# Patient Record
Sex: FEMALE | Race: WHITE | NOT HISPANIC OR LATINO | Employment: OTHER | URBAN - METROPOLITAN AREA
[De-identification: names, ages, dates, MRNs, and addresses within clinical notes are randomized per-mention and may not be internally consistent; named-entity substitution may affect disease eponyms.]

---

## 2017-07-11 ENCOUNTER — OFFICE VISIT (OUTPATIENT)
Dept: DERMATOLOGY | Facility: CLINIC | Age: 67
End: 2017-07-11
Payer: COMMERCIAL

## 2017-07-11 VITALS — HEART RATE: 73 BPM | SYSTOLIC BLOOD PRESSURE: 147 MMHG | DIASTOLIC BLOOD PRESSURE: 79 MMHG | OXYGEN SATURATION: 94 %

## 2017-07-11 DIAGNOSIS — L81.4 LENTIGO: ICD-10-CM

## 2017-07-11 DIAGNOSIS — L82.0 INFLAMED SEBORRHEIC KERATOSIS: ICD-10-CM

## 2017-07-11 DIAGNOSIS — L82.1 SK (SEBORRHEIC KERATOSIS): Primary | ICD-10-CM

## 2017-07-11 PROCEDURE — 17110 DESTRUCTION B9 LES UP TO 14: CPT | Performed by: DERMATOLOGY

## 2017-07-11 PROCEDURE — 99203 OFFICE O/P NEW LOW 30 MIN: CPT | Mod: 25 | Performed by: DERMATOLOGY

## 2017-07-11 NOTE — MR AVS SNAPSHOT
After Visit Summary   7/11/2017    Shanelle Sepulveda    MRN: [id number]           Patient Information     Date Of Birth          1950        Visit Information        Provider Department      7/11/2017 11:00 AM Clyde Gill MD [facility]        Care Instructions    WOUND CARE INSTRUCTIONS   FOR CRYOSURGERY   This area treated with liquid nitrogen will form a blister. You do not need to bandage the area until after the blister forms and breaks (which may be a few days). When the blister breaks, begin daily dressing changes as follows:   1) Clean and dry the area with tap water using clean Q-tip or sterile gauze pad.   2) Apply Polysporin ointment or Bacitracin ointment over entire wound. Do NOT use Neosporin ointment.   3) Cover the wound with a band-aid or sterile non-stick gauze pad and micropore paper tape.   REPEAT THESE INSTRUCTIONS AT LEAST ONCE A DAY UNTIL THE WOUND HAS COMPLETELY HEALED.   It is an old wives tale that a wound heals better when it is exposed to air and allowed to dry out. The wound will heal faster with a better cosmetic result if it is kept moist with ointment and covered with a bandage.   Do not let the wound dry out.   IMPORTANT INFORMATION ON REVERSE SIDE   Supplies Needed:   *Cotton tipped applicators (Q-tips)   *Polysporin ointment or Bacitracin ointment (NOT NEOSPORIN)   *Band-aids, or non stick gauze pads and micropore paper tape   PATIENT INFORMATION   During the healing process you will notice a number of changes. All wounds develop a small halo of redness surrounding the wound. This means healing is occurring. Severe itching with extensive redness usually indicates sensitivity to the ointment or bandage tape used to dress the wound. You should call our office if this develops.   Swelling and/or discoloration around your surgical site is common, particularly when performed around the eye.   All wounds normally drain. The larger the wound  the more drainage there will be. After 7-10 days, you will notice the wound beginning to shrink and new skin will begin to grow. The wound is healed when you can see skin has formed over the entire area. A healed wound has a healthy, shiny look to the surface and is red to dark pink in color to normalize. Wounds may take approximately 4-6 weeks to heal. Larger wounds may take 6-8 weeks. After the wound is healed you may discontinue dressing changes.   You may experience a sensation of tightness as your wound heals. This is normal and will gradually subside.   Your healed wound may be sensitive to temperature changes. This sensitivity improves with time, but if you re having a lot of discomfort, try to avoid temperature extremes.   Patients frequently experience itching after their wound appears to have healed because of the continue healing under the skin. Plain Vaseline will help relieve the itching.                 Follow-ups after your visit        Your next 10 appointments already scheduled     Dec 28, 2017  9:00 AM CST   Nurse Only with [place] [facility] CMA/LPN   [facility]    [address] [address] [phone]              Who to contact     If you have questions or need follow up information about today's clinic visit or your schedule please contact [facility] directly at [phone].  Normal or non-critical lab and imaging results will be communicated to you by MyChart, letter or phone within 4 business days after the clinic has received the results. If you do not hear from us within 7 days, please contact the clinic through MyChart or phone. If you have a critical or abnormal lab result, we will notify you by phone as soon as possible.  Submit refill requests through [employer] or call your pharmacy and they will forward the refill request to us. Please allow 3 business days for your refill to be completed.          Additional  Information About Your Visit        MyChart Information     Mobile [employer] gives you secure access to your electronic health record. If you see a primary care provider, you can also send messages to your care team and make appointments. If you have questions, please call your primary care clinic.  If you do not have a primary care provider, please call [phone] and they will assist you.        Care EveryWhere ID     This is your Care EveryWhere ID. This could be used by other organizations to access your [place] medical records  [id number]        Your Vitals Were     Pulse Last Period Pulse Oximetry             73 10/24/2005 94%          Blood Pressure from Last 3 Encounters:   07/11/17 147/79   12/20/16 122/69   06/16/16 142/90    Weight from Last 3 Encounters:   12/20/16 83.9 kg (185 lb)   06/16/16 81.6 kg (180 lb)   01/08/16 84.4 kg (186 lb)              Today, you had the following     No orders found for display       Primary Care Provider Office Phone # Fax #    Ruth Jessica Angelica, APRN [facility] [phone]       [facility] [address] [address]        Equal Access to Services     NOHEMI CRAWFORD : Hadii aad ku hadasho Soomaali, waaxda luqadaha, qaybta kaalmada adeegyada, waxay mkin haycherellen agustin heath lalucila ah. So [facility] [phone].    ATENCIÓN: Si habla español, tiene a cosme disposición servicios gratuitos de asistencia lingüística. Corrine al [phone].    We comply with applicable federal civil rights laws and [place] laws. We do not discriminate on the basis of race, color, national origin, age, disability sex, sexual orientation or gender identity.            Thank you!     Thank you for choosing [facility]  for your care. Our goal is always to provide you with excellent care. Hearing back from our patients is one way we can continue to improve our services. Please take a few minutes to complete the written survey that you may receive in the mail after your  visit with us. Thank you!             Your Updated Medication List - Protect others around you: Learn how to safely use, store and throw away your medicines at www.disposemymeds.org.          This list is accurate as of: 7/11/17 11:55 AM.  Always use your most recent med list.                   Brand Name Dispense Instructions for use Diagnosis    fish oil-omega-3 fatty acids 1000 MG capsule     120 capsule    Take 1 capsule (1 g) by mouth 3 times daily        metoprolol 100 MG 24 hr tablet    TOPROL-XL    90 tablet    Take 1 tablet (100 mg) by mouth daily    Essential hypertension, benign       MULTIVITAL PO      Take  by mouth.        pravastatin 20 MG tablet    PRAVACHOL    90 tablet    Take 1 tablet (20 mg) by mouth daily    Hyperlipidemia LDL goal <130       venlafaxine 150 MG 24 hr capsule    EFFEXOR-XR    90 capsule    Take 1 capsule (150 mg) by mouth daily    Major depression in complete remission (H)

## 2017-07-11 NOTE — NURSING NOTE
"Chief Complaint   Patient presents with     Derm Problem     check face       Initial /79  Pulse 73  LMP 10/24/2005  SpO2 94% Estimated body mass index is 31.26 kg/(m^2) as calculated from the following:    Height as of 12/20/16: 1.638 m (5' 4.5\").    Weight as of 12/20/16: 83.9 kg (185 lb).  BP completed using cuff size: talia Ac LPN    "

## 2017-07-11 NOTE — PROGRESS NOTES
Shanelle Sepulveda is a 66 year old year old female patient here today for spot on right brow.   .  Patient states this has been present for years.  Patient reports the following symptoms:  Crusting .  Patient reports the following previous treatments none.  Patient reports the following modifying factors none.  Associated symptoms: none.  Patient has no other skin complaints today.  Remainder of the HPI, Meds, PMH, Allergies, FH, and SH was reviewed in chart.      Past Medical History:   Diagnosis Date     Intramural leiomyoma of uterus      MENOPAUSAL DISORDER NOS 8/10/2005    Menses getting much heavier, pelvic sono 02/06--2 uterine fibroids seen, 1.5 and 2.7cm each; simple left ovarian cyst 4.2 cm, 2.6 cm complex right ovarian cyst--endometrial biopsy benign     Symptomatic menopausal or female climacteric states        Past Surgical History:   Procedure Laterality Date     COLONOSCOPY  02/17/2004    wnl     COLONOSCOPY N/A 6/16/2016    Procedure: COLONOSCOPY;  Surgeon: Lloyd Gutierrez MD;  Location: [facility]     HYSTERECTOMY, VAGINAL  4/18/06    Laparoscopic supracerv hyst-BSO        Family History   Problem Relation Age of Onset     Hypertension Mother      CANCER Mother      pancreatic CA     Lipids Mother      CANCER Father      liver CA     Depression Father      Depression Sister      Arthritis Sister      left knee replacement     Breast Cancer Sister      Breast Cancer Sister        Social History     Social History     Marital status:      Spouse name: N/A     Number of children: N/A     Years of education: N/A     Occupational History     Not on file.     Social History Main Topics     Smoking status: Never Smoker     Smokeless tobacco: Never Used     Alcohol use Yes      Comment: rare     Drug use: No     Sexual activity: Yes     Partners: Male     Birth control/ protection: Surgical      Comment: hyster.     Other Topics Concern     Parent/Sibling W/ Cabg, Mi Or Angioplasty Before 65f 55m? No      Social History Narrative       Outpatient Encounter Prescriptions as of 7/11/2017   Medication Sig Dispense Refill     metoprolol (TOPROL-XL) 100 MG 24 hr tablet Take 1 tablet (100 mg) by mouth daily 90 tablet 3     venlafaxine (EFFEXOR-XR) 150 MG 24 hr capsule Take 1 capsule (150 mg) by mouth daily 90 capsule 3     pravastatin (PRAVACHOL) 20 MG tablet Take 1 tablet (20 mg) by mouth daily 90 tablet 2     fish oil-omega-3 fatty acids (FISH OIL) 1000 MG capsule Take 1 capsule (1 g) by mouth 3 times daily 120 capsule 11     Multiple Vitamins-Minerals (MULTIVITAL PO) Take  by mouth.       No facility-administered encounter medications on file as of 7/11/2017.              Review Of Systems  Skin: As above  Eyes: negative  Ears/Nose/Throat: negative  Respiratory: No shortness of breath, dyspnea on exertion, cough, or hemoptysis  Cardiovascular: negative  Gastrointestinal: negative  Genitourinary: negative  Musculoskeletal: negative  Neurologic: negative  Psychiatric: negative  Hematologic/Lymphatic/Immunologic: negative  Endocrine: negative      O:   NAD, WDWN, Alert & Oriented, Mood & Affect wnl, Vitals stable   Here today alone   /79  Pulse 73  LMP 10/24/2005  SpO2 94%   General appearance normal   Vitals stable   Alert, oriented and in no acute distress      Following lymph nodes palpated: Occipital, Cervical, Supraclavicular no lad   Stuck on papules and brown macules on trunk and ext    r brow inflamed seborrheic keratosis         The remainder of expanded problem focused exam was unremarkable; the following areas were examined:  scalp/hair, conjunctiva/lids, face, neck, lips, chest, digits/nails, RUE, LUE.      Eyes: Conjunctivae/lids:Normal     ENT: Lips, buccal mucosa, tongue: normal    MSK:Normal    Cardiovascular: peripheral edema none    Pulm: Breathing Normal    Lymph Nodes: No Head and Neck Lymphadenopathy     Neuro/Psych: Orientation:Normal; Mood/Affect:Normal      A/P:  1. Seborrheic  keratosis, lentigo  2. R brow inflamed seborrheic keratosis   LN2:  Treated with LN2 for 5s for 1-2 cycles. Warned risks of blistering, pain, pigment change, scarring, and incomplete resolution.  Advised patient to return if lesions do not completely resolve.  Wound care sheet given.  BENIGN LESIONS DISCUSSED WITH PATIENT:  I discussed the specifics of tumor, prognosis, and genetics of benign lesions.  I explained that treatment of these lesions would be purely cosmetic and not medically neccessary.  I discussed with patient different removal options including excision, cautery and /or laser.      Nature and genetics of benign skin lesions dicussed with patient.  Signs and Symptoms of skin cancer discussed with patient.  ABCDEs of melanoma reviewed with patient.  Patient encouraged to perform monthly skin exams.  UV precautions reviewed with patient.  Skin care regimen reviewed with patient: Eliminate harsh soaps, i.e. Dial, zest, irsih spring; Mild soaps such as Cetaphil or Dove sensitive skin, avoid hot or cold showers, aggressive use of emollients including vanicream, cetaphil or cerave discussed with patient.    Risks of non-melanoma skin cancer discussed with patient   Return to clinic 12 months

## 2017-07-18 ENCOUNTER — MYC MEDICAL ADVICE (OUTPATIENT)
Dept: FAMILY MEDICINE | Facility: CLINIC | Age: 67
End: 2017-07-18

## 2017-07-18 DIAGNOSIS — E78.5 HYPERLIPIDEMIA LDL GOAL <130: ICD-10-CM

## 2017-07-18 RX ORDER — PRAVASTATIN SODIUM 20 MG
20 TABLET ORAL DAILY
Qty: 90 TABLET | Status: CANCELLED | OUTPATIENT
Start: 2017-07-18

## 2017-07-18 NOTE — TELEPHONE ENCOUNTER
Pravastatin     Last Written Prescription Date: 9/8/16  Last Fill Quantity: 90, # refills: 2  Last Office Visit with [facility], [facility] or [facility] prescribing provider: 12/20/16       Lab Results   Component Value Date    CHOL 205 06/08/2016     Lab Results   Component Value Date    HDL 54 06/08/2016     Lab Results   Component Value Date     06/08/2016     Lab Results   Component Value Date    TRIG 164 06/08/2016     Lab Results   Component Value Date    CHOLHDLRATIO 2.2 04/06/2015     Advised she is due for labs, or we can do 30 at a local pharm only.   Will postpone to be sure she comes in for labs   Nette Davis RNC

## 2017-07-25 NOTE — TELEPHONE ENCOUNTER
Patient has scheduled 8/1/17 lab only visit.   Sent her another message to see if she will need pills prior to this appt.   Nette LEON

## 2017-07-27 RX ORDER — PRAVASTATIN SODIUM 20 MG
20 TABLET ORAL DAILY
Qty: 90 TABLET | Refills: 3 | Status: SHIPPED | OUTPATIENT
Start: 2017-07-27 | End: 2018-01-02

## 2017-08-01 DIAGNOSIS — I10 ESSENTIAL HYPERTENSION, BENIGN: ICD-10-CM

## 2017-08-01 DIAGNOSIS — E78.5 HYPERLIPIDEMIA LDL GOAL <130: ICD-10-CM

## 2017-08-01 LAB
ANION GAP SERPL CALCULATED.3IONS-SCNC: 4 MMOL/L (ref 3–14)
BUN SERPL-MCNC: 26 MG/DL (ref 7–30)
CALCIUM SERPL-MCNC: 9.5 MG/DL (ref 8.5–10.1)
CHLORIDE SERPL-SCNC: 106 MMOL/L (ref 94–109)
CHOLEST SERPL-MCNC: 208 MG/DL
CO2 SERPL-SCNC: 32 MMOL/L (ref 20–32)
CREAT SERPL-MCNC: 0.85 MG/DL (ref 0.52–1.04)
GFR SERPL CREATININE-BSD FRML MDRD: 67 ML/MIN/1.7M2
GLUCOSE SERPL-MCNC: 103 MG/DL (ref 70–99)
HDLC SERPL-MCNC: 65 MG/DL
LDLC SERPL CALC-MCNC: 115 MG/DL
NONHDLC SERPL-MCNC: 143 MG/DL
POTASSIUM SERPL-SCNC: 4.4 MMOL/L (ref 3.4–5.3)
SODIUM SERPL-SCNC: 142 MMOL/L (ref 133–144)
TRIGL SERPL-MCNC: 139 MG/DL

## 2017-08-01 PROCEDURE — 80048 BASIC METABOLIC PNL TOTAL CA: CPT | Performed by: NURSE PRACTITIONER

## 2017-08-01 PROCEDURE — 80061 LIPID PANEL: CPT | Performed by: NURSE PRACTITIONER

## 2017-08-01 PROCEDURE — 36415 COLL VENOUS BLD VENIPUNCTURE: CPT | Performed by: NURSE PRACTITIONER

## 2017-09-11 ENCOUNTER — ALLIED HEALTH/NURSE VISIT (OUTPATIENT)
Dept: FAMILY MEDICINE | Facility: CLINIC | Age: 67
End: 2017-09-11
Payer: COMMERCIAL

## 2017-09-11 DIAGNOSIS — Z23 NEED FOR PROPHYLACTIC VACCINATION AND INOCULATION AGAINST INFLUENZA: Primary | ICD-10-CM

## 2017-09-11 PROCEDURE — 90662 IIV NO PRSV INCREASED AG IM: CPT

## 2017-09-11 PROCEDURE — 90471 IMMUNIZATION ADMIN: CPT

## 2017-09-11 PROCEDURE — 99207 ZZC NO CHARGE NURSE ONLY: CPT

## 2017-09-11 NOTE — PROGRESS NOTES
Injectable Influenza Immunization Documentation    1.  Are you sick today? (Fever of 100.5 or higher on the day of the clinic)   No    2.  Have you ever had Guillain-Concepcion Syndrome within 6 weeks of an influenza vaccionation?  No    3. Do you have a life-threatening allergy to eggs?  No    4. Do you have a life-threatening allergy to a component of the vaccine? May include antibiotics, gelatin or latex.  No     5. Have you ever had a reaction to a dose of flu vaccine that needed immediate medical attention?  No   Answered by patient   Form completed by Mckenzie NI CMA ([facility])

## 2017-09-11 NOTE — MR AVS SNAPSHOT
After Visit Summary   9/11/2017    Shanelle Sepulveda    MRN: [id number]           Patient Information     Date Of Birth          1950        Visit Information        Provider Department      9/11/2017 9:35 AM [facility]        Today's Diagnoses     Need for prophylactic vaccination and inoculation against influenza    -  1       Follow-ups after your visit        Your next 10 appointments already scheduled     Dec 28, 2017  9:00 AM CST   Nurse Only with [facility]IM CMA/LPN   [facility]    [address] [address] [phone]              Who to contact     If you have questions or need follow up information about today's clinic visit or your schedule please contact [facility] directly at [phone].  Normal or non-critical lab and imaging results will be communicated to you by Packbackhart, letter or phone within 4 business days after the clinic has received the results. If you do not hear from us within 7 days, please contact the clinic through Packbackhart or phone. If you have a critical or abnormal lab result, we will notify you by phone as soon as possible.  Submit refill requests through [employer] or call your pharmacy and they will forward the refill request to us. Please allow 3 business days for your refill to be completed.          Additional Information About Your Visit        MyChart Information     [employer] gives you secure access to your electronic health record. If you see a primary care provider, you can also send messages to your care team and make appointments. If you have questions, please call your primary care clinic.  If you do not have a primary care provider, please call [phone] and they will assist you.        Care EveryWhere ID     This is your Care EveryWhere ID. This could be used by other organizations to access your [place] medical records  [id number]         Your Vitals Were     Last Period                   10/24/2005            Blood Pressure from Last 3 Encounters:   07/11/17 147/79   12/20/16 122/69   06/16/16 142/90    Weight from Last 3 Encounters:   12/20/16 185 lb (83.9 kg)   06/16/16 180 lb (81.6 kg)   01/08/16 186 lb (84.4 kg)              We Performed the Following     FLU VACCINE, INCREASED ANTIGEN, PRESV FREE, AGE 65+ [83282]     Vaccine Administration, Initial [53890]        Primary Care Provider Office Phone # Fax #    RICK Swartz [phone] [address] [address]        Equal Access to Services     NOHEMI CRAWFORD : Hadii fahad pazo Soleanna, waaxda luqadaha, qaybta kaalmada adeegyada, jaime alfonso . So [facility] [phone].    ATENCIÓN: Si habla español, tiene a cosme disposición servicios gratuitos de asistencia lingüística. Llame al [phone].    We comply with applicable federal civil rights laws and [place] laws. We do not discriminate on the basis of race, color, national origin, age, disability sex, sexual orientation or gender identity.            Thank you!     Thank you for choosing [facility]  for your care. Our goal is always to provide you with excellent care. Hearing back from our patients is one way we can continue to improve our services. Please take a few minutes to complete the written survey that you may receive in the mail after your visit with us. Thank you!             Your Updated Medication List - Protect others around you: Learn how to safely use, store and throw away your medicines at www.disposemymeds.org.          This list is accurate as of: 9/11/17  9:40 AM.  Always use your most recent med list.                   Brand Name Dispense Instructions for use Diagnosis    fish oil-omega-3 fatty acids 1000 MG capsule     120 capsule    Take 1 capsule (1 g) by mouth 3 times daily        metoprolol 100 MG 24 hr tablet    TOPROL-XL    90  tablet    Take 1 tablet (100 mg) by mouth daily    Essential hypertension, benign       MULTIVITAL PO      Take  by mouth.        pravastatin 20 MG tablet    PRAVACHOL    90 tablet    Take 1 tablet (20 mg) by mouth daily    Hyperlipidemia LDL goal <130       venlafaxine 150 MG 24 hr capsule    EFFEXOR-XR    90 capsule    Take 1 capsule (150 mg) by mouth daily    Major depression in complete remission (H)

## 2017-11-13 ENCOUNTER — MYC MEDICAL ADVICE (OUTPATIENT)
Dept: FAMILY MEDICINE | Facility: CLINIC | Age: 67
End: 2017-11-13

## 2017-11-13 DIAGNOSIS — F32.5 MAJOR DEPRESSION IN COMPLETE REMISSION (H): ICD-10-CM

## 2017-11-14 RX ORDER — VENLAFAXINE HYDROCHLORIDE 150 MG/1
CAPSULE, EXTENDED RELEASE ORAL
Qty: 90 CAPSULE | Refills: 0 | Status: SHIPPED | OUTPATIENT
Start: 2017-11-14 | End: 2018-01-02

## 2017-11-14 NOTE — TELEPHONE ENCOUNTER
venlafaxine (EFFEXOR-XR) 150 MG 24 hr capsule [Pharmacy Med Name: VENLAFAXINE HCL  MG Capsule Extended Release 24 Hour]   11/13/2017  8:53 PM        Blood pressure under 140/90        PHQ-9 score of less than 5 in past 6 months        Recent (6 mo) or future visit with authorizing provider's specialty        Patient is age 18 or older        No active pregnancy on record        Normal serum creatinine on file in past 12 months        No positive pregnancy test in past 12 months   Venlafaxine/effexor    Last Written Prescription Date: 12/20/16  Last Fill Quantity: 90, # refills: 3  Last Office Visit with [facility] or [facility] prescribing provider: 12/20/16   Next 5 appointments (look out 90 days)     Dec 20, 2017 10:30 AM CST   Screening Mammogram with [address] [facility] ([facility])    [address] [address]   [phone]            Jan 02, 2018  9:00 AM CST   PHYSICAL with Cristopher Valladares MD   [facility]    [address] [address]   [phone]                   BP Readings from Last 3 Encounters:   07/11/17 147/79   12/20/16 122/69   06/16/16 142/90     Pulse: (for Fetzima)  Creatinine   Date Value Ref Range Status   08/01/2017 0.85 0.52 - 1.04 mg/dL Final   ]    Last PHQ-9 score on record=   PHQ-9 SCORE 12/20/2016   Total Score -   Total Score 2     Routing refill request to provider for review/approval because:  BP above goal, and PHQ9 not current. (I will send her one to complete on VoÃ¶lks now)   She has an appt in January.   RN protocol would allow for one month supply, and in [place] note,  Dr Valladares -- I will be running out of my Venlafaxine 150 before our January 2 appointment.  Please OK a 90-day refill through [facility] mail order.  Sincerely,  Mckenzie Sepulveda   patient is requesting 90 days as it is Mail order, [facility].   Nette Davis RNC

## 2017-12-20 ENCOUNTER — HOSPITAL ENCOUNTER (OUTPATIENT)
Dept: MAMMOGRAPHY | Facility: CLINIC | Age: 67
Discharge: HOME | End: 2017-12-20
Attending: NURSE PRACTITIONER | Admitting: NURSE PRACTITIONER
Payer: MEDICARE

## 2017-12-20 DIAGNOSIS — Z12.31 VISIT FOR SCREENING MAMMOGRAM: ICD-10-CM

## 2017-12-20 PROCEDURE — G0202 SCR MAMMO BI INCL CAD: HCPCS

## 2017-12-20 PROCEDURE — 77063 BREAST TOMOSYNTHESIS BI: CPT

## 2017-12-30 ASSESSMENT — ACTIVITIES OF DAILY LIVING (ADL)
CURRENT_FUNCTION: NO ASSISTANCE NEEDED
I_NEED_ASSISTANCE_FOR_THE_FOLLOWING_DAILY_ACTIVITIES:: NO ASSISTANCE IS NEEDED

## 2018-01-02 ENCOUNTER — OFFICE VISIT (OUTPATIENT)
Dept: FAMILY MEDICINE | Facility: CLINIC | Age: 68
End: 2018-01-02
Payer: COMMERCIAL

## 2018-01-02 VITALS
BODY MASS INDEX: 31.39 KG/M2 | DIASTOLIC BLOOD PRESSURE: 103 MMHG | TEMPERATURE: 98.7 F | SYSTOLIC BLOOD PRESSURE: 171 MMHG | HEIGHT: 65 IN | WEIGHT: 188.4 LBS | HEART RATE: 73 BPM

## 2018-01-02 DIAGNOSIS — R21 GROIN RASH: ICD-10-CM

## 2018-01-02 DIAGNOSIS — Z12.4 SCREENING FOR MALIGNANT NEOPLASM OF CERVIX: ICD-10-CM

## 2018-01-02 DIAGNOSIS — I10 ESSENTIAL HYPERTENSION, BENIGN: ICD-10-CM

## 2018-01-02 DIAGNOSIS — Z11.59 NEED FOR HEPATITIS C SCREENING TEST: ICD-10-CM

## 2018-01-02 DIAGNOSIS — Z23 NEED FOR VACCINATION: ICD-10-CM

## 2018-01-02 DIAGNOSIS — Z78.0 POST-MENOPAUSE: ICD-10-CM

## 2018-01-02 DIAGNOSIS — F32.5 MAJOR DEPRESSION IN COMPLETE REMISSION (H): ICD-10-CM

## 2018-01-02 DIAGNOSIS — E78.5 HYPERLIPIDEMIA LDL GOAL <130: ICD-10-CM

## 2018-01-02 DIAGNOSIS — R73.01 ELEVATED FASTING GLUCOSE: ICD-10-CM

## 2018-01-02 DIAGNOSIS — Z00.00 ROUTINE HEALTH MAINTENANCE: Primary | ICD-10-CM

## 2018-01-02 LAB
HBA1C MFR BLD: 5.8 % (ref 4.3–6)
HCV AB SERPL QL IA: NONREACTIVE

## 2018-01-02 PROCEDURE — 99397 PER PM REEVAL EST PAT 65+ YR: CPT | Mod: 25 | Performed by: INTERNAL MEDICINE

## 2018-01-02 PROCEDURE — G0008 ADMIN INFLUENZA VIRUS VAC: HCPCS | Performed by: INTERNAL MEDICINE

## 2018-01-02 PROCEDURE — 83036 HEMOGLOBIN GLYCOSYLATED A1C: CPT | Performed by: INTERNAL MEDICINE

## 2018-01-02 PROCEDURE — G0476 HPV COMBO ASSAY CA SCREEN: HCPCS | Performed by: INTERNAL MEDICINE

## 2018-01-02 PROCEDURE — G0009 ADMIN PNEUMOCOCCAL VACCINE: HCPCS | Performed by: INTERNAL MEDICINE

## 2018-01-02 PROCEDURE — 36415 COLL VENOUS BLD VENIPUNCTURE: CPT | Performed by: INTERNAL MEDICINE

## 2018-01-02 PROCEDURE — 86803 HEPATITIS C AB TEST: CPT | Performed by: INTERNAL MEDICINE

## 2018-01-02 PROCEDURE — G0145 SCR C/V CYTO,THINLAYER,RESCR: HCPCS | Performed by: INTERNAL MEDICINE

## 2018-01-02 PROCEDURE — 90715 TDAP VACCINE 7 YRS/> IM: CPT | Performed by: INTERNAL MEDICINE

## 2018-01-02 PROCEDURE — 90670 PCV13 VACCINE IM: CPT | Performed by: INTERNAL MEDICINE

## 2018-01-02 PROCEDURE — 99213 OFFICE O/P EST LOW 20 MIN: CPT | Mod: 25 | Performed by: INTERNAL MEDICINE

## 2018-01-02 RX ORDER — PRAVASTATIN SODIUM 20 MG
20 TABLET ORAL DAILY
Qty: 90 TABLET | Refills: 3 | Status: SHIPPED | OUTPATIENT
Start: 2018-01-02 | End: 2018-06-28

## 2018-01-02 RX ORDER — VENLAFAXINE HYDROCHLORIDE 150 MG/1
CAPSULE, EXTENDED RELEASE ORAL
Qty: 90 CAPSULE | Refills: 3 | Status: SHIPPED | OUTPATIENT
Start: 2018-01-02 | End: 2018-04-02

## 2018-01-02 RX ORDER — NYSTATIN AND TRIAMCINOLONE ACETONIDE 100000; 1 [USP'U]/G; MG/G
CREAM TOPICAL 2 TIMES DAILY
Qty: 30 G | Refills: 1 | Status: SHIPPED | OUTPATIENT
Start: 2018-01-02 | End: 2018-01-12

## 2018-01-02 RX ORDER — METOPROLOL SUCCINATE 100 MG/1
100 TABLET, EXTENDED RELEASE ORAL DAILY
Qty: 90 TABLET | Refills: 3 | Status: SHIPPED | OUTPATIENT
Start: 2018-01-02 | End: 2018-04-02

## 2018-01-02 ASSESSMENT — ANXIETY QUESTIONNAIRES
2. NOT BEING ABLE TO STOP OR CONTROL WORRYING: NOT AT ALL
GAD7 TOTAL SCORE: 0
5. BEING SO RESTLESS THAT IT IS HARD TO SIT STILL: NOT AT ALL
7. FEELING AFRAID AS IF SOMETHING AWFUL MIGHT HAPPEN: NOT AT ALL
3. WORRYING TOO MUCH ABOUT DIFFERENT THINGS: NOT AT ALL
1. FEELING NERVOUS, ANXIOUS, OR ON EDGE: NOT AT ALL
6. BECOMING EASILY ANNOYED OR IRRITABLE: NOT AT ALL

## 2018-01-02 ASSESSMENT — ACTIVITIES OF DAILY LIVING (ADL): CURRENT_FUNCTION: NO ASSISTANCE NEEDED

## 2018-01-02 ASSESSMENT — PATIENT HEALTH QUESTIONNAIRE - PHQ9
SUM OF ALL RESPONSES TO PHQ QUESTIONS 1-9: 1
5. POOR APPETITE OR OVEREATING: NOT AT ALL

## 2018-01-02 NOTE — NURSING NOTE
"Chief Complaint   Patient presents with     Medicare Visit       Initial BP (!) 171/103 (BP Location: Right arm, Patient Position: Chair, Cuff Size: Adult Regular)  Pulse 73  Temp 98.7  F (37.1  C) (Tympanic)  Ht 5' 4.5\" (1.638 m)  Wt 188 lb 6.4 oz (85.5 kg)  LMP 10/24/2005  BMI 31.84 kg/m2 Estimated body mass index is 31.84 kg/(m^2) as calculated from the following:    Height as of this encounter: 5' 4.5\" (1.638 m).    Weight as of this encounter: 188 lb 6.4 oz (85.5 kg).  Medication Reconciliation: complete     Marium Duarte, PATRIZIA      "

## 2018-01-02 NOTE — PROGRESS NOTES
SUBJECTIVE:   CC: Shanelle Sepulveda is an 67 year old woman who presents for preventive health visit.     Physical   Annual:     Getting at least 3 servings of Calcium per day::  Yes    Bi-annual eye exam::  Yes    Dental care twice a year::  Yes    Sleep apnea or symptoms of sleep apnea::  Daytime drowsiness    Diet::  Regular (no restrictions)    Frequency of exercise::  1 day/week    Duration of exercise::  Less than 15 minutes    Taking medications regularly::  Yes    Medication side effects::  No muscle aches and No significant flushing    Additional concerns today::  YES    Ability to successfully perform activities of daily living: no assistance needed  Home Safety:  Lack of grab bars in the bathroom  Hearing Impairment: no hearing concerns    {Outside tests to abstract? :934824}    {additional problems to add (Optional):438745}    Today's PHQ-2 Score:   PHQ-2 ( 1999 [employer]) 12/30/2017   Q1: Little interest or pleasure in doing things 0   Q2: Feeling down, depressed or hopeless 0   PHQ-2 Score 0   Q1: Little interest or pleasure in doing things Not at all   Q2: Feeling down, depressed or hopeless Not at all   PHQ-2 Score 0       Abuse: Current or Past(Physical, Sexual or Emotional)- {YES/NO/NA:909006}  Do you feel safe in your environment - {YES/NO/NA:660390}    Social History   Substance Use Topics     Smoking status: Never Smoker     Smokeless tobacco: Never Used     Alcohol use Yes      Comment: rare     Alcohol Use 12/30/2017   If you drink alcohol, do you typically have greater than 3 drinks per day OR greater than 7 drinks per week?   No   {add AUDIT responses (Optional) (A score of 7 for adult men is an indication of hazardous drinking; a score of 8 or more is an indication of an alcohol use disorder.  A score of 7 or more for adult women is an indication of hazardous drinking or an alchohol use disorder):437023}    Reviewed orders with patient.  Reviewed health maintenance and updated orders  "accordingly - {Yes/No:740812::\"Yes\"}  {Chronicprobdata (Optional):033275}    {Mammo Decision Support (Optional):131082}    Pertinent mammograms are reviewed under the imaging tab.  History of abnormal Pap smear: {PAP HX:482924}    Reviewed and updated as needed this visit by clinical staff         Reviewed and updated as needed this visit by Provider        {HISTORY OPTIONS (Optional):692934}    Review of Systems  {FEMALE PREVENTATIVE ROS:411320}     OBJECTIVE:   LMP 10/24/2005  Physical Exam  {Exam Choices:348126}    ASSESSMENT/PLAN:   {Diag Picklist:642944}    COUNSELING:  {FEMALE COUNSELING MESSAGES:430489::\"Reviewed preventive health counseling, as reflected in patient instructions\"}    {BP Counseling- Complete if BP >= 120/80  (Optional):482260}     reports that she has never smoked. She has never used smokeless tobacco.  {Tobacco Cessation -- Complete if patient is a smoker (Optional):569973}  Estimated body mass index is 31.26 kg/(m^2) as calculated from the following:    Height as of 12/20/16: 5' 4.5\" (1.638 m).    Weight as of 12/20/16: 185 lb (83.9 kg).   {Weight Management Plan (ACO) Complete if BMI is abnormal-  Ages 18-64  BMI >24.9.  Age 65+ with BMI <23 or >30 (Optional):119585}    Counseling Resources:  ATP IV Guidelines  Pooled Cohorts Equation Calculator  Breast Cancer Risk Calculator  FRAX Risk Assessment  ICSI Preventive Guidelines  Dietary Guidelines for Americans, 2010  USDA's MyPlate  ASA Prophylaxis  Lung CA Screening    Cristopher Valladares MD  [facility]  Answers for HPI/ROS submitted by the patient on 12/30/2017   PHQ-2 Score: 0    "

## 2018-01-02 NOTE — PATIENT INSTRUCTIONS
Check your blood pressures at home a few times per week and bring these numbers in for a free nurse blood pressure check in 2 weeks.      If the dizzy episodes worsened, please make another appointment for further evaluation.       Preventive Health Recommendations    Female Ages 65 +    Yearly exam:     See your health care provider every year in order to  o Review health changes.   o Discuss preventive care.    o Review your medicines if your doctor has prescribed any.      You no longer need a yearly Pap test unless you've had an abnormal Pap test in the past 10 years. If you have vaginal symptoms, such as bleeding or discharge, be sure to talk with your provider about a Pap test.      Every 1 to 2 years, have a mammogram.  If you are over 69, talk with your health care provider about whether or not you want to continue having screening mammograms.      Every 10 years, have a colonoscopy. Or, have a yearly FIT test (stool test). These exams will check for colon cancer.       Have a cholesterol test every 5 years, or more often if your doctor advises it.       Have a diabetes test (fasting glucose) every three years. If you are at risk for diabetes, you should have this test more often.       At age 65, have a bone density scan (DEXA) to check for osteoporosis (brittle bone disease).    Shots:    Get a flu shot each year.    Get a tetanus shot every 10 years.    Talk to your doctor about your pneumonia vaccines. There are now two you should receive - Pneumovax (PPSV 23) and Prevnar (PCV 13).    Talk to your doctor about the shingles vaccine.    Talk to your doctor about the hepatitis B vaccine.    Nutrition:     Eat at least 5 servings of fruits and vegetables each day.      Eat whole-grain bread, whole-wheat pasta and brown rice instead of white grains and rice.      Talk to your provider about Calcium and Vitamin D.     Lifestyle    Exercise at least 150 minutes a week (30 minutes a day, 5 days a week). This  will help you control your weight and prevent disease.      Limit alcohol to one drink per day.      No smoking.       Wear sunscreen to prevent skin cancer.       See your dentist twice a year for an exam and cleaning.      See your eye doctor every 1 to 2 years to screen for conditions such as glaucoma, macular degeneration and cataracts.

## 2018-01-02 NOTE — MR AVS SNAPSHOT
After Visit Summary   1/2/2018    Shanelle Sepulveda    MRN: [id number]           Patient Information     Date Of Birth          1950        Visit Information        Provider Department      1/2/2018 9:00 AM Cristopher Valladares MD [facility]        Today's Diagnoses     Need for hepatitis C screening test    -  1    Elevated fasting glucose        Post-menopause        Screening for malignant neoplasm of cervix        Groin rash          Care Instructions    Check your blood pressures at home a few times per week and bring these numbers in for a free nurse blood pressure check in 2 weeks.      If the dizzy episodes worsened, please make another appointment for further evaluation.       Preventive Health Recommendations    Female Ages 65 +    Yearly exam:     See your health care provider every year in order to  o Review health changes.   o Discuss preventive care.    o Review your medicines if your doctor has prescribed any.      You no longer need a yearly Pap test unless you've had an abnormal Pap test in the past 10 years. If you have vaginal symptoms, such as bleeding or discharge, be sure to talk with your provider about a Pap test.      Every 1 to 2 years, have a mammogram.  If you are over 69, talk with your health care provider about whether or not you want to continue having screening mammograms.      Every 10 years, have a colonoscopy. Or, have a yearly FIT test (stool test). These exams will check for colon cancer.       Have a cholesterol test every 5 years, or more often if your doctor advises it.       Have a diabetes test (fasting glucose) every three years. If you are at risk for diabetes, you should have this test more often.       At age 65, have a bone density scan (DEXA) to check for osteoporosis (brittle bone disease).    Shots:    Get a flu shot each year.    Get a tetanus shot every 10 years.    Talk to your doctor about your pneumonia vaccines. There are now two you  should receive - Pneumovax (PPSV 23) and Prevnar (PCV 13).    Talk to your doctor about the shingles vaccine.    Talk to your doctor about the hepatitis B vaccine.    Nutrition:     Eat at least 5 servings of fruits and vegetables each day.      Eat whole-grain bread, whole-wheat pasta and brown rice instead of white grains and rice.      Talk to your provider about Calcium and Vitamin D.     Lifestyle    Exercise at least 150 minutes a week (30 minutes a day, 5 days a week). This will help you control your weight and prevent disease.      Limit alcohol to one drink per day.      No smoking.       Wear sunscreen to prevent skin cancer.       See your dentist twice a year for an exam and cleaning.      See your eye doctor every 1 to 2 years to screen for conditions such as glaucoma, macular degeneration and cataracts.          Follow-ups after your visit        Future tests that were ordered for you today     Open Future Orders        Priority Expected Expires Ordered    DX Hip/Pelvis/Spine Routine  1/2/2019 1/2/2018            Who to contact     If you have questions or need follow up information about today's clinic visit or your schedule please contact [facility] directly at [phone].  Normal or non-critical lab and imaging results will be communicated to you by [place], letter or phone within 4 business days after the clinic has received the results. If you do not hear from us within 7 days, please contact the clinic through [place] or phone. If you have a critical or abnormal lab result, we will notify you by phone as soon as possible.  Submit refill requests through Callvine or call your pharmacy and they will forward the refill request to us. Please allow 3 business days for your refill to be completed.          Additional Information About Your Visit        [email or web address] Information     Callvine gives you secure access to your electronic health record. If you see a primary care provider, you can  "also send messages to your care team and make appointments. If you have questions, please call your primary care clinic.  If you do not have a primary care provider, please call [phone] and they will assist you.        Care EveryWhere ID     This is your Care EveryWhere ID. This could be used by other organizations to access your [place] medical records  [id number]        Your Vitals Were     Pulse Temperature Height Last Period BMI (Body Mass Index)       73 98.7  F (37.1  C) (Tympanic) 5' 4.5\" (1.638 m) 10/24/2005 31.84 kg/m2        Blood Pressure from Last 3 Encounters:   01/02/18 (!) 171/103   07/11/17 147/79   12/20/16 122/69    Weight from Last 3 Encounters:   01/02/18 188 lb 6.4 oz (85.5 kg)   12/20/16 185 lb (83.9 kg)   06/16/16 180 lb (81.6 kg)              We Performed the Following     Hemoglobin A1c     HPV High Risk Types DNA Cervical     Pap imaged thin layer screen with HPV - recommended age 30 - 65 years (select HPV order below)          Today's Medication Changes          These changes are accurate as of: 1/2/18 10:08 AM.  If you have any questions, ask your nurse or doctor.               Start taking these medicines.        Dose/Directions    nystatin-triamcinolone cream   Commonly known as:  MYCOLOG II   Used for:  Groin rash   Started by:  Cristopher Valladares MD        Apply topically 2 times daily for 14 days Can resume after a week long break if needed.   Quantity:  30 g   Refills:  1            Where to get your medicines      These medications were sent to [facility] - [place], [place] - [address]   [address] [facility] [address]     Phone:  [phone]     nystatin-triamcinolone cream                Primary Care Provider Office Phone # Fax #    RICK Swartz [facility] [phone] [address] [facility] [address]        Equal Access to Services     NOHEMI CRAWFORD AH: Ignacio Black, benigno parnell, dawn mead, " jaime pichardo agustin zimmerman'aan ah. So [facility] [phone].    ATENCIÓN: Si phillip duran, tiene a cosme disposición servicios gratuitos de asistencia lingüística. Corrine chan [phone].    We comply with applicable federal civil rights laws and [place] laws. We do not discriminate on the basis of race, color, national origin, age, disability, sex, sexual orientation, or gender identity.            Thank you!     Thank you for choosing [facility]  for your care. Our goal is always to provide you with excellent care. Hearing back from our patients is one way we can continue to improve our services. Please take a few minutes to complete the written survey that you may receive in the mail after your visit with us. Thank you!             Your Updated Medication List - Protect others around you: Learn how to safely use, store and throw away your medicines at www.disposemymeds.org.          This list is accurate as of: 1/2/18 10:08 AM.  Always use your most recent med list.                   Brand Name Dispense Instructions for use Diagnosis    fish oil-omega-3 fatty acids 1000 MG capsule     120 capsule    Take 1 capsule (1 g) by mouth 3 times daily        metoprolol 100 MG 24 hr tablet    TOPROL-XL    90 tablet    Take 1 tablet (100 mg) by mouth daily    Essential hypertension, benign       MULTIVITAL PO      Take  by mouth.        nystatin-triamcinolone cream    MYCOLOG II    30 g    Apply topically 2 times daily for 14 days Can resume after a week long break if needed.    Groin rash       pravastatin 20 MG tablet    PRAVACHOL    90 tablet    Take 1 tablet (20 mg) by mouth daily    Hyperlipidemia LDL goal <130       venlafaxine 150 MG 24 hr capsule    EFFEXOR-XR    90 capsule    TAKE 1 CAPSULE EVERY DAY    Major depression in complete remission (H)

## 2018-01-02 NOTE — PROGRESS NOTES
SUBJECTIVE:   Shanelle Sepulveda is a 67 year old female who presents for Preventive Visit.    Answers for HPI/ROS submitted by the patient on 12/30/2017   Annual Exam:  Getting at least 3 servings of Calcium per day:: Yes  Bi-annual eye exam:: Yes  Dental care twice a year:: Yes  Sleep apnea or symptoms of sleep apnea:: Daytime drowsiness  Diet:: Regular (no restrictions)  Frequency of exercise:: 1 day/week  Taking medications regularly:: Yes  Medication side effects:: No muscle aches, No significant flushing  Additional concerns today:: YES  Activities of Daily Living: no assistance needed  Home safety: lack of grab bars in the bathroom  Hearing Impairment:: no hearing concerns  PHQ-2 Score: 0  Duration of exercise:: Less than 15 minutes    Fall risk: 0    COGNITIVE SCREEN  1) Repeat 3 items (Banana, Sunrise, Chair)    2) Clock draw: NORMAL  3) 3 item recall: Recalls 3 objects  Results: 3 items recalled: COGNITIVE IMPAIRMENT LESS LIKELY    Mini-CogTM Copyright VJ Campbell. Licensed by the author for use in [facility]; reprinted with permission (amy@.[facility]). All rights reserved.          -Cervical Screening Request: Patient is requesting a pap smear. She was told at a previous visit she doesn't need them anymore but she would like one done today.    -Medication Refill Request: Metoprolol and Pravastatin. Pt has no concerns with these medications.     -Lab Screening Request: Pt is would like to know if she should have a baseline a1c completed since this has never been checked in the past.    -Dizziness:Pt notes that she has intermittent dizziness, she doesn't know if this is related to missing a dose of medication at times or what it could be from.  This has been going on for 3 months a couple times per week.  Just lasts a few seconds, no associated palpitations or chest pain.  Occurs while walking.      - Groin rash and itch- a few months, no vaginal discharge.  She used a cream for this years  ago    -Immunization: Pt notes she is due for Pneumonia vaccine.     Reviewed and updated as needed this visit by clinical staff  Tobacco  Allergies  Med Hx  Surg Hx  Fam Hx  Soc Hx        Reviewed and updated as needed this visit by Provider        Social History   Substance Use Topics     Smoking status: Never Smoker     Smokeless tobacco: Never Used     Alcohol use Yes      Comment: rare       If you drink alcohol do you typically have >3 drinks per day or >7 drinks per week? No                        Today's PHQ-2 Score:   PHQ-2 ( 1999 [employer]) 1/2/2018 12/30/2017   Q1: Little interest or pleasure in doing things 0 0   Q2: Feeling down, depressed or hopeless 0 0   PHQ-2 Score 0 0   Q1: Little interest or pleasure in doing things - Not at all   Q2: Feeling down, depressed or hopeless - Not at all   PHQ-2 Score - 0         Do you feel safe in your environment - Yes    Do you have a Health Care Directive?: Yes: Patient states has Advance Directive and will bring in a copy to clinic.    Current providers sharing in care for this patient include: Patient Care Team:  Ruth Dominguez APRN CNP as PCP - General (Nurse Practitioner)    The following health maintenance items are reviewed in Epic and correct as of today:  Health Maintenance   Topic Date Due     HEPATITIS C SCREENING  11/21/1968     DEPRESSION ACTION PLAN Q1 YR  07/23/2015     DEXA SCAN SCREENING (SYSTEM ASSIGNED)  11/21/2015     PHQ-9 Q6 MONTHS  06/20/2017     PNEUMOCOCCAL (2 of 2 - PCV13) 12/20/2017     LOW DOSE ASA FOR PRIMARY PREVENTION  12/20/2017     FALL RISK ASSESSMENT  12/20/2017     TETANUS Q10 YR  05/05/2018     MAMMO Q1 YR  12/20/2018     ADVANCE DIRECTIVE PLANNING Q5 YRS  12/20/2021     LIPID SCREEN Q5 YR FEMALE (SYSTEM ASSIGNED)  08/01/2022     COLONOSCOPY Q10 YR  06/16/2026     INFLUENZA VACCINE (SYSTEM ASSIGNED)  Completed     BP Readings from Last 3 Encounters:   01/02/18 (!) 171/103   07/11/17 147/79   12/20/16 122/69    Wt Readings  from Last 3 Encounters:   01/02/18 188 lb 6.4 oz (85.5 kg)   12/20/16 185 lb (83.9 kg)   06/16/16 180 lb (81.6 kg)                  Patient Active Problem List   Diagnosis     Essential hypertension, benign     Generalized anxiety disorder     Advanced directives, counseling/discussion     Overactive bladder     Atrophic vaginitis     Hyperlipidemia with target LDL less than 130     HTN, goal below 140/90     Mild major depression (H)     Urgency-frequency syndrome     Family history of malignant neoplasm of breast     Past Surgical History:   Procedure Laterality Date     COLONOSCOPY  02/17/2004    wnl     COLONOSCOPY N/A 6/16/2016    Procedure: COLONOSCOPY;  Surgeon: Llody Gutierrez MD;  Location: [facility]     HYSTERECTOMY, VAGINAL  4/18/06    Laparoscopic supracerv hyst-BSO       Social History   Substance Use Topics     Smoking status: Never Smoker     Smokeless tobacco: Never Used     Alcohol use Yes      Comment: rare     Family History   Problem Relation Age of Onset     Hypertension Mother      CANCER Mother      pancreatic CA     Lipids Mother      CANCER Father      liver CA     Depression Father      Depression Sister      Arthritis Sister      left knee replacement     Breast Cancer Sister      Breast Cancer Sister          Current Outpatient Prescriptions   Medication Sig Dispense Refill     nystatin-triamcinolone (MYCOLOG II) cream Apply topically 2 times daily for 14 days Can resume after a week long break if needed. 30 g 1     metoprolol (TOPROL-XL) 100 MG 24 hr tablet Take 1 tablet (100 mg) by mouth daily 90 tablet 3     pravastatin (PRAVACHOL) 20 MG tablet Take 1 tablet (20 mg) by mouth daily 90 tablet 3     venlafaxine (EFFEXOR-XR) 150 MG 24 hr capsule TAKE 1 CAPSULE EVERY DAY 90 capsule 3     fish oil-omega-3 fatty acids (FISH OIL) 1000 MG capsule Take 1 capsule (1 g) by mouth 3 times daily 120 capsule 11     Multiple Vitamins-Minerals (MULTIVITAL PO) Take  by mouth.       [DISCONTINUED] venlafaxine  "(EFFEXOR-XR) 150 MG 24 hr capsule TAKE 1 CAPSULE EVERY DAY 90 capsule 0     [DISCONTINUED] pravastatin (PRAVACHOL) 20 MG tablet Take 1 tablet (20 mg) by mouth daily 90 tablet 3     [DISCONTINUED] metoprolol (TOPROL-XL) 100 MG 24 hr tablet Take 1 tablet (100 mg) by mouth daily 90 tablet 3     Allergies   Allergen Reactions     Atorvastatin Other (See Comments)     Myalgia             Pneumonia Vaccine:Adults age 65+ who received Pneumovax (PPSV23) at 65 years or older: Should be given PCV13 > 1 year after their most recent PPSV23  Mammogram Screening: Patient over age 50, mutual decision to screen reflected in health maintenance.    ROS:    10 point ROS of systems including Constitutional, Eyes, Respiratory, Cardiovascular, Gastroenterology, Genitourinary, Integumentary, Muscularskeletal, Psychiatric were all negative except for pertinent positives noted in my HPI plus occasional trouble with regular BMs and chronic urinary frequency/urgency.      OBJECTIVE:   BP (!) 171/103 (BP Location: Right arm, Patient Position: Chair, Cuff Size: Adult Regular)  Pulse 73  Temp 98.7  F (37.1  C) (Tympanic)  Ht 5' 4.5\" (1.638 m)  Wt 188 lb 6.4 oz (85.5 kg)  LMP 10/24/2005  BMI 31.84 kg/m2 Estimated body mass index is 31.84 kg/(m^2) as calculated from the following:    Height as of this encounter: 5' 4.5\" (1.638 m).    Weight as of this encounter: 188 lb 6.4 oz (85.5 kg).  EXAM:   GENERAL: healthy, alert and no distress  EYES: Eyes grossly normal to inspection, PERRL and conjunctivae and sclerae normal  HENT: ear canals and TM's normal, nose and mouth without ulcers or lesions  NECK: no adenopathy, no asymmetry, masses, or scars and thyroid normal to palpation  RESP: lungs clear to auscultation - no rales, rhonchi or wheezes  BREAST: deferred, mammo scheduled  CV: regular rate and rhythm, normal S1 S2, no S3 or S4, no murmur, click or rub, no peripheral edema   ABDOMEN: soft, nontender, no hepatosplenomegaly, no masses and " bowel sounds normal  MS: no gross musculoskeletal defects noted, no edema  SKIN: erythema in groin folds, no satellite lesions  : normal genitalia, normal vaginal mucosa and cervix, Pap perfromed  NEURO: Normal strength and tone, mentation intact and speech normal  PSYCH: mentation appears normal, affect normal/bright    ASSESSMENT / PLAN:   1. Routine health maintenance  2. Need for hepatitis C screening test  3. Elevated fasting glucose  4. Post-menopause  5. Screening for malignant neoplasm of cervix      Pap smear: she has technically aged out of routine screening but her last Pap was in 2013 without HPV testing, so we did perform one more Pap with HPV today and if normal, we will stop screening     Immunizations: Tetanus and Prevnar given    Lab Studies: Hep C ordered.  A1C done since she has an elevated fasting glucose and this is slightly in the pre-diabetes range.  Lipids up to date    Mammogram: done last month    Colonoscopy: completed 2016    Advanced care planning: she has documents at home and will bring in     DEXA ordered     Recommend starting daily baby aspirin    - **Hepatitis C Screen Reflex to RNA FUTURE anytime  - Hemoglobin A1c  - DX Hip/Pelvis/Spine; Future  - Pap imaged thin layer screen with HPV - recommended age 30 - 65 years (select HPV order below)  - HPV High Risk Types DNA Cervical    6. Groin rash    No satellite lesions seen, but she has responded to Mycolog cream in the past, so provided a prescription for this.      - nystatin-triamcinolone (MYCOLOG II) cream; Apply topically 2 times daily for 14 days Can resume after a week long break if needed.  Dispense: 30 g; Refill: 1    7. Essential hypertension, benign    BP is quite elevated today- recommended monitoring home BPs and returning in 2 weeks for RN BP check.      - metoprolol (TOPROL-XL) 100 MG 24 hr tablet; Take 1 tablet (100 mg) by mouth daily  Dispense: 90 tablet; Refill: 3    8. Hyperlipidemia LDL goal <130    Labs up to  "date, refill provided.     - pravastatin (PRAVACHOL) 20 MG tablet; Take 1 tablet (20 mg) by mouth daily  Dispense: 90 tablet; Refill: 3    9. Major depression in complete remission (H)    Controlled, refill provided    PHQ-9 SCORE 12/8/2015 12/20/2016 1/2/2018   Total Score - - -   Total Score 0 2 1       - venlafaxine (EFFEXOR-XR) 150 MG 24 hr capsule; TAKE 1 CAPSULE EVERY DAY  Dispense: 90 capsule; Refill: 3    10. Need for vaccination    - TDAP VACCINE (ADACEL) [15098.002]  - Pneumococcal vaccine 13 valent PCV13 IM (Prevnar) [39642]  - ADMIN: Vaccine, Initial (99047)  - Each additional admin.  (Right click and add QUANTITY)  [81614]    End of Life Planning:  Patient currently has an advanced directive: Yes.  Practitioner is supportive of decision.    COUNSELING:  Reviewed preventive health counseling, as reflected in patient instructions       Aspirin Prophylaxsis        Estimated body mass index is 31.84 kg/(m^2) as calculated from the following:    Height as of this encounter: 5' 4.5\" (1.638 m).    Weight as of this encounter: 188 lb 6.4 oz (85.5 kg).  Weight management plan: Discussed healthy diet and exercise guidelines and patient will follow up in 12 months in clinic to re-evaluate.  She is going to be starting exercise at the [facility] soon     reports that she has never smoked. She has never used smokeless tobacco.      Appropriate preventive services were discussed with this patient, including applicable screening as appropriate for cardiovascular disease, diabetes, osteopenia/osteoporosis, and glaucoma.  As appropriate for age/gender, discussed screening for colorectal cancer, prostate cancer, breast cancer, and cervical cancer. Checklist reviewing preventive services available has been given to the patient.    Reviewed patients plan of care and provided an AVS. The Basic Care Plan (routine screening as documented in Health Maintenance) for Shanelle meets the Care Plan requirement. This Care Plan has been " established and reviewed with the Patient.      Cristopher Valladares MD  [facility]

## 2018-01-03 ASSESSMENT — ANXIETY QUESTIONNAIRES: GAD7 TOTAL SCORE: 0

## 2018-01-04 LAB
COPATH REPORT: NORMAL
PAP: NORMAL

## 2018-01-08 LAB
FINAL DIAGNOSIS: NORMAL
HPV HR 12 DNA CVX QL NAA+PROBE: NEGATIVE
HPV16 DNA SPEC QL NAA+PROBE: NEGATIVE
HPV18 DNA SPEC QL NAA+PROBE: NEGATIVE
SPECIMEN DESCRIPTION: NORMAL

## 2018-01-09 PROBLEM — Z12.4 CERVICAL CANCER SCREENING: Status: ACTIVE | Noted: 2018-01-09

## 2018-01-12 ENCOUNTER — TELEPHONE (OUTPATIENT)
Dept: FAMILY MEDICINE | Facility: CLINIC | Age: 68
End: 2018-01-12

## 2018-01-12 DIAGNOSIS — R21 RASH AND NONSPECIFIC SKIN ERUPTION: Primary | ICD-10-CM

## 2018-01-12 RX ORDER — NYSTATIN 100000 U/G
CREAM TOPICAL 3 TIMES DAILY
Qty: 30 G | Refills: 1 | Status: SHIPPED | OUTPATIENT
Start: 2018-01-12 | End: 2018-01-26

## 2018-01-12 NOTE — TELEPHONE ENCOUNTER
Natalia or covering provider,    Patient was seen in clinic on 1/2/18.  Prescribed mycolog II cream.  The cream is very expensive and the patient would like something cheaper if possible.  Please advise. Uyen PENA RN

## 2018-01-12 NOTE — TELEPHONE ENCOUNTER
Pharmacy note regarding nystatin-triamcinolone (MYCOLOG II) cream:  Is there an alternative? This one is costly to the patient.

## 2018-01-12 NOTE — TELEPHONE ENCOUNTER
Can try nystatin cream instead.  Rx sent to pharmacy.  She should let us know if not improved in 2 weeks.  Thanks.    Cristopher Valladares MD

## 2018-01-17 DIAGNOSIS — F32.5 MAJOR DEPRESSION IN COMPLETE REMISSION (H): ICD-10-CM

## 2018-01-17 DIAGNOSIS — I10 ESSENTIAL HYPERTENSION, BENIGN: ICD-10-CM

## 2018-01-17 RX ORDER — METOPROLOL SUCCINATE 100 MG/1
100 TABLET, EXTENDED RELEASE ORAL DAILY
Qty: 90 TABLET | Refills: 3 | Status: CANCELLED | OUTPATIENT
Start: 2018-01-17

## 2018-01-17 RX ORDER — VENLAFAXINE HYDROCHLORIDE 150 MG/1
CAPSULE, EXTENDED RELEASE ORAL
Qty: 90 CAPSULE | Refills: 3 | Status: CANCELLED | OUTPATIENT
Start: 2018-01-17

## 2018-01-17 NOTE — TELEPHONE ENCOUNTER
Reason for Call:  Medication or medication refill:    Do you use a [facility]?  Name of the pharmacy and phone number for the current request:  [facility] [phone]    Name of the medication requested: Metoprolol, Venlafaxine  Metoprolol  Last Fill: 1/2/2018  QTY:  90 Refill: 3  Last OV:  1/2/2018     Venlafaxine  Last Fill:  1/2/2018  QTY:  90  Refill:  3  Last OV:  1/2/2018    Other request: pt is calling for a refill.  Her medication was filled on 1/2/2018.  The medication was delivered to her home and someone took it.  Please advise.    Can we leave a detailed message on this number? YES    Phone number patient can be reached at: Home number on file [phone] (home)    Best Time: any    Call taken on 1/17/2018 at 12:15 PM by Mckenzie Greenwood

## 2018-01-17 NOTE — TELEPHONE ENCOUNTER
"Patient advised she still has a full year at [facility]. She states she will check with them        Requested Prescriptions   Pending Prescriptions Disp Refills     metoprolol succinate (TOPROL-XL) 100 MG 24 hr tablet 90 tablet 3     Sig: Take 1 tablet (100 mg) by mouth daily    Beta-Blockers Protocol Failed    1/17/2018  3:53 PM       Failed - Blood pressure under 140/90    BP Readings from Last 3 Encounters:   01/02/18 (!) 171/103   07/11/17 147/79   12/20/16 122/69                Passed - Patient is age 6 or older       Passed - Recent or future visit with authorizing provider's specialty    Patient had office visit in the last year or has a visit in the next 30 days with authorizing provider.  See \"Patient Info\" tab in inbasket, or \"Choose Columns\" in Meds & Orders section of the refill encounter.             venlafaxine (EFFEXOR-XR) 150 MG 24 hr capsule 90 capsule 3     Sig: TAKE 1 CAPSULE EVERY DAY    Serotonin-Norepinephrine Reuptake Inhibitors  Failed    1/17/2018  3:53 PM       Failed - Blood pressure under 140/90    BP Readings from Last 3 Encounters:   01/02/18 (!) 171/103   07/11/17 147/79   12/20/16 122/69                Passed - PHQ-9 score of less than 5 in past 6 months    The PHQ-9 criteria is meant to fail. It requires a PHQ-9 score review         Passed - Patient is age 18 or older       Passed - No active pregnancy on record       Passed - Normal serum creatinine on file in past 12 months    Recent Labs   Lab Test  08/01/17   0759   CR  0.85            Passed - No positive pregnancy test in past 12 months       Passed - Recent (6 mo) or future visit with authorizing provider's specialty    Patient had office visit in the last 6 months or has a visit in the next 30 days with authorizing provider.  See \"Patient Info\" tab in inbasket, or \"Choose Columns\" in Meds & Orders section of the refill encounter.              "

## 2018-04-02 ENCOUNTER — TELEPHONE (OUTPATIENT)
Dept: FAMILY MEDICINE | Facility: CLINIC | Age: 68
End: 2018-04-02

## 2018-04-02 DIAGNOSIS — F32.5 MAJOR DEPRESSION IN COMPLETE REMISSION (H): ICD-10-CM

## 2018-04-02 DIAGNOSIS — I10 ESSENTIAL HYPERTENSION, BENIGN: ICD-10-CM

## 2018-04-02 RX ORDER — METOPROLOL SUCCINATE 100 MG/1
100 TABLET, EXTENDED RELEASE ORAL DAILY
Qty: 90 TABLET | Refills: 3 | Status: SHIPPED | OUTPATIENT
Start: 2018-04-02 | End: 2019-04-29

## 2018-04-02 RX ORDER — VENLAFAXINE HYDROCHLORIDE 150 MG/1
CAPSULE, EXTENDED RELEASE ORAL
Qty: 90 CAPSULE | Refills: 3 | Status: SHIPPED | OUTPATIENT
Start: 2018-04-02 | End: 2019-04-29

## 2018-05-18 ENCOUNTER — TELEPHONE (OUTPATIENT)
Dept: FAMILY MEDICINE | Facility: CLINIC | Age: 68
End: 2018-05-18

## 2018-05-18 NOTE — TELEPHONE ENCOUNTER
Panel Management Review      Patient has the following on her problem list:     Hypertension   Last three blood pressure readings:  BP Readings from Last 3 Encounters:   01/02/18 (!) 171/103   07/11/17 147/79   12/20/16 122/69     Blood pressure: MONITOR    HTN Guidelines:  Age 18-59 BP range:  Less than 140/90  Age 60-85 with Diabetes:  Less than 140/90  Age 60-85 without Diabetes:  less than 150/90      Composite cancer screening  Chart review shows that this patient is due/due soon for the following   Health Maintenance   Topic Date Due     DEPRESSION ACTION PLAN Q1 YR  07/23/2015     DEXA SCAN SCREENING (SYSTEM ASSIGNED)  11/21/2015     PHQ-9 Q6 MONTHS  07/02/2018     MAMMO Q1 YR  12/20/2018     FALL RISK ASSESSMENT  01/02/2019     LOW DOSE ASA FOR PRIMARY PREVENTION  01/02/2019     ADVANCE DIRECTIVE PLANNING Q5 YRS  12/20/2021     LIPID SCREEN Q5 YR FEMALE (SYSTEM ASSIGNED)  08/01/2022     COLONOSCOPY Q10 YR  06/16/2026     TETANUS Q10 YR  01/02/2028     PNEUMOCOCCAL  Completed     INFLUENZA VACCINE  Completed     HEPATITIS C SCREENING  Completed     Summary:    Patient is due/failing the following:   Blood pressure recheck    Action needed:   Patient needs office visit.    Type of outreach:    Sent letter.    Questions for provider review:    None                                                                                                                                    Ilene Gutierrez MA     Chart routed to none .

## 2018-05-31 ENCOUNTER — TELEPHONE (OUTPATIENT)
Dept: FAMILY MEDICINE | Facility: CLINIC | Age: 68
End: 2018-05-31

## 2018-05-31 ENCOUNTER — ALLIED HEALTH/NURSE VISIT (OUTPATIENT)
Dept: FAMILY MEDICINE | Facility: CLINIC | Age: 68
End: 2018-05-31
Payer: COMMERCIAL

## 2018-05-31 VITALS — SYSTOLIC BLOOD PRESSURE: 130 MMHG | HEART RATE: 76 BPM | DIASTOLIC BLOOD PRESSURE: 82 MMHG

## 2018-05-31 DIAGNOSIS — I10 ESSENTIAL HYPERTENSION, BENIGN: Primary | ICD-10-CM

## 2018-05-31 PROCEDURE — 99207 ZZC NO CHARGE NURSE ONLY: CPT

## 2018-05-31 NOTE — TELEPHONE ENCOUNTER
S-(situation): BP check    B-(background): Seen Dr Valladares 01/02/18    A-(assessment):   Vital Signs 5/31/2018   Systolic 130   Diastolic 82   Pulse 76     Pharmacy: [facility]  Home readings 123-141 /75-91  Patient states she does have some dizziness occasionally - about 2 times per week. Not sure if due to decrease in fluid intake and with heat. Doesn't happen all the time  Medications and allergies reviewed.    R-(recommendations): Advised to increase fluid intake and to schedule appointment with provider if increase in symptoms. Discussed positional changes Routing to provider for review.     Mandy NUNN RN

## 2018-05-31 NOTE — TELEPHONE ENCOUNTER
Agree with recommendations.  No changes to BP medications at this time.  Thanks.    Cristopher Valladares MD

## 2018-05-31 NOTE — MR AVS SNAPSHOT
After Visit Summary   5/31/2018    Shanelle Sepulveda    MRN: [id number]           Patient Information     Date Of Birth          1950        Visit Information        Provider Department      5/31/2018 9:45 AM [address]        Today's Diagnoses     Essential hypertension, benign    -  1       Follow-ups after your visit        Who to contact     If you have questions or need follow up information about today's clinic visit or your schedule please contact [facility] directly at [phone].  Normal or non-critical lab and imaging results will be communicated to you by [employer], letter or phone within 4 business days after the clinic has received the results. If you do not hear from us within 7 days, please contact the clinic through [employer] or phone. If you have a critical or abnormal lab result, we will notify you by phone as soon as possible.  Submit refill requests through [employer] or call your pharmacy and they will forward the refill request to us. Please allow 3 business days for your refill to be completed.          Additional Information About Your Visit        MyChart Information     [employer] gives you secure access to your electronic health record. If you see a primary care provider, you can also send messages to your care team and make appointments. If you have questions, please call your primary care clinic.  If you do not have a primary care provider, please call [phone] and they will assist you.        Care EveryWhere ID     This is your Care EveryWhere ID. This could be used by other organizations to access your [place] medical records  [id number]        Your Vitals Were     Pulse Last Period                76 10/24/2005           Blood Pressure from Last 3 Encounters:   05/31/18 130/82   01/02/18 (!) 171/103   07/11/17 147/79    Weight from Last 3 Encounters:   01/02/18 188 lb 6.4 oz (85.5 kg)   12/20/16 185 lb (83.9 kg)   06/16/16  180 lb (81.6 kg)              Today, you had the following     No orders found for display       Primary Care Provider Office Phone # Fax #    [facility] [phone] [address] [facility] [address]        Equal Access to Services     NOHEMI CRAWFORD : Hadii aad ku hadasho Soomaali, waaxda luqadaha, qaybta kaalmada adeegyada, waxay mkin hayaan agustin kiranmarioliang luciano. So [facility] [phone].    ATENCIÓN: Si habla español, tiene a cosme disposición servicios gratuitos de asistencia lingüística. Llame al [phone].    We comply with applicable federal civil rights laws and [place] laws. We do not discriminate on the basis of race, color, national origin, age, disability, sex, sexual orientation, or gender identity.            Thank you!     Thank you for choosing [facility]  for your care. Our goal is always to provide you with excellent care. Hearing back from our patients is one way we can continue to improve our services. Please take a few minutes to complete the written survey that you may receive in the mail after your visit with us. Thank you!             Your Updated Medication List - Protect others around you: Learn how to safely use, store and throw away your medicines at www.disposemymeds.org.          This list is accurate as of 5/31/18 10:35 AM.  Always use your most recent med list.                   Brand Name Dispense Instructions for use Diagnosis    ASPIRIN PO      Take 81 mg by mouth daily        fish oil-omega-3 fatty acids 1000 MG capsule     120 capsule    Take 1 capsule (1 g) by mouth 3 times daily        metoprolol succinate 100 MG 24 hr tablet    TOPROL-XL    90 tablet    Take 1 tablet (100 mg) by mouth daily    Essential hypertension, benign       MULTIVITAL PO      Take  by mouth.        pravastatin 20 MG tablet    PRAVACHOL    90 tablet    Take 1 tablet (20 mg) by mouth daily    Hyperlipidemia LDL goal <130       venlafaxine 150 MG 24 hr  capsule    EFFEXOR-XR    90 capsule    TAKE 1 CAPSULE EVERY DAY    Major depression in complete remission (H)

## 2018-06-24 DIAGNOSIS — E78.5 HYPERLIPIDEMIA LDL GOAL <130: ICD-10-CM

## 2018-06-27 ENCOUNTER — MYC MEDICAL ADVICE (OUTPATIENT)
Dept: FAMILY MEDICINE | Facility: CLINIC | Age: 68
End: 2018-06-27

## 2018-06-27 RX ORDER — PRAVASTATIN SODIUM 20 MG
TABLET ORAL
Qty: 90 TABLET | Refills: 3 | OUTPATIENT
Start: 2018-06-27

## 2018-06-27 NOTE — TELEPHONE ENCOUNTER
I have attempted to contact this patient by phone with the following results: no answer.    Xeebel message sent to pt to contact [facility] in [place] directly to arrange transfer of prescription to [facility].    Adeline GOLDBERG RN

## 2018-06-28 ENCOUNTER — TELEPHONE (OUTPATIENT)
Dept: FAMILY MEDICINE | Facility: CLINIC | Age: 68
End: 2018-06-28

## 2018-06-28 DIAGNOSIS — E78.5 HYPERLIPIDEMIA LDL GOAL <130: ICD-10-CM

## 2018-06-28 NOTE — TELEPHONE ENCOUNTER
Separate Refill Encounter initiated and sent to provider.   See pt's [employer] message. Last OV 1/2/18.    Medication Detail      Disp Refills Start End ANGEL   pravastatin (PRAVACHOL) 20 MG tablet 90 tablet 3 1/2/2018  No   Sig - Route: Take 1 tablet (20 mg) by mouth daily - Oral   Class: E-Prescribe   Notes to Pharmacy: The patient requests that this prescription be held on file for filling in the near future.   Order: [id number]   E-Prescribing Status: Receipt confirmed by pharmacy (1/2/2018 10:12 AM CST)     Lab Results   Component Value Date    CHOL 208 08/01/2017     Lab Results   Component Value Date    HDL 65 08/01/2017     Lab Results   Component Value Date     08/01/2017     Lab Results   Component Value Date    TRIG 139 08/01/2017     Lab Results   Component Value Date    CHOLHDLRATIO 2.2 04/06/2015     Adeline GOLDBERG RN

## 2018-06-28 NOTE — TELEPHONE ENCOUNTER
Routing refill request to provider for review/approval because:  Allergy/Contraindication: Atorvastatin    Pt wants script transferred to [facility] order.  Last Office Visit: 1/2/18  Last lipids 8/1/2017    Lab Results   Component Value Date    CHOL 208 08/01/2017     Lab Results   Component Value Date    HDL 65 08/01/2017     Lab Results   Component Value Date     08/01/2017     Lab Results   Component Value Date    TRIG 139 08/01/2017     Lab Results   Component Value Date    CHOLHDLRATIO 2.2 04/06/2015     Adeline GOLDBERG RN

## 2018-06-29 RX ORDER — PRAVASTATIN SODIUM 20 MG
20 TABLET ORAL DAILY
Qty: 90 TABLET | Refills: 1 | Status: SHIPPED | OUTPATIENT
Start: 2018-06-29 | End: 2019-04-29

## 2018-06-29 NOTE — TELEPHONE ENCOUNTER
RX faxed.  Message left for patient to return call to clinic.  CSS - ok to deliver message below.    Yumiko JIN RN

## 2018-08-14 ENCOUNTER — HOSPITAL ENCOUNTER (OUTPATIENT)
Dept: BONE DENSITY | Facility: CLINIC | Age: 68
Discharge: HOME | End: 2018-08-14
Attending: INTERNAL MEDICINE | Admitting: INTERNAL MEDICINE
Payer: MEDICARE

## 2018-08-14 DIAGNOSIS — Z78.0 POST-MENOPAUSE: ICD-10-CM

## 2018-08-14 PROCEDURE — 77080 DXA BONE DENSITY AXIAL: CPT

## 2018-10-05 ENCOUNTER — ALLIED HEALTH/NURSE VISIT (OUTPATIENT)
Dept: FAMILY MEDICINE | Facility: CLINIC | Age: 68
End: 2018-10-05
Payer: COMMERCIAL

## 2018-10-05 DIAGNOSIS — Z23 NEED FOR PROPHYLACTIC VACCINATION AND INOCULATION AGAINST INFLUENZA: Primary | ICD-10-CM

## 2018-10-05 PROCEDURE — G0008 ADMIN INFLUENZA VIRUS VAC: HCPCS

## 2018-10-05 PROCEDURE — 99207 ZZC NO CHARGE NURSE ONLY: CPT

## 2018-10-05 PROCEDURE — 90662 IIV NO PRSV INCREASED AG IM: CPT

## 2018-10-05 NOTE — PROGRESS NOTES

## 2018-10-05 NOTE — MR AVS SNAPSHOT
After Visit Summary   10/5/2018    Shanelle Sepulveda    MRN: [id number]           Patient Information     Date Of Birth          1950        Visit Information        Provider Department      10/5/2018 8:45 AM [facility]        Today's Diagnoses     Need for prophylactic vaccination and inoculation against influenza    -  1       Follow-ups after your visit        Your next 10 appointments already scheduled     Dec 27, 2018 10:30 AM CST   MA SCREENING DIGITAL BILATERAL with [id number]   [facility] ([facility])    [address] [address]   [phone]           How do I prepare for my exam? (Food and drink instructions) No Food and Drink Restrictions.  How do I prepare for my exam? (Other instructions) Do not use any powder, lotion or deodorant under your arms or on your breast. If you do, we will ask you to remove it before your exam.  What should I wear: Wear comfortable, two-piece clothing.  How long does the exam take: Most scans will take 15 minutes.  What should I bring: Bring any previous mammograms from other facilities or have them mailed to the breast center.  Do I need a :  No  is needed.  What do I need to tell my doctor: If you have any allergies, tell your care team.  What should I do after the exam: No restrictions, You may resume normal activities.  What is this test: This test is an x-ray of the breast to look for breast disease. The breast is pressed between two plates to flatten and spread the tissue. An X-ray is taken of the breast from different angles.  Who should I call with questions: If you have any questions, please call the Imaging Department where you will have your exam. Directions, parking instructions, and other information is available on our website, [email or web address].  Other information about my exam Three-dimensional (3D) mammograms are available at [facility]  "in [facility], [facility], [place], [facility] and [place].  Health locations include [place] and the [facility] in [place].  Benefits of 3D mammograms include * Improved rate of cancer detection * Decreases your chance of having to go back for more tests, which means fewer: * \"False-positive\" results (This means that there is an abnormal area but it isn't cancer.) * Invasive testing procedures, such as a biopsy or surgery * Can provide clearer images of the breast if you have dense breast tissue.  *3D mammography is an optional exam that anyone can have with a 2D mammogram. It doesn't replace or take the place of a 2D mammogram. 2D mammograms remain an effective screening test for all women.  Not all insurance companies cover the cost of a 3D mammogram. Check with your insurance. Three-dimensional (3D) mammograms are available at [facility] in [facility], [facility], [facility], and [place]. Health locations include [place] and [facility] in [place]. Benefits of 3D mammograms include: - Improved rate of cancer detection - Decreases your chance of having to go back for more tests, which means fewer: - \"False-positive\" results (This means that there is an abnormal area but it isn't cancer.) - Invasive testing procedures, such as a biopsy or surgery - Can provide clearer images of the breast if you have dense breast tissue. 3D mammography is an optional exam that anyone can have with a 2D mammogram. It doesn't replace or take the place of a 2D mammogram. 2D mammograms remain an effective screening test for all women.  Not all insurance companies cover the cost of a 3D mammogram. Check with your insurance.              Who to contact     If you have questions or need follow up information about today's clinic visit or your schedule please contact [facility] directly at " [phone].  Normal or non-critical lab and imaging results will be communicated to you by MyChart, letter or phone within 4 business days after the clinic has received the results. If you do not hear from us within 7 days, please contact the clinic through [employer] or phone. If you have a critical or abnormal lab result, we will notify you by phone as soon as possible.  Submit refill requests through [employer] or call your pharmacy and they will forward the refill request to us. Please allow 3 business days for your refill to be completed.          Additional Information About Your Visit        [employer] Information     [employer]ino gives you secure access to your electronic health record. If you see a primary care provider, you can also send messages to your care team and make appointments. If you have questions, please call your primary care clinic.  If you do not have a primary care provider, please call [phone] and they will assist you.        Care EveryWhere ID     This is your Care EveryWhere ID. This could be used by other organizations to access your [facility] medical records  [id number]        Your Vitals Were     Last Period                   10/24/2005            Blood Pressure from Last 3 Encounters:   05/31/18 130/82   01/02/18 (!) 171/103   07/11/17 147/79    Weight from Last 3 Encounters:   01/02/18 188 lb 6.4 oz (85.5 kg)   12/20/16 185 lb (83.9 kg)   06/16/16 180 lb (81.6 kg)              We Performed the Following     FLU VACCINE, INCREASED ANTIGEN, PRESV FREE, AGE 65+ [89193]     Vaccine Administration, Initial [59170]        Primary Care Provider Office Phone # Fax #    Cristopher Valladares [phone] [address] [address]        Equal Access to Services     PERRY CRAWFORD : Ignacio Black, benigno parnell, jaime lieberman. So [facility] [phone].    ATENCIÓN: Si habla español, tiene a cosme disposición  servicios gratuitos de asistencia lingüística. Corrine chan [phone].    We comply with applicable federal civil rights laws and [place] laws. We do not discriminate on the basis of race, color, national origin, age, disability, sex, sexual orientation, or gender identity.            Thank you!     Thank you for choosing [facility]  for your care. Our goal is always to provide you with excellent care. Hearing back from our patients is one way we can continue to improve our services. Please take a few minutes to complete the written survey that you may receive in the mail after your visit with us. Thank you!             Your Updated Medication List - Protect others around you: Learn how to safely use, store and throw away your medicines at www.disposemymeds.org.          This list is accurate as of 10/5/18  8:54 AM.  Always use your most recent med list.                   Brand Name Dispense Instructions for use Diagnosis    ASPIRIN PO      Take 81 mg by mouth daily        fish oil-omega-3 fatty acids 1000 MG capsule     120 capsule    Take 1 capsule (1 g) by mouth 3 times daily        metoprolol succinate 100 MG 24 hr tablet    TOPROL-XL    90 tablet    Take 1 tablet (100 mg) by mouth daily    Essential hypertension, benign       MULTIVITAL PO      Take  by mouth.        pravastatin 20 MG tablet    PRAVACHOL    90 tablet    Take 1 tablet (20 mg) by mouth daily    Hyperlipidemia LDL goal <130       venlafaxine 150 MG 24 hr capsule    EFFEXOR-XR    90 capsule    TAKE 1 CAPSULE EVERY DAY    Major depression in complete remission (H)

## 2018-12-27 ENCOUNTER — HOSPITAL ENCOUNTER (OUTPATIENT)
Dept: MAMMOGRAPHY | Facility: CLINIC | Age: 68
Discharge: HOME | End: 2018-12-27
Attending: INTERNAL MEDICINE | Admitting: INTERNAL MEDICINE
Payer: MEDICARE

## 2018-12-27 DIAGNOSIS — Z12.31 VISIT FOR SCREENING MAMMOGRAM: ICD-10-CM

## 2018-12-27 PROCEDURE — 77067 SCR MAMMO BI INCL CAD: CPT

## 2019-04-24 ASSESSMENT — ENCOUNTER SYMPTOMS
PARESTHESIAS: 0
DIARRHEA: 0
WEAKNESS: 0
COUGH: 0
DIZZINESS: 1
CONSTIPATION: 0
NAUSEA: 0
SHORTNESS OF BREATH: 0
FEVER: 0
HEADACHES: 0
BREAST MASS: 0
SORE THROAT: 0
MYALGIAS: 1
HEMATURIA: 0
JOINT SWELLING: 0
PALPITATIONS: 0
NERVOUS/ANXIOUS: 0
HEARTBURN: 0
ARTHRALGIAS: 0
FREQUENCY: 1
CHILLS: 0
DYSURIA: 0
ABDOMINAL PAIN: 0
HEMATOCHEZIA: 0
EYE PAIN: 0

## 2019-04-24 ASSESSMENT — ACTIVITIES OF DAILY LIVING (ADL): CURRENT_FUNCTION: NO ASSISTANCE NEEDED

## 2019-04-29 ENCOUNTER — OFFICE VISIT (OUTPATIENT)
Dept: FAMILY MEDICINE | Facility: CLINIC | Age: 69
End: 2019-04-29
Payer: COMMERCIAL

## 2019-04-29 VITALS
DIASTOLIC BLOOD PRESSURE: 84 MMHG | TEMPERATURE: 97.7 F | HEIGHT: 64 IN | WEIGHT: 195.2 LBS | OXYGEN SATURATION: 95 % | BODY MASS INDEX: 33.32 KG/M2 | SYSTOLIC BLOOD PRESSURE: 126 MMHG | RESPIRATION RATE: 14 BRPM | HEART RATE: 71 BPM

## 2019-04-29 DIAGNOSIS — R73.03 PRE-DIABETES: ICD-10-CM

## 2019-04-29 DIAGNOSIS — Z00.00 ENCOUNTER FOR MEDICARE ANNUAL WELLNESS EXAM: Primary | ICD-10-CM

## 2019-04-29 DIAGNOSIS — I10 ESSENTIAL HYPERTENSION, BENIGN: ICD-10-CM

## 2019-04-29 DIAGNOSIS — F32.5 MAJOR DEPRESSION IN COMPLETE REMISSION (H): ICD-10-CM

## 2019-04-29 DIAGNOSIS — E78.5 HYPERLIPIDEMIA LDL GOAL <130: ICD-10-CM

## 2019-04-29 LAB
CHOLEST SERPL-MCNC: 211 MG/DL
HBA1C MFR BLD: 5.6 % (ref 0–5.6)
HDLC SERPL-MCNC: 54 MG/DL
LDLC SERPL CALC-MCNC: 116 MG/DL
NONHDLC SERPL-MCNC: 157 MG/DL
TRIGL SERPL-MCNC: 206 MG/DL

## 2019-04-29 PROCEDURE — 83036 HEMOGLOBIN GLYCOSYLATED A1C: CPT | Performed by: INTERNAL MEDICINE

## 2019-04-29 PROCEDURE — 80061 LIPID PANEL: CPT | Performed by: INTERNAL MEDICINE

## 2019-04-29 PROCEDURE — G0439 PPPS, SUBSEQ VISIT: HCPCS | Performed by: INTERNAL MEDICINE

## 2019-04-29 PROCEDURE — 36415 COLL VENOUS BLD VENIPUNCTURE: CPT | Performed by: INTERNAL MEDICINE

## 2019-04-29 RX ORDER — PRAVASTATIN SODIUM 20 MG
20 TABLET ORAL DAILY
Qty: 90 TABLET | Refills: 3 | Status: SHIPPED | OUTPATIENT
Start: 2019-04-29 | End: 2020-04-20

## 2019-04-29 RX ORDER — VENLAFAXINE HYDROCHLORIDE 150 MG/1
CAPSULE, EXTENDED RELEASE ORAL
Qty: 90 CAPSULE | Refills: 3 | Status: SHIPPED | OUTPATIENT
Start: 2019-04-29 | End: 2020-04-20

## 2019-04-29 RX ORDER — METOPROLOL SUCCINATE 100 MG/1
100 TABLET, EXTENDED RELEASE ORAL DAILY
Qty: 90 TABLET | Refills: 3 | Status: SHIPPED | OUTPATIENT
Start: 2019-04-29 | End: 2020-04-20

## 2019-04-29 ASSESSMENT — MIFFLIN-ST. JEOR: SCORE: 1398.17

## 2019-04-29 ASSESSMENT — ANXIETY QUESTIONNAIRES
6. BECOMING EASILY ANNOYED OR IRRITABLE: NOT AT ALL
2. NOT BEING ABLE TO STOP OR CONTROL WORRYING: NOT AT ALL
3. WORRYING TOO MUCH ABOUT DIFFERENT THINGS: NOT AT ALL
1. FEELING NERVOUS, ANXIOUS, OR ON EDGE: NOT AT ALL
1. FEELING NERVOUS, ANXIOUS, OR ON EDGE: NOT AT ALL
2. NOT BEING ABLE TO STOP OR CONTROL WORRYING: NOT AT ALL
5. BEING SO RESTLESS THAT IT IS HARD TO SIT STILL: NOT AT ALL
GAD7 TOTAL SCORE: 0
5. BEING SO RESTLESS THAT IT IS HARD TO SIT STILL: NOT AT ALL
7. FEELING AFRAID AS IF SOMETHING AWFUL MIGHT HAPPEN: NOT AT ALL
GAD7 TOTAL SCORE: 0
7. FEELING AFRAID AS IF SOMETHING AWFUL MIGHT HAPPEN: NOT AT ALL
6. BECOMING EASILY ANNOYED OR IRRITABLE: NOT AT ALL
3. WORRYING TOO MUCH ABOUT DIFFERENT THINGS: NOT AT ALL

## 2019-04-29 ASSESSMENT — ACTIVITIES OF DAILY LIVING (ADL): CURRENT_FUNCTION: NO ASSISTANCE NEEDED

## 2019-04-29 ASSESSMENT — PATIENT HEALTH QUESTIONNAIRE - PHQ9
SUM OF ALL RESPONSES TO PHQ QUESTIONS 1-9: 1
5. POOR APPETITE OR OVEREATING: NOT AT ALL
5. POOR APPETITE OR OVEREATING: NOT AT ALL

## 2019-04-29 NOTE — PROGRESS NOTES
"SUBJECTIVE:   Shanelle Sepulveda is a 68 year old adult who presents for Preventive Visit.      Are you in the first 12 months of your Medicare coverage?  No    Healthy Habits:     In general, how would you rate your overall health?  Good    Frequency of exercise:  1 day/week    Duration of exercise:  15-30 minutes    Do you usually eat at least 4 servings of fruit and vegetables a day, include whole grains    & fiber and avoid regularly eating high fat or \"junk\" foods?  No    Taking medications regularly:  Yes    Medication side effects:  No significant flushing, Muscle aches and Lightheadedness    Ability to successfully perform activities of daily living:  No assistance needed    Home Safety:  No safety concerns identified    Hearing Impairment:  No hearing concerns    In the past 6 months, have you been bothered by leaking of urine? Yes    In general, how would you rate your overall mental or emotional health?  Good      PHQ-2 Total Score: 0    Additional concerns today:  Yes      Mood symptoms well controlled on venlafaxine    PHQ-9 SCORE 12/20/2016 1/2/2018 4/29/2019   PHQ-9 Total Score - - -   PHQ-9 Total Score 2 1 1       She continues to have brief episodes of light headedness (noted at last annual as well) that occur with change in position or with exertion.  No associated chest pain, palpitations, SOB.  Resolve in a few seconds when she rests.      Do you feel safe in your environment? Yes    Do you have a Health Care Directive? Yes: Patient states has Advance Directive and will bring in a copy to clinic.      Fall risk  Fallen 2 or more times in the past year?: No  Any fall with injury in the past year?: No    Cognitive Screening   1) Repeat 3 items (Leader, Season, Table)    2) Clock draw: NORMAL  3) 3 item recall: Recalls 3 objects  Results: 3 items recalled: COGNITIVE IMPAIRMENT LESS LIKELY    Mini-CogTM Copyright S Shannan. Licensed by the author for use in Health system; reprinted with " permission (amy@[facility]). All rights reserved.      Do you have sleep apnea, excessive snoring or daytime drowsiness?: snoring, some daytime drowsiness    Reviewed and updated as needed this visit by clinical staff  Tobacco  Allergies  Meds  Problems  Med Hx  Surg Hx  Fam Hx  Soc Hx          Reviewed and updated as needed this visit by Provider  Allergies  Meds  Problems        Social History     Tobacco Use     Smoking status: Never Smoker     Smokeless tobacco: Never Used   Substance Use Topics     Alcohol use: Yes     Comment: rare     If you drink alcohol do you typically have >3 drinks per day or >7 drinks per week? No    Alcohol Use 4/29/2019   Prescreen: >3 drinks/day or >7 drinks/week? -   Prescreen: >3 drinks/day or >7 drinks/week? No           CHANGE in pharmacy to [facility] - refill all meds      Current providers sharing in care for this patient include:   Patient Care Team:  Cristopher Valladares MD as PCP - General (Internal Medicine)  Cristopher Valladares MD as Assigned PCP    The following health maintenance items are reviewed in Epic and correct as of today:  Health Maintenance   Topic Date Due     ZOSTER IMMUNIZATION (2 of 3) 11/14/2011     LOW DOSE ASA FOR PRIMARY PREVENTION  01/02/2019     PHQ-9 Q6 MONTHS  10/29/2019     MAMMO Q1 YR  12/27/2019     MEDICARE ANNUAL WELLNESS VISIT  04/29/2020     FALL RISK ASSESSMENT  04/29/2020     LIPID MONITORING Q1 YEAR  04/29/2020     ADVANCE DIRECTIVE PLANNING Q5 YRS  12/20/2021     COLONOSCOPY Q10 YR  06/16/2026     DTAP/TDAP/TD IMMUNIZATION (3 - Td) 01/02/2028     DEXA SCAN SCREENING (SYSTEM ASSIGNED)  Completed     DEPRESSION ACTION PLAN  Completed     INFLUENZA VACCINE  Completed     HEPATITIS C SCREENING  Completed     IPV IMMUNIZATION  Aged Out     MENINGITIS IMMUNIZATION  Aged Out     Patient Active Problem List   Diagnosis     Essential hypertension, benign     Generalized anxiety disorder     Advanced directives, counseling/discussion      Overactive bladder     Atrophic vaginitis     Hyperlipidemia with target LDL less than 130     HTN, goal below 140/90     Mild major depression (H)     Urgency-frequency syndrome     Family history of malignant neoplasm of breast     Cervical cancer screening     Past Surgical History:   Procedure Laterality Date     COLONOSCOPY  02/17/2004    wnl     COLONOSCOPY N/A 6/16/2016    Procedure: COLONOSCOPY;  Surgeon: Lloyd Gutierrez MD;  Location: [facility]     HYSTERECTOMY, VAGINAL  4/18/06    Laparoscopic supracerv hyst-BSO       Social History     Tobacco Use     Smoking status: Never Smoker     Smokeless tobacco: Never Used   Substance Use Topics     Alcohol use: Yes     Comment: rare     Family History   Problem Relation Age of Onset     Hypertension Mother      Cancer Mother         pancreatic CA     Lipids Mother      Cancer Father         liver CA     Depression Father      Depression Sister      Arthritis Sister         left knee replacement     Breast Cancer Sister      Breast Cancer Sister          Current Outpatient Medications   Medication Sig Dispense Refill     ASPIRIN PO Take 81 mg by mouth daily       fish oil-omega-3 fatty acids (FISH OIL) 1000 MG capsule Take 1 capsule (1 g) by mouth 3 times daily 120 capsule 11     metoprolol succinate ER (TOPROL-XL) 100 MG 24 hr tablet Take 1 tablet (100 mg) by mouth daily 90 tablet 3     Multiple Vitamins-Minerals (MULTIVITAL PO) Take  by mouth.       pravastatin (PRAVACHOL) 20 MG tablet Take 1 tablet (20 mg) by mouth daily 90 tablet 3     venlafaxine (EFFEXOR-XR) 150 MG 24 hr capsule TAKE 1 CAPSULE EVERY DAY 90 capsule 3     Allergies   Allergen Reactions     Atorvastatin Other (See Comments)     Myalgia         Review of Systems  Constitutional, HEENT, cardiovascular, pulmonary, GI, , musculoskeletal, neuro, skin, endocrine and psych systems are negative, except as otherwise noted plus occasional mild constipation    Answers for HPI/ROS submitted by the patient on  "4/24/2019   Annual Exam:  Blood in stool: No  heartburn: No  peripheral edema: No  mood changes: No  Skin sensation changes: No  pelvic pain: No  vaginal bleeding: No  vaginal discharge: No  tenderness: No  breast mass: No  breast discharge: No    OBJECTIVE:   /84 (BP Location: Right arm, Patient Position: Sitting, Cuff Size: Adult Large)   Pulse 71   Temp 97.7  F (36.5  C) (Tympanic)   Resp 14   Ht 1.622 m (5' 3.86\")   Wt 88.5 kg (195 lb 3.2 oz)   LMP 10/24/2005   SpO2 95%   BMI 33.66 kg/m   Estimated body mass index is 33.66 kg/m  as calculated from the following:    Height as of this encounter: 1.622 m (5' 3.86\").    Weight as of this encounter: 88.5 kg (195 lb 3.2 oz).  Physical Exam  GENERAL: healthy, alert and no distress  EYES: Eyes grossly normal to inspection, PERRL and conjunctivae and sclerae normal  HENT: ear canals and TM's normal, nose and mouth without ulcers or lesions  NECK: no adenopathy, no asymmetry, masses, or scars and thyroid normal to palpation  RESP: lungs clear to auscultation - no rales, rhonchi or wheezes  BREAST: deferred, mammogram up to date  CV: regular rate and rhythm, normal S1 S2, no S3 or S4, no murmur, click or rub, no peripheral edema and peripheral pulses strong  ABDOMEN: soft, nontender, no hepatosplenomegaly, no masses and bowel sounds normal  MS: no gross musculoskeletal defects noted, no edema  SKIN: no suspicious lesions or rashes on upper torso, arms, headache and neck- leg exam deferred by patient  NEURO: Normal strength and tone, mentation intact and speech normal  PSYCH: mentation appears normal, affect normal/bright    Diagnostic Test Results:  Results for orders placed or performed in visit on 04/29/19 (from the past 24 hour(s))   Lipid panel reflex to direct LDL Fasting   Result Value Ref Range    Cholesterol 211 (H) <200 mg/dL    Triglycerides 206 (H) <150 mg/dL    HDL Cholesterol 54 >49 mg/dL    LDL Cholesterol Calculated 116 (H) <100 mg/dL    Non " "HDL Cholesterol 157 (H) <130 mg/dL   Hemoglobin A1c   Result Value Ref Range    Hemoglobin A1C 5.6 0 - 5.6 %       ASSESSMENT / PLAN:   1. Encounter for Medicare annual wellness exam      Immunizations: advised to check with insurance on Shingrix coverage    Lab Studies: lipids, A1C    Mammogram: done last Dec    Colonoscopy/FIT: due 2026    DEXA: completed    Advanced care planning: has at home       2. Essential hypertension, benign    Controlled, refills provided    - metoprolol succinate ER (TOPROL-XL) 100 MG 24 hr tablet; Take 1 tablet (100 mg) by mouth daily  Dispense: 90 tablet; Refill: 3    3. Hyperlipidemia LDL goal <130    Stable from last year, refills provided    - Lipid panel reflex to direct LDL Fasting  - pravastatin (PRAVACHOL) 20 MG tablet; Take 1 tablet (20 mg) by mouth daily  Dispense: 90 tablet; Refill: 3    4. Major depression in complete remission (H)    Controlled, refills provided    - venlafaxine (EFFEXOR-XR) 150 MG 24 hr capsule; TAKE 1 CAPSULE EVERY DAY  Dispense: 90 capsule; Refill: 3    5. Pre-diabetes    A1C 5.8 last year and 5.6 this year, back to normal range, continue to monitor yearly    - Hemoglobin A1c    End of Life Planning:  Patient currently has an advanced directive: Yes.  Practitioner is supportive of decision.    COUNSELING:  Reviewed preventive health counseling, as reflected in patient instructions       Regular exercise       Healthy diet/nutrition       Bladder control    BP Readings from Last 1 Encounters:   04/29/19 126/84     Estimated body mass index is 33.66 kg/m  as calculated from the following:    Height as of this encounter: 1.622 m (5' 3.86\").    Weight as of this encounter: 88.5 kg (195 lb 3.2 oz).      Weight management plan: Discussed healthy diet and exercise guidelines offer of RD consult was declined     reports that she has never smoked. She has never used smokeless tobacco.      Appropriate preventive services were discussed with this patient, " including applicable screening as appropriate for cardiovascular disease, diabetes, osteopenia/osteoporosis, and glaucoma.  As appropriate for age/gender, discussed screening for colorectal cancer, prostate cancer, breast cancer, and cervical cancer. Checklist reviewing preventive services available has been given to the patient.    Reviewed patients plan of care and provided an AVS. The Basic Care Plan (routine screening as documented in Health Maintenance) for Shanelle meets the Care Plan requirement. This Care Plan has been established and reviewed with the Patient.    Counseling Resources:  ATP IV Guidelines  Pooled Cohorts Equation Calculator  Breast Cancer Risk Calculator  FRAX Risk Assessment  ICSI Preventive Guidelines  Dietary Guidelines for Americans, 2010  [employer]'s MyPlate  ASA Prophylaxis  Lung CA Screening    Cristopher Valladares MD  [facility]    Identified Health Risks:    She is at risk for lack of exercise and has been provided with information to increase physical activity for the benefit of her well-being.  The patient was counseled and encouraged to consider modifying their diet and eating habits. She was provided with information on recommended healthy diet options.  Information on urinary incontinence and treatment options given to patient.  Information on urinary incontinence and treatment options given to patient.

## 2019-04-29 NOTE — LETTER
April 29, 2019      Shanelle Sepulveda  [address]  [place] [place] [address]        Dear ,    We are writing to inform you of your test results.    Cholesterol levels are similar to last year- continue your current medication.  Your A1C improved a bit and is back in the normal range!  We'll keep monitoring these yearly.     Resulted Orders   Lipid panel reflex to direct LDL Fasting   Result Value Ref Range    Cholesterol 211 (H) <200 mg/dL      Comment:      Desirable:       <200 mg/dl    Triglycerides 206 (H) <150 mg/dL      Comment:      Borderline high:  150-199 mg/dl  High:             200-499 mg/dl  Very high:       >499 mg/dl  Fasting specimen      HDL Cholesterol 54 >49 mg/dL    LDL Cholesterol Calculated 116 (H) <100 mg/dL      Comment:      Above desirable:  100-129 mg/dl  Borderline High:  130-159 mg/dL  High:             160-189 mg/dL  Very high:       >189 mg/dl      Non HDL Cholesterol 157 (H) <130 mg/dL      Comment:      Above Desirable:  130-159 mg/dl  Borderline high:  160-189 mg/dl  High:             190-219 mg/dl  Very high:       >219 mg/dl     Hemoglobin A1c   Result Value Ref Range    Hemoglobin A1C 5.6 0 - 5.6 %      Comment:      Normal <5.7% Prediabetes 5.7-6.4%  Diabetes 6.5% or higher - adopted from ADA   consensus guidelines.         If you have any questions or concerns, please call the clinic at the number listed above.       Sincerely,        Cristopher Valladares MD

## 2019-04-29 NOTE — PATIENT INSTRUCTIONS
I'd recommend checking with your insurance to see if they cover the new shingles vaccine, Shingrix.  This vaccine is much more effective than the older shingles vaccine.    Patient Education   Personalized Prevention Plan  You are due for the preventive services outlined below.  Your care team is available to assist you in scheduling these services.  If you have already completed any of these items, please share that information with your care team to update in your medical record.  Health Maintenance Due   Topic Date Due     Zoster (Shingles) Vaccine (2 of 3) 11/14/2011     Depression Assessment - every 6 months  07/02/2018     Cholesterol Lab - yearly  08/01/2018     Annual Wellness Visit  01/02/2019     FALL RISK ASSESSMENT  01/02/2019     LOW DOSE ASA FOR PRIMARY PREVENTION  01/02/2019       Exercise for a Healthier Heart     Exercise with a friend. When activity is fun, you're more likely to stick with it.   You may wonder how you can improve the health of your heart. If you re thinking about exercise, you re on the right track. You don t need to become an athlete, but you do need a certain amount of brisk exercise to help strengthen your heart. If you have been diagnosed with a heart condition, your doctor may recommend exercise to help stabilize your condition. To help make exercise a habit, choose safe, fun activities.  Be sure to check with your healthcare provider before starting an exercise program.   Why exercise?  Exercising regularly offers many healthy rewards. It can help you do all of the following:    Improve your blood cholesterol level to help prevent further heart trouble    Lower your blood pressure to help prevent a stroke or heart attack    Control diabetes, or reduce your risk of getting this disease    Improve your heart and lung function    Reach and maintain a healthy weight    Make your muscles stronger and more limber so you can stay active    Prevent falls and fractures by slowing the  loss of bone mass (osteoporosis)    Manage stress better    Reduce your blood pressure    Improve your sense of self and your body image  Exercise tips  Ease into your routine. Set small goals. Then build on them.  Exercise on most days. Aim for a total of 150 or more minutes of moderate to  vigorous intensity activity each week. Consider 40 minutes, 3 to 4 times a week. For best results, activity should last for 40 minutes on average. It is OK to work up to the 40 minute period over time. Examples of moderate-intensity activity is walking 1 mile in 15 minutes or 30 to 45 minutes of yard work.  Step up your daily activity level. Along with your exercise program, try being more active throughout the day. Walk instead of drive. Do more household tasks or yard work.  Choose one or more activities you enjoy. Walking is one of the easiest things you can do. You can also try swimming, riding a bike, dancing, or taking an exercise class.  Stop exercising and call your doctor if you:    Have chest pain or feel dizzy or lightheaded    Feel burning, tightness, pressure, or heaviness in your chest, neck, shoulders, back, or arms    Have unusual shortness of breath    Have increased joint or muscle pain    Have palpitations or an irregular heartbeat   Date Last Reviewed: 5/1/2016 2000-2018 [employer]. [address] [place] [address]. All rights reserved. This information is not intended as a substitute for professional medical care. Always follow your healthcare professional's instructions.          Understanding USDA MyPlate  The USDA (U.S. Department of Agriculture) has guidelines to help you make healthy food choices. These are called MyPlate. MyPlate shows the food groups that make up healthy meals using the image of a place setting. Before you eat, think about the healthiest choices for what to put onto your plate or into your cup or bowl. To learn more about building a healthy plate, visit  www.choosemyplate.gov.    The food groups    Fruits. Any fruit or 100% fruit juice counts as part of the Fruit Group. Fruits may be fresh, canned, frozen, or dried, and may be whole, cut-up, or pureed. Make half your plate fruits and vegetables.    Vegetables. Any vegetable or 100% vegetable juice counts as a member of the Vegetable Group. Vegetables may be fresh, frozen, canned, or dried. They can be served raw or cooked and may be whole, cut-up, or mashed. Make half your plate fruits and vegetables.    Grains. All foods made from grains are part of the Grains Group. These include wheat, rice, oats, cornmeal, and barley such as bread, pasta, oatmeal, cereal, tortillas, and grits. Grains should be no more than a quarter of your plate. At least half of your grains should be whole grains.    Protein. This group includes meat, poultry, seafood, beans and peas, eggs, processed soy products (like tofu), nuts (including nut butters), and seeds. Make protein choices no more than a quarter of your plate. Meat and poultry choices should be lean or low fat.    Dairy. All fluid milk products and foods made from milk that contain calcium, like yogurt and cheese, are part of the Dairy Group. (Foods that have little calcium, such as cream, butter, and cream cheese, are not part of the group.) Most dairy choices should be low-fat or fat-free.    Oils. These are fats that are liquid at room temperature. They include canola, corn, olive, soybean, and sunflower oil. Foods that are mainly oil include mayonnaise, certain salad dressings, and soft margarines. You should have only 5 to 7 teaspoons of oils a day. You probably already get this much from the food you eat.  Date Last Reviewed: 8/1/2017 2000-2018 [employer]. [address] [place] [address], [place] [address]. All rights reserved. This information is not intended as a substitute for professional medical care. Always follow your healthcare professional's  instructions.          Urinary Incontinence (Male)    Urinary incontinence means not being able to control the release of urine from the bladder.  Causes  Common causes of urinary incontinence in men include:    Infection    Certain medicines    Aging    Poor pelvic muscle tone    Bladder spasms    Obesity    Urinary retention  Nervous system diseases, diabetes, sleep apnea, urinary tract infections, prostate surgery, and pelvic trauma can also cause incontinence. Constipation and smoking have also been identified as risk factors.  Symptoms    Urge incontinence (also called  overactive bladder ) is a sudden urge to urinate even though there may not be much urine in the bladder. The need to urinate often during the night is common. It is due to bladder spasms.    Stress incontinence is involuntary urine leakage that can occur with sneezing, coughing, and other actions that put stress on the bladder.  Treatment  Treatment of urinary incontinence depends on the cause. Infections of the bladder are treated with antibiotics. Urinary retention is treated with a bladder catheter.  Home care  Follow these guidelines when caring for yourself at home:    Don't consume foods and drinks that may irritate the bladder. These include drinks containing alcohol, caffeinate, or carbonation; chocolate; and acidic fruits and juices.    Limit fluid intake to 6 to 8 cups a day.    Lose weight if you are overweight. This will reduce your symptoms.    If needed, wear absorbent pads to catch urine. Change pads frequently to maintain hygiene and prevent skin and bladder infections.    Bathe daily to maintain good hygiene.    If an antibiotic was prescribed to treat a bladder infection, be sure to take it until finished, even if you are feeling better before then. This is to make sure your infection has cleared.    If a catheter was left in place, it is important to keep bacteria from getting into the collection bag. Don't disconnect the  catheter from the collection bag.    Use a leg band to secure the catheter drainage tube, so it does not pull on the catheter. Drain the collection bag when it becomes full using the drain spout at the bottom of the bag. Don't disconnect the bag from the catheter.    Don't pull on or try to remove a catheter. The catheter must be removed by a healthcare provider.  Follow-up care  Follow up with your healthcare provider, or as advised.  When to seek medical advice  Call your healthcare provider right away if any of these occur:    Fever over 100.4 F (38 C), or as directed by your healthcare provider    Bladder pain or fullness    Abdominal swelling, nausea, or vomiting    Back pain    Weakness, dizziness, or fainting    If a catheter was left in place, return if:  ? Catheter falls out  ? Catheter stops draining for 6 hours  Date Last Reviewed: 10/1/2017    0371-8829 [employer]. [address] [place] [address]. All rights reserved. This information is not intended as a substitute for professional medical care. Always follow your healthcare professional's instructions.          Urinary Incontinence, Female (Adult)  Urinary incontinence means loss of control of the bladder. This problem affects many women, especially as they get older. If you have incontinence, you may be embarrassed to ask for help. But know that this problem can be treated.  Types of Incontinence  There are different types of incontinence. Two of the main types are described here. You can have more than one type.    Stress incontinence. With this type, urine leaks when pressure (stress) is put on the bladder. This may happen when you cough, sneeze, or laugh. Stress incontinence most often occurs because the pelvic floor muscles that support the bladder and urethra are weak. This can happen after pregnancy and vaginal childbirth or a hysterectomy. It can also be due to excess body weight or hormone changes.    Urge incontinence  (also called overactive bladder). With this type, a sudden urge to urinate is felt often. This may happen even though there may not be much urine in the bladder. The need to urinate often during the night is common. Urge incontinence most often occurs because of bladder spasms. This may be due to bladder irritation or infection. Damage to bladder nerves or pelvic muscles, constipation, and certain medicines can also lead to urge incontinence.  Treatment of urinary incontinence depends on the cause. Further evaluation is needed to find the type you have. This will likely include an exam and certain tests. Based on the results, you and your healthcare provider can then plan treatment. Until a diagnosis is made, the home care tips below can help relieve symptoms.  Home care    Do pelvic floor muscle exercises, if they are prescribed. The pelvic floor muscles help support the bladder and urethra. Many women find that their symptoms improve when doing special exercises that strengthen these muscles. To do the exercises contract the muscles you would use to stop your stream of urine, but do this when you re not urinating. Hold for 10 seconds, then relax. Repeat 10 to 20 times in a row, at least 3 times a day. Your provider may give you other instructions for how to do the exercises and how often.    Keep a bladder diary. This helps track how often and how much you urinate over a set period of time. Bring this diary with you to your next visit with the provider. The information can help your provider learn more about your bladder problem.    Lose weight, if advised to by your provider. Excess weight puts pressure on the bladder. Your provider can help you create a weight-loss plan that s right for you. This may include exercising more and making certain diet changes.    Don't consume foods and drinks that may irritate the bladder. These can include alcohol and caffeinated drinks.    Quit smoking. Smoking and other tobacco  use can lead to chronic cough that strains the pelvic floor muscles. Smoking may also damage the bladder and urethra. Talk with your provider about treatments or methods you can use to quit smoking.    If drinking large amounts of fluid causes you to have symptoms, you may be advised to limit your fluid intake. You may also be advised to drink most of your fluids during the day and to limit fluids at night.    If you re worried about urine leakage or accidents, you may wear absorbent pads to catch urine. Change the pads often. This helps reduce discomfort. It may also reduce the risk of skin or bladder infections.  Follow-up care  Follow up with your healthcare provider, or as directed. It may take some to find the right treatment for your problem. Your treatment plan may include special therapies or medicines. Certain procedures or surgery may also be options. Be sure to discuss any questions you have with your provider.  When to seek medical advice  Call the healthcare provider right away if any of these occur:    Fever of 100.4 F (38 C) or higher, or as directed by your provider    Bladder pain or fullness    Abdominal swelling    Nausea or vomiting    Back pain    Weakness, dizziness or fainting  Date Last Reviewed: 10/1/2017    3212-8753 [employer]. [address] [place] [address], [place] [address]. All rights reserved. This information is not intended as a substitute for professional medical care. Always follow your healthcare professional's instructions.

## 2019-04-30 ASSESSMENT — ANXIETY QUESTIONNAIRES: GAD7 TOTAL SCORE: 0

## 2019-09-25 ENCOUNTER — IMMUNIZATION (OUTPATIENT)
Dept: FAMILY MEDICINE | Facility: CLINIC | Age: 69
End: 2019-09-25
Payer: COMMERCIAL

## 2019-09-25 PROCEDURE — 90662 IIV NO PRSV INCREASED AG IM: CPT

## 2019-09-25 PROCEDURE — G0008 ADMIN INFLUENZA VIRUS VAC: HCPCS

## 2019-11-03 ENCOUNTER — HEALTH MAINTENANCE LETTER (OUTPATIENT)
Age: 69
End: 2019-11-03

## 2019-12-30 ENCOUNTER — HOSPITAL ENCOUNTER (OUTPATIENT)
Dept: MAMMOGRAPHY | Facility: CLINIC | Age: 69
Discharge: HOME | End: 2019-12-30
Attending: INTERNAL MEDICINE | Admitting: INTERNAL MEDICINE
Payer: COMMERCIAL

## 2019-12-30 DIAGNOSIS — Z12.31 VISIT FOR SCREENING MAMMOGRAM: ICD-10-CM

## 2019-12-30 PROCEDURE — 77063 BREAST TOMOSYNTHESIS BI: CPT

## 2020-03-16 ENCOUNTER — MYC REFILL (OUTPATIENT)
Dept: FAMILY MEDICINE | Facility: CLINIC | Age: 70
End: 2020-03-16

## 2020-03-16 DIAGNOSIS — F32.5 MAJOR DEPRESSION IN COMPLETE REMISSION (H): ICD-10-CM

## 2020-03-16 DIAGNOSIS — I10 ESSENTIAL HYPERTENSION, BENIGN: ICD-10-CM

## 2020-03-16 DIAGNOSIS — E78.5 HYPERLIPIDEMIA LDL GOAL <130: ICD-10-CM

## 2020-03-16 RX ORDER — VENLAFAXINE HYDROCHLORIDE 150 MG/1
CAPSULE, EXTENDED RELEASE ORAL
Qty: 90 CAPSULE | Refills: 3 | Status: CANCELLED | OUTPATIENT
Start: 2020-03-16

## 2020-03-16 RX ORDER — PRAVASTATIN SODIUM 20 MG
20 TABLET ORAL DAILY
Qty: 90 TABLET | Refills: 3 | Status: CANCELLED | OUTPATIENT
Start: 2020-03-16

## 2020-03-16 RX ORDER — METOPROLOL SUCCINATE 100 MG/1
100 TABLET, EXTENDED RELEASE ORAL DAILY
Qty: 90 TABLET | Refills: 3 | Status: CANCELLED | OUTPATIENT
Start: 2020-03-16

## 2020-04-20 ENCOUNTER — MYC REFILL (OUTPATIENT)
Dept: FAMILY MEDICINE | Facility: CLINIC | Age: 70
End: 2020-04-20

## 2020-04-20 DIAGNOSIS — E78.5 HYPERLIPIDEMIA LDL GOAL <130: ICD-10-CM

## 2020-04-20 DIAGNOSIS — I10 ESSENTIAL HYPERTENSION, BENIGN: ICD-10-CM

## 2020-04-20 DIAGNOSIS — F32.5 MAJOR DEPRESSION IN COMPLETE REMISSION (H): ICD-10-CM

## 2020-04-23 RX ORDER — VENLAFAXINE HYDROCHLORIDE 150 MG/1
CAPSULE, EXTENDED RELEASE ORAL
Qty: 90 CAPSULE | Refills: 0 | Status: SHIPPED | OUTPATIENT
Start: 2020-04-23 | End: 2020-08-10

## 2020-04-23 RX ORDER — METOPROLOL SUCCINATE 100 MG/1
100 TABLET, EXTENDED RELEASE ORAL DAILY
Qty: 90 TABLET | Refills: 0 | Status: SHIPPED | OUTPATIENT
Start: 2020-04-23 | End: 2020-08-10

## 2020-04-23 RX ORDER — PRAVASTATIN SODIUM 20 MG
20 TABLET ORAL DAILY
Qty: 90 TABLET | Refills: 0 | Status: SHIPPED | OUTPATIENT
Start: 2020-04-23 | End: 2020-08-10

## 2020-04-23 NOTE — TELEPHONE ENCOUNTER
"Requested Prescriptions   Pending Prescriptions Disp Refills     metoprolol succinate ER (TOPROL-XL) 100 MG 24 hr tablet 90 tablet 3     Sig: Take 1 tablet (100 mg) by mouth daily       Beta-Blockers Protocol Passed - 4/20/2020  5:57 PM        Passed - Blood pressure under 140/90 in past 12 months     BP Readings from Last 3 Encounters:   04/29/19 126/84   05/31/18 130/82   01/02/18 (!) 171/103                 Passed - Patient is age 6 or older        Passed - Recent (12 mo) or future (30 days) visit within the authorizing provider's specialty     Patient has had an office visit with the authorizing provider or a provider within the authorizing providers department within the previous 12 mos or has a future within next 30 days. See \"Patient Info\" tab in inbasket, or \"Choose Columns\" in Meds & Orders section of the refill encounter.              Passed - Medication is active on med list      Prescription approved per [facility] Refill Protocol.           pravastatin (PRAVACHOL) 20 MG tablet 90 tablet 3     Sig: Take 1 tablet (20 mg) by mouth daily       Statins Protocol Passed - 4/20/2020  5:57 PM        Passed - LDL on file in past 12 months     Recent Labs   Lab Test 04/29/19  0853   *             Passed - No abnormal creatine kinase in past 12 months     No lab results found.             Passed - Recent (12 mo) or future (30 days) visit within the authorizing provider's specialty     Patient has had an office visit with the authorizing provider or a provider within the authorizing providers department within the previous 12 mos or has a future within next 30 days. See \"Patient Info\" tab in inbasket, or \"Choose Columns\" in Meds & Orders section of the refill encounter.              Passed - Medication is active on med list        Passed - Patient is age 18 or older        Passed - No active pregnancy on record        Passed - No positive pregnancy test in past 12 months      Prescription approved per [facility] Refill " "Protocol.               venlafaxine (EFFEXOR-XR) 150 MG 24 hr capsule 90 capsule 3     Sig: TAKE 1 CAPSULE EVERY DAY       Serotonin-Norepinephrine Reuptake Inhibitors  Failed - 4/20/2020  5:57 PM        Failed - PHQ-9 score of less than 5 in past 6 months     Please review last PHQ-9 score.           Failed - Normal serum creatinine on file in past 12 months     Recent Labs   Lab Test 08/01/17  0759   CR 0.85       Ok to refill medication if creatinine is low          Failed - Recent (6 mo) or future (30 days) visit within the authorizing provider's specialty     Patient had office visit in the last 6 months or has a visit in the next 30 days with authorizing provider or within the authorizing provider's specialty.  See \"Patient Info\" tab in inbasket, or \"Choose Columns\" in Meds & Orders section of the refill encounter.            Passed - Blood pressure under 140/90 in past 12 months     BP Readings from Last 3 Encounters:   04/29/19 126/84   05/31/18 130/82   01/02/18 (!) 171/103                 Passed - Medication is active on med list        Passed - Patient is age 18 or older        Passed - No active pregnancy on record        Passed - No positive pregnancy test in past 12 months           PHQ-9 score:    PHQ 4/29/2019   PHQ-9 Total Score 1   Q9: Thoughts of better off dead/self-harm past 2 weeks Not at all     Routing refill request to provider for review/approval because:  Labs not current:  Cr last completed 2017                  "

## 2020-04-23 NOTE — TELEPHONE ENCOUNTER
One refill sent, should schedule virtual visit for further follow-up since it has almost been a year since last visit.    Thanks,  Cristopher Valladares MD

## 2020-08-09 ASSESSMENT — ENCOUNTER SYMPTOMS
FEVER: 0
HEARTBURN: 0
PARESTHESIAS: 0
HEMATOCHEZIA: 0
HEADACHES: 0
BREAST MASS: 0
EYE PAIN: 0
NAUSEA: 0
SHORTNESS OF BREATH: 0
CONSTIPATION: 1
PALPITATIONS: 0
ABDOMINAL PAIN: 0
SORE THROAT: 0
JOINT SWELLING: 0
COUGH: 0
DIZZINESS: 0
ARTHRALGIAS: 0
FREQUENCY: 1
DIARRHEA: 0
NERVOUS/ANXIOUS: 0
CHILLS: 0
MYALGIAS: 0
WEAKNESS: 0
DYSURIA: 0
HEMATURIA: 0

## 2020-08-09 ASSESSMENT — ACTIVITIES OF DAILY LIVING (ADL): CURRENT_FUNCTION: NO ASSISTANCE NEEDED

## 2020-08-10 ENCOUNTER — OFFICE VISIT (OUTPATIENT)
Dept: FAMILY MEDICINE | Facility: CLINIC | Age: 70
End: 2020-08-10
Payer: COMMERCIAL

## 2020-08-10 VITALS
HEART RATE: 76 BPM | DIASTOLIC BLOOD PRESSURE: 92 MMHG | HEIGHT: 64 IN | OXYGEN SATURATION: 96 % | SYSTOLIC BLOOD PRESSURE: 152 MMHG | BODY MASS INDEX: 33.63 KG/M2 | WEIGHT: 197 LBS | TEMPERATURE: 98.3 F | RESPIRATION RATE: 18 BRPM

## 2020-08-10 DIAGNOSIS — E78.5 HYPERLIPIDEMIA LDL GOAL <130: ICD-10-CM

## 2020-08-10 DIAGNOSIS — Z00.00 ENCOUNTER FOR MEDICARE ANNUAL WELLNESS EXAM: Primary | ICD-10-CM

## 2020-08-10 DIAGNOSIS — F32.5 MAJOR DEPRESSION IN COMPLETE REMISSION (H): ICD-10-CM

## 2020-08-10 DIAGNOSIS — Z12.31 VISIT FOR SCREENING MAMMOGRAM: ICD-10-CM

## 2020-08-10 DIAGNOSIS — I10 ESSENTIAL HYPERTENSION, BENIGN: ICD-10-CM

## 2020-08-10 DIAGNOSIS — R73.03 PRE-DIABETES: ICD-10-CM

## 2020-08-10 LAB
CHOLEST SERPL-MCNC: 206 MG/DL
CREAT SERPL-MCNC: 0.82 MG/DL (ref 0.52–1.04)
GFR SERPL CREATININE-BSD FRML MDRD: 73 ML/MIN/{1.73_M2}
HBA1C MFR BLD: 6.1 % (ref 0–5.6)
HDLC SERPL-MCNC: 56 MG/DL
LDLC SERPL CALC-MCNC: 111 MG/DL
NONHDLC SERPL-MCNC: 150 MG/DL
TRIGL SERPL-MCNC: 197 MG/DL

## 2020-08-10 PROCEDURE — 36415 COLL VENOUS BLD VENIPUNCTURE: CPT | Performed by: INTERNAL MEDICINE

## 2020-08-10 PROCEDURE — 99397 PER PM REEVAL EST PAT 65+ YR: CPT | Performed by: INTERNAL MEDICINE

## 2020-08-10 PROCEDURE — 82565 ASSAY OF CREATININE: CPT | Performed by: INTERNAL MEDICINE

## 2020-08-10 PROCEDURE — 80061 LIPID PANEL: CPT | Performed by: INTERNAL MEDICINE

## 2020-08-10 PROCEDURE — 83036 HEMOGLOBIN GLYCOSYLATED A1C: CPT | Performed by: INTERNAL MEDICINE

## 2020-08-10 PROCEDURE — 99214 OFFICE O/P EST MOD 30 MIN: CPT | Mod: 25 | Performed by: INTERNAL MEDICINE

## 2020-08-10 RX ORDER — VENLAFAXINE HYDROCHLORIDE 150 MG/1
CAPSULE, EXTENDED RELEASE ORAL
Qty: 90 CAPSULE | Refills: 3 | Status: SHIPPED | OUTPATIENT
Start: 2020-08-10 | End: 2021-08-16

## 2020-08-10 RX ORDER — PRAVASTATIN SODIUM 20 MG
20 TABLET ORAL DAILY
Qty: 90 TABLET | Refills: 3 | Status: SHIPPED | OUTPATIENT
Start: 2020-08-10 | End: 2021-08-16

## 2020-08-10 RX ORDER — METOPROLOL SUCCINATE 200 MG/1
200 TABLET, EXTENDED RELEASE ORAL DAILY
Qty: 90 TABLET | Refills: 3 | Status: SHIPPED | OUTPATIENT
Start: 2020-08-10 | End: 2021-08-16

## 2020-08-10 ASSESSMENT — ENCOUNTER SYMPTOMS
NAUSEA: 0
HEMATURIA: 0
COUGH: 0
PARESTHESIAS: 0
DYSURIA: 0
ABDOMINAL PAIN: 0
CHILLS: 0
BREAST MASS: 0
ARTHRALGIAS: 0
CONSTIPATION: 1
SORE THROAT: 0
DIZZINESS: 0
JOINT SWELLING: 0
MYALGIAS: 0
FEVER: 0
HEARTBURN: 0
WEAKNESS: 0
PALPITATIONS: 0
EYE PAIN: 0
DIARRHEA: 0
NERVOUS/ANXIOUS: 0
SHORTNESS OF BREATH: 0
HEADACHES: 0
FREQUENCY: 1
HEMATOCHEZIA: 0

## 2020-08-10 ASSESSMENT — ACTIVITIES OF DAILY LIVING (ADL): CURRENT_FUNCTION: NO ASSISTANCE NEEDED

## 2020-08-10 ASSESSMENT — MIFFLIN-ST. JEOR: SCORE: 1399.62

## 2020-08-10 NOTE — PROGRESS NOTES
"SUBJECTIVE:   Shanelle Sepulveda is a 69 year old female who presents for Preventive Visit.  Are you in the first 12 months of your Medicare coverage?  No    Healthy Habits:     In general, how would you rate your overall health?  Good    Frequency of exercise:  2-3 days/week    Duration of exercise:  15-30 minutes    Do you usually eat at least 4 servings of fruit and vegetables a day, include whole grains    & fiber and avoid regularly eating high fat or \"junk\" foods?  No    Taking medications regularly:  Yes    Medication side effects:  Lightheadedness    Ability to successfully perform activities of daily living:  No assistance needed    Home Safety:  No safety concerns identified    Hearing Impairment:  Difficulty understanding soft or whispered speech    In the past 6 months, have you been bothered by leaking of urine? Yes    In general, how would you rate your overall mental or emotional health?  Good      PHQ-2 Total Score: 0    Additional concerns today:  No      Needs med refills    HTN: She is not checking home BPs.  Trying to follow a low salt diet.      Depression:  She feels like the venlafaxine is working well.  PHQ9 was completed by patient but apparently not logged by MA before it was erased.  Score was 2 from what I recall.  GAD7 score was 0.      PHQ 12/20/2016 1/2/2018 4/29/2019   PHQ-9 Total Score 2 1 1   Q9: Thoughts of better off dead/self-harm past 2 weeks Not at all Not at all Not at all         DLP: taking pravastatin    They are going to be having new triplet grandchildren, so they may be moving to be closer.      Do you feel safe in your environment? Yes    Have you ever done Advance Care Planning? (For example, a Health Directive, POLST, or a discussion with a medical provider or your loved ones about your wishes): Yes, patient states has an Advance Care Planning document and will bring a copy to the clinic.      Fall risk  Fallen 2 or more times in the past year?: No  Any fall with injury " in the past year?: No    Cognitive Screening   1) Repeat 3 items (Leader, Season, Table)    2) Clock draw: NORMAL  3) 3 item recall: Recalls 3 objects  Results: 3 items recalled: COGNITIVE IMPAIRMENT LESS LIKELY    Mini-CogTM Copyright VJ Campbell. Licensed by the author for use in [facility]; reprinted with permission (amy@[email or web address]). All rights reserved.      Do you have sleep apnea, excessive snoring or daytime drowsiness?: yes    Reviewed and updated as needed this visit by clinical staff  Tobacco  Allergies  Med Hx  Surg Hx  Fam Hx  Soc Hx        Reviewed and updated as needed this visit by Provider        Social History     Tobacco Use     Smoking status: Never Smoker     Smokeless tobacco: Never Used   Substance Use Topics     Alcohol use: Yes     Comment: rare     If you drink alcohol do you typically have >3 drinks per day or >7 drinks per week? Not applicable    Alcohol Use 8/10/2020   Prescreen: >3 drinks/day or >7 drinks/week? -   Prescreen: >3 drinks/day or >7 drinks/week? Not Applicable             Current providers sharing in care for this patient include:   Patient Care Team:  Cristopher Valladares MD as PCP - General (Internal Medicine)  Cristopher Valladares MD as Assigned PCP    The following health maintenance items are reviewed in Epic and correct as of today:  Health Maintenance   Topic Date Due     ZOSTER IMMUNIZATION (2 of 3) 11/14/2011     PHQ-9  10/29/2019     MEDICARE ANNUAL WELLNESS VISIT  04/29/2020     LIPID  04/29/2020     FALL RISK ASSESSMENT  04/29/2020     INFLUENZA VACCINE (1) 09/01/2020     MAMMO SCREENING  12/30/2020     ADVANCE CARE PLANNING  12/20/2021     COLORECTAL CANCER SCREENING  06/16/2026     DTAP/TDAP/TD IMMUNIZATION (3 - Td) 01/02/2028     DEXA  Completed     HEPATITIS C SCREENING  Completed     DEPRESSION ACTION PLAN  Completed     PNEUMOCOCCAL IMMUNIZATION 65+ LOW/MEDIUM RISK  Completed     IPV IMMUNIZATION  Aged Out     MENINGITIS IMMUNIZATION  Aged Out     Patient  Active Problem List   Diagnosis     Essential hypertension, benign     Generalized anxiety disorder     Advanced directives, counseling/discussion     Overactive bladder     Atrophic vaginitis     Hyperlipidemia with target LDL less than 130     HTN, goal below 140/90     Mild major depression (H)     Urgency-frequency syndrome     Family history of malignant neoplasm of breast     Cervical cancer screening     Past Surgical History:   Procedure Laterality Date     COLONOSCOPY  02/17/2004    wnl     COLONOSCOPY N/A 6/16/2016    Procedure: COLONOSCOPY;  Surgeon: Lloyd Gutierrez MD;  Location: [facility]     HYSTERECTOMY, VAGINAL  4/18/06    Laparoscopic supracerv hyst-BSO       Social History     Tobacco Use     Smoking status: Never Smoker     Smokeless tobacco: Never Used   Substance Use Topics     Alcohol use: Yes     Comment: rare     Family History   Problem Relation Age of Onset     Hypertension Mother      Cancer Mother         pancreatic CA     Lipids Mother      Cancer Father         liver CA     Depression Father      Depression Sister      Arthritis Sister         left knee replacement     Breast Cancer Sister      Breast Cancer Sister          Current Outpatient Medications   Medication Sig Dispense Refill     ASPIRIN PO Take 81 mg by mouth daily       fish oil-omega-3 fatty acids (FISH OIL) 1000 MG capsule Take 1 capsule (1 g) by mouth 3 times daily 120 capsule 11     metoprolol succinate ER (TOPROL-XL) 200 MG 24 hr tablet Take 1 tablet (200 mg) by mouth daily 90 tablet 3     Multiple Vitamins-Minerals (MULTIVITAL PO) Take  by mouth.       pravastatin (PRAVACHOL) 20 MG tablet Take 1 tablet (20 mg) by mouth daily 90 tablet 3     venlafaxine (EFFEXOR-XR) 150 MG 24 hr capsule TAKE 1 CAPSULE EVERY DAY 90 capsule 3     Allergies   Allergen Reactions     Atorvastatin Other (See Comments)     Myalgia           Review of Systems   Constitutional: Negative for chills and fever.   HENT: Negative for congestion, ear  "pain, hearing loss and sore throat.    Eyes: Negative for pain and visual disturbance.   Respiratory: Negative for cough and shortness of breath.    Cardiovascular: Negative for chest pain, palpitations and peripheral edema.   Gastrointestinal: Positive for constipation. Negative for abdominal pain, diarrhea, heartburn, hematochezia and nausea.   Breasts:  Negative for tenderness, breast mass and discharge.   Genitourinary: Positive for frequency and urgency. Negative for dysuria, genital sores, hematuria, pelvic pain, vaginal bleeding and vaginal discharge.   Musculoskeletal: Negative for arthralgias, joint swelling and myalgias.   Skin: Negative for rash.   Neurological: Negative for dizziness, weakness, headaches and paresthesias.   Psychiatric/Behavioral: Negative for mood changes. The patient is not nervous/anxious.        OBJECTIVE:   BP (!) 160/88   Pulse 76   Temp 98.3  F (36.8  C) (Tympanic)   Resp 18   Ht 1.619 m (5' 3.75\")   Wt 89.4 kg (197 lb)   LMP 10/24/2005   SpO2 96%   BMI 34.08 kg/m   Estimated body mass index is 34.08 kg/m  as calculated from the following:    Height as of this encounter: 1.619 m (5' 3.75\").    Weight as of this encounter: 89.4 kg (197 lb).  Physical Exam  GENERAL: healthy, alert and no distress  EYES: Eyes grossly normal to inspection, PERRL and conjunctivae and sclerae normal  HENT: ear canals and TM's normal, nose and mouth without ulcers or lesions  NECK: no adenopathy, no asymmetry, masses, or scars and thyroid normal to palpation  RESP: lungs clear to auscultation - no rales, rhonchi or wheezes  CV: regular rate and rhythm, normal S1 S2, no S3 or S4, no murmur, click or rub, no peripheral edema and peripheral pulses strong  ABDOMEN: soft, nontender, no hepatosplenomegaly, no masses and bowel sounds normal  MS: no gross musculoskeletal defects noted, no edema  SKIN: no suspicious lesions or rashes  NEURO: Normal strength and tone, mentation intact and speech " normal  PSYCH: mentation appears normal, affect normal/bright    Diagnostic Test Results:  Labs reviewed in Epic  Results for orders placed or performed in visit on 08/10/20 (from the past 24 hour(s))   Lipid panel reflex to direct LDL Fasting   Result Value Ref Range    Cholesterol 206 (H) <200 mg/dL    Triglycerides PENDING <150 mg/dL    HDL Cholesterol PENDING >49 mg/dL    LDL Cholesterol Calculated PENDING <100 mg/dL    Non HDL Cholesterol PENDING <130 mg/dL   Hemoglobin A1c   Result Value Ref Range    Hemoglobin A1C 6.1 (H) 0 - 5.6 %       ASSESSMENT / PLAN:   1. Encounter for Medicare annual wellness exam      Immunizations: advised to check on coverage for Shingrix    Lab Studies: as below    Mammogram: ordered    Colonoscopy/FIT: due 2026    Advanced care planning: her chart says this was received but I don't see it scanned to the ACP tab, she will send a new copy       2. Essential hypertension, benign    Not controlled, we'll increased metoprolol.  Advised her to monitor BPs at home and if not improved to <130/80 in two weeks, schedule virtual visit to discuss adding second med.     - metoprolol succinate ER (TOPROL-XL) 200 MG 24 hr tablet; Take 1 tablet (200 mg) by mouth daily  Dispense: 90 tablet; Refill: 3  - Creatinine    3. Hyperlipidemia LDL goal <130    Lipids in process    - pravastatin (PRAVACHOL) 20 MG tablet; Take 1 tablet (20 mg) by mouth daily  Dispense: 90 tablet; Refill: 3  - Lipid panel reflex to direct LDL Fasting    4. Major depression in complete remission (H)    Well controlled, she'd like to continue current med dose    - venlafaxine (EFFEXOR-XR) 150 MG 24 hr capsule; TAKE 1 CAPSULE EVERY DAY  Dispense: 90 capsule; Refill: 3    5. Pre-diabetes    A1C remains in prediabetes range but has increased, continue to monitor and work on lifestyle management.  Recheck in 6 months.     - Hemoglobin A1c    6. Visit for screening mammogram    - MA Screen Bilateral w/Jair;  "Future    COUNSELING:  Reviewed preventive health counseling, as reflected in patient instructions    Estimated body mass index is 34.08 kg/m  as calculated from the following:    Height as of this encounter: 1.619 m (5' 3.75\").    Weight as of this encounter: 89.4 kg (197 lb).       reports that she has never smoked. She has never used smokeless tobacco.      Appropriate preventive services were discussed with this patient, including applicable screening as appropriate for cardiovascular disease, diabetes, osteopenia/osteoporosis, and glaucoma.  As appropriate for age/gender, discussed screening for colorectal cancer, prostate cancer, breast cancer, and cervical cancer. Checklist reviewing preventive services available has been given to the patient.    Reviewed patients plan of care and provided an AVS. The Basic Care Plan (routine screening as documented in Health Maintenance) for Shanelle meets the Care Plan requirement. This Care Plan has been established and reviewed with the Patient.    Cristopher Valladares MD  [facility]    Identified Health Risks:    The patient was counseled and encouraged to consider modifying their diet and eating habits. She was provided with information on recommended healthy diet options.  The patient was provided with written information regarding signs of hearing loss.  Information on urinary incontinence and treatment options given to patient.  "

## 2020-08-10 NOTE — PATIENT INSTRUCTIONS
I'd recommend checking with your insurance to see if they cover the new shingles vaccine, Shingrix.  This vaccine is much more effective than the older shingles vaccine.  Some insurances only cover this if you get it at the pharmacy rather than the clinic, so either check on this or just plan to get the vaccine at a pharmacy.       Send us a new copy of your advanced healthcare directive.     Increase metoprolol from 100mg to 200mg daily.  Check blood pressure at least 3 times per week.  If not improved to <130/80 after two week, please schedule a telephone or video visit to review.  Call if heart rate is below 60.       Patient Education   Personalized Prevention Plan  You are due for the preventive services outlined below.  Your care team is available to assist you in scheduling these services.  If you have already completed any of these items, please share that information with your care team to update in your medical record.  Health Maintenance Due   Topic Date Due     Zoster (Shingles) Vaccine (2 of 3) 11/14/2011     Depression Assessment  10/29/2019     Annual Wellness Visit  04/29/2020     Cholesterol Lab  04/29/2020     FALL RISK ASSESSMENT  04/29/2020       Understanding USDA MyPlate  The USDA (U.S. Department of Agriculture) has guidelines to help you make healthy food choices. These are called MyPlate. MyPlate shows the food groups that make up healthy meals using the image of a place setting. Before you eat, think about the healthiest choices for what to put onto your plate or into your cup or bowl. To learn more about building a healthy plate, visit www.choosemyplate.gov.    The food groups    Fruits. Any fruit or 100% fruit juice counts as part of the Fruit Group. Fruits may be fresh, canned, frozen, or dried, and may be whole, cut-up, or pureed. Make half your plate fruits and vegetables.    Vegetables. Any vegetable or 100% vegetable juice counts as a member of the Vegetable Group. Vegetables may be  fresh, frozen, canned, or dried. They can be served raw or cooked and may be whole, cut-up, or mashed. Make half your plate fruits and vegetables.    Grains. All foods made from grains are part of the Grains Group. These include wheat, rice, oats, cornmeal, and barley such as bread, pasta, oatmeal, cereal, tortillas, and grits. Grains should be no more than a quarter of your plate. At least half of your grains should be whole grains.    Protein. This group includes meat, poultry, seafood, beans and peas, eggs, processed soy products (like tofu), nuts (including nut butters), and seeds. Make protein choices no more than a quarter of your plate. Meat and poultry choices should be lean or low fat.    Dairy. All fluid milk products and foods made from milk that contain calcium, like yogurt and cheese, are part of the Dairy Group. (Foods that have little calcium, such as cream, butter, and cream cheese, are not part of the group.) Most dairy choices should be low-fat or fat-free.    Oils. These are fats that are liquid at room temperature. They include canola, corn, olive, soybean, and sunflower oil. Foods that are mainly oil include mayonnaise, certain salad dressings, and soft margarines. You should have only 5 to 7 teaspoons of oils a day. You probably already get this much from the food you eat.  Date Last Reviewed: 8/1/2017 2000-2019 [employer]. [address] [place] [address], [place] [address]. All rights reserved. This information is not intended as a substitute for professional medical care. Always follow your healthcare professional's instructions.          Signs of Hearing Loss     Hearing much better with one ear can be a sign of hearing loss.     Hearing loss is a problem shared by many people. In fact, it is one of the most common health conditions, particularly as people age. Most people over age 65 have some hearing loss, and by age 80, almost everyone does. Because hearing loss usually occurs  slowly over the years, you may not realize your hearing ability has gotten worse.  Have your hearing checked  Contact your healthcare provider if you:    Have to strain to hear normal conversation    Have to watch other people s faces very carefully to follow what they re saying    Need to ask people to repeat what they ve said    Often misunderstand what people are saying    Turn the volume of the television or radio up so high that others complain    Feel that people are mumbling when they re talking to you    Find that the effort to hear leaves you feeling tired and irritated    Notice, when using the phone, that you hear better with one ear than the other  Date Last Reviewed: 12/1/2016 2000-2019 [employer]. [address] [place] [address], [place] [address]. All rights reserved. This information is not intended as a substitute for professional medical care. Always follow your healthcare professional's instructions.          Urinary Incontinence, Female (Adult)  Urinary incontinence means loss of control of the bladder. This problem affects many women, especially as they get older. If you have incontinence, you may be embarrassed to ask for help. But know that this problem can be treated.  Types of Incontinence  There are different types of incontinence. Two of the main types are described here. You can have more than one type.    Stress incontinence. With this type, urine leaks when pressure (stress) is put on the bladder. This may happen when you cough, sneeze, or laugh. Stress incontinence most often occurs because the pelvic floor muscles that support the bladder and urethra are weak. This can happen after pregnancy and vaginal childbirth or a hysterectomy. It can also be due to excess body weight or hormone changes.    Urge incontinence (also called overactive bladder). With this type, a sudden urge to urinate is felt often. This may happen even though there may not be much urine in the bladder. The  need to urinate often during the night is common. Urge incontinence most often occurs because of bladder spasms. This may be due to bladder irritation or infection. Damage to bladder nerves or pelvic muscles, constipation, and certain medicines can also lead to urge incontinence.  Treatment of urinary incontinence depends on the cause. Further evaluation is needed to find the type you have. This will likely include an exam and certain tests. Based on the results, you and your healthcare provider can then plan treatment. Until a diagnosis is made, the home care tips below can help relieve symptoms.  Home care    Do pelvic floor muscle exercises, if they are prescribed. The pelvic floor muscles help support the bladder and urethra. Many women find that their symptoms improve when doing special exercises that strengthen these muscles. To do the exercises contract the muscles you would use to stop your stream of urine, but do this when you re not urinating. Hold for 10 seconds, then relax. Repeat 10 to 20 times in a row, at least 3 times a day. Your provider may give you other instructions for how to do the exercises and how often.    Keep a bladder diary. This helps track how often and how much you urinate over a set period of time. Bring this diary with you to your next visit with the provider. The information can help your provider learn more about your bladder problem.    Lose weight, if advised to by your provider. Excess weight puts pressure on the bladder. Your provider can help you create a weight-loss plan that s right for you. This may include exercising more and making certain diet changes.    Don't consume foods and drinks that may irritate the bladder. These can include alcohol and caffeinated drinks.    Quit smoking. Smoking and other tobacco use can lead to chronic cough that strains the pelvic floor muscles. Smoking may also damage the bladder and urethra. Talk with your provider about treatments or  methods you can use to quit smoking.    If drinking large amounts of fluid causes you to have symptoms, you may be advised to limit your fluid intake. You may also be advised to drink most of your fluids during the day and to limit fluids at night.    If you re worried about urine leakage or accidents, you may wear absorbent pads to catch urine. Change the pads often. This helps reduce discomfort. It may also reduce the risk of skin or bladder infections.  Follow-up care  Follow up with your healthcare provider, or as directed. It may take some to find the right treatment for your problem. Your treatment plan may include special therapies or medicines. Certain procedures or surgery may also be options. Be sure to discuss any questions you have with your provider.  When to seek medical advice  Call the healthcare provider right away if any of these occur:    Fever of 100.4 F (38 C) or higher, or as directed by your provider    Bladder pain or fullness    Abdominal swelling    Nausea or vomiting    Back pain    Weakness, dizziness or fainting  Date Last Reviewed: 10/1/2017    9954-3601 [employer]. [address] [place] [address], [place] [address]. All rights reserved. This information is not intended as a substitute for professional medical care. Always follow your healthcare professional's instructions.

## 2020-09-28 ENCOUNTER — ALLIED HEALTH/NURSE VISIT (OUTPATIENT)
Dept: FAMILY MEDICINE | Facility: CLINIC | Age: 70
End: 2020-09-28
Payer: COMMERCIAL

## 2020-09-28 DIAGNOSIS — Z23 NEED FOR PROPHYLACTIC VACCINATION AND INOCULATION AGAINST INFLUENZA: Primary | ICD-10-CM

## 2020-09-28 PROCEDURE — 99207 ZZC NO CHARGE NURSE ONLY: CPT

## 2020-09-28 PROCEDURE — G0008 ADMIN INFLUENZA VIRUS VAC: HCPCS

## 2020-09-28 PROCEDURE — 90662 IIV NO PRSV INCREASED AG IM: CPT

## 2020-11-09 ENCOUNTER — VIRTUAL VISIT (OUTPATIENT)
Dept: FAMILY MEDICINE | Facility: CLINIC | Age: 70
End: 2020-11-09
Payer: COMMERCIAL

## 2020-11-09 DIAGNOSIS — Z20.822 EXPOSURE TO COVID-19 VIRUS: ICD-10-CM

## 2020-11-09 DIAGNOSIS — Z20.822 ENCOUNTER FOR LABORATORY TESTING FOR COVID-19 VIRUS: Primary | ICD-10-CM

## 2020-11-09 PROCEDURE — 99441 PR PHYSICIAN TELEPHONE EVALUATION 5-10 MIN: CPT | Mod: 95 | Performed by: NURSE PRACTITIONER

## 2020-11-09 RX ORDER — ACETAMINOPHEN 160 MG
TABLET,DISINTEGRATING ORAL
COMMUNITY

## 2020-11-09 NOTE — PROGRESS NOTES
"Shanelle Sepulveda is a 69 year old female who is being evaluated via a billable telephone visit.      The patient has been notified of following:     \"This telephone visit will be conducted via a call between you and your physician/provider. We have found that certain health care needs can be provided without the need for a physical exam.  This service lets us provide the care you need with a short phone conversation.  If a prescription is necessary we can send it directly to your pharmacy.  If lab work is needed we can place an order for that and you can then stop by our lab to have the test done at a later time.    Telephone visits are billed at different rates depending on your insurance coverage. During this emergency period, for some insurers they may be billed the same as an in-person visit.  Please reach out to your insurance provider with any questions.    If during the course of the call the physician/provider feels a telephone visit is not appropriate, you will not be charged for this service.\"    Patient has given verbal consent for Telephone visit?  Yes    What phone number would you like to be contacted at? [phone]    How would you like to obtain your AVS? MyChart    Subjective     Shanelle Sepulveda is a 69 year old female who presents via phone visit today for the following health issues:    HPI     Requesting COVID testing due to an exposure. No symptoms.    Jeanine Mcgarry CMA      Additional provider notes: COVID exposure to friend/family on Tuesday (found out Sunday he tested positive). Denies symptoms.    Review of Systems   All other systems reviewed and are negative.            Objective          Vitals:  No vitals were obtained today due to virtual visit.    healthy, alert, no distress and cooperative  PSYCH: Alert and oriented times 3; coherent speech, normal   rate and volume, able to articulate logical thoughts, able   to abstract reason, no tangential thoughts, no hallucinations " "  or delusions  Her affect is normal and pleasant  RESP: No cough, no audible wheezing, able to talk in full sentences  Remainder of exam unable to be completed due to telephone visits            Assessment/Plan:    Assessment & Plan     Encounter for laboratory testing for COVID-19 virus  Exposure consistent with [facility] testing guidelines. Discussed quarantining recommendations. Discussed process for scheduling COVID testing. Recommended ER visit if symptoms worsen and/or can't be managed at home.     - Asymptomatic COVID-19 Virus (Coronavirus) by PCR; Future    Exposure to COVID-19 virus  Exposure 6 days ago.     - Asymptomatic COVID-19 Virus (Coronavirus) by PCR; Future     BMI:   Estimated body mass index is 34.08 kg/m  as calculated from the following:    Height as of 8/10/20: 1.619 m (5' 3.75\").    Weight as of 8/10/20: 89.4 kg (197 lb).            Patient Instructions   COVID testing ordered today. You will receive a call from a blocked number to schedule that appointment.     Please quarantine until you receive your results.     If the results are negative, you need to be fever free for 24 hours before ending quarantine.     If your results are negative, but you have had exposure with a COVID positive person, you need to quarantine for 14 days from the date of your last exposure to that person.     If results are positive, you need to quarantine for 10 days from start of symptoms AND you need to be fever free (without the use of fever reducing medications) for 24 hours before ending quarantine.     If results are positive, asymptomatic household contacts will need to quarantine for 14 days.     If you develop worsening symptoms or difficulty breathing, please go to ER.        Return if symptoms worsen or fail to improve.    RICK Ji [facility]    Phone call duration:  7 minutes              "

## 2020-12-31 ENCOUNTER — HOSPITAL ENCOUNTER (OUTPATIENT)
Dept: MAMMOGRAPHY | Facility: CLINIC | Age: 70
Discharge: HOME | End: 2020-12-31
Attending: INTERNAL MEDICINE | Admitting: INTERNAL MEDICINE
Payer: COMMERCIAL

## 2020-12-31 DIAGNOSIS — Z12.31 VISIT FOR SCREENING MAMMOGRAM: ICD-10-CM

## 2020-12-31 PROCEDURE — 77063 BREAST TOMOSYNTHESIS BI: CPT

## 2021-03-03 ENCOUNTER — IMMUNIZATION (OUTPATIENT)
Dept: NURSING | Facility: CLINIC | Age: 71
End: 2021-03-03
Payer: COMMERCIAL

## 2021-03-03 PROCEDURE — 0011A PR COVID VAC MODERNA 100 MCG/0.5 ML IM: CPT

## 2021-03-03 PROCEDURE — 91301 PR COVID VAC MODERNA 100 MCG/0.5 ML IM: CPT

## 2021-03-31 ENCOUNTER — IMMUNIZATION (OUTPATIENT)
Dept: NURSING | Facility: CLINIC | Age: 71
End: 2021-03-31
Attending: INTERNAL MEDICINE
Payer: COMMERCIAL

## 2021-03-31 PROCEDURE — 0012A PR COVID VAC MODERNA 100 MCG/0.5 ML IM: CPT

## 2021-03-31 PROCEDURE — 91301 PR COVID VAC MODERNA 100 MCG/0.5 ML IM: CPT

## 2021-08-16 ENCOUNTER — OFFICE VISIT (OUTPATIENT)
Dept: INTERNAL MEDICINE | Facility: CLINIC | Age: 71
End: 2021-08-16
Payer: COMMERCIAL

## 2021-08-16 VITALS
WEIGHT: 202 LBS | OXYGEN SATURATION: 100 % | BODY MASS INDEX: 34.49 KG/M2 | RESPIRATION RATE: 16 BRPM | HEART RATE: 75 BPM | HEIGHT: 64 IN | DIASTOLIC BLOOD PRESSURE: 90 MMHG | SYSTOLIC BLOOD PRESSURE: 160 MMHG | TEMPERATURE: 98.1 F

## 2021-08-16 DIAGNOSIS — R73.09 ELEVATED GLUCOSE: ICD-10-CM

## 2021-08-16 DIAGNOSIS — E78.5 HYPERLIPIDEMIA LDL GOAL <130: ICD-10-CM

## 2021-08-16 DIAGNOSIS — E66.01 MORBID OBESITY (H): ICD-10-CM

## 2021-08-16 DIAGNOSIS — G89.29 CHRONIC RIGHT SHOULDER PAIN: ICD-10-CM

## 2021-08-16 DIAGNOSIS — Z00.00 PREVENTATIVE HEALTH CARE: Primary | ICD-10-CM

## 2021-08-16 DIAGNOSIS — M25.511 CHRONIC RIGHT SHOULDER PAIN: ICD-10-CM

## 2021-08-16 DIAGNOSIS — F32.5 MAJOR DEPRESSION IN COMPLETE REMISSION (H): ICD-10-CM

## 2021-08-16 DIAGNOSIS — I10 ESSENTIAL HYPERTENSION, BENIGN: ICD-10-CM

## 2021-08-16 LAB
BASOPHILS # BLD AUTO: 0 10E3/UL (ref 0–0.2)
BASOPHILS NFR BLD AUTO: 0 %
EOSINOPHIL # BLD AUTO: 0 10E3/UL (ref 0–0.7)
EOSINOPHIL NFR BLD AUTO: 0 %
ERYTHROCYTE [DISTWIDTH] IN BLOOD BY AUTOMATED COUNT: 12.8 % (ref 10–15)
HBA1C MFR BLD: 5.8 % (ref 0–5.6)
HCT VFR BLD AUTO: 43 % (ref 35–47)
HGB BLD-MCNC: 13.9 G/DL (ref 11.7–15.7)
LYMPHOCYTES # BLD AUTO: 1.3 10E3/UL (ref 0.8–5.3)
LYMPHOCYTES NFR BLD AUTO: 33 %
MCH RBC QN AUTO: 31.3 PG (ref 26.5–33)
MCHC RBC AUTO-ENTMCNC: 32.3 G/DL (ref 31.5–36.5)
MCV RBC AUTO: 97 FL (ref 78–100)
MONOCYTES # BLD AUTO: 0.5 10E3/UL (ref 0–1.3)
MONOCYTES NFR BLD AUTO: 11 %
NEUTROPHILS # BLD AUTO: 2.2 10E3/UL (ref 1.6–8.3)
NEUTROPHILS NFR BLD AUTO: 55 %
PLATELET # BLD AUTO: 236 10E3/UL (ref 150–450)
RBC # BLD AUTO: 4.44 10E6/UL (ref 3.8–5.2)
WBC # BLD AUTO: 4 10E3/UL (ref 4–11)

## 2021-08-16 PROCEDURE — 84443 ASSAY THYROID STIM HORMONE: CPT | Performed by: INTERNAL MEDICINE

## 2021-08-16 PROCEDURE — 85025 COMPLETE CBC W/AUTO DIFF WBC: CPT | Performed by: INTERNAL MEDICINE

## 2021-08-16 PROCEDURE — 80061 LIPID PANEL: CPT | Performed by: INTERNAL MEDICINE

## 2021-08-16 PROCEDURE — 83036 HEMOGLOBIN GLYCOSYLATED A1C: CPT | Performed by: INTERNAL MEDICINE

## 2021-08-16 PROCEDURE — 99214 OFFICE O/P EST MOD 30 MIN: CPT | Mod: 25 | Performed by: INTERNAL MEDICINE

## 2021-08-16 PROCEDURE — 80053 COMPREHEN METABOLIC PANEL: CPT | Performed by: INTERNAL MEDICINE

## 2021-08-16 PROCEDURE — 99397 PER PM REEVAL EST PAT 65+ YR: CPT | Performed by: INTERNAL MEDICINE

## 2021-08-16 PROCEDURE — 36415 COLL VENOUS BLD VENIPUNCTURE: CPT | Performed by: INTERNAL MEDICINE

## 2021-08-16 RX ORDER — TRIAMTERENE/HYDROCHLOROTHIAZID 37.5-25 MG
1 TABLET ORAL DAILY
Qty: 90 TABLET | Refills: 1 | Status: SHIPPED | OUTPATIENT
Start: 2021-08-16 | End: 2022-01-18 | Stop reason: SINTOL

## 2021-08-16 RX ORDER — METOPROLOL SUCCINATE 200 MG/1
200 TABLET, EXTENDED RELEASE ORAL DAILY
Qty: 90 TABLET | Refills: 3 | Status: SHIPPED | OUTPATIENT
Start: 2021-08-16 | End: 2022-08-25

## 2021-08-16 RX ORDER — VENLAFAXINE HYDROCHLORIDE 150 MG/1
CAPSULE, EXTENDED RELEASE ORAL
Qty: 90 CAPSULE | Refills: 3 | Status: SHIPPED | OUTPATIENT
Start: 2021-08-16 | End: 2022-08-25

## 2021-08-16 RX ORDER — PRAVASTATIN SODIUM 20 MG
20 TABLET ORAL DAILY
Qty: 90 TABLET | Refills: 3 | Status: SHIPPED | OUTPATIENT
Start: 2021-08-16 | End: 2022-08-25

## 2021-08-16 ASSESSMENT — MIFFLIN-ST. JEOR: SCORE: 1413.33

## 2021-08-16 ASSESSMENT — ENCOUNTER SYMPTOMS
HEARTBURN: 0
DIZZINESS: 1
FREQUENCY: 1
NERVOUS/ANXIOUS: 0
CHILLS: 0
PARESTHESIAS: 0
NAUSEA: 0
DYSURIA: 0
BREAST MASS: 0
ARTHRALGIAS: 0
PALPITATIONS: 0
FEVER: 0
MYALGIAS: 1
DIARRHEA: 0
ABDOMINAL PAIN: 0
HEADACHES: 1
COUGH: 0
SHORTNESS OF BREATH: 0
EYE PAIN: 0
HEMATURIA: 0
JOINT SWELLING: 0
HEMATOCHEZIA: 0
CONSTIPATION: 0
WEAKNESS: 0
SORE THROAT: 0

## 2021-08-16 ASSESSMENT — ACTIVITIES OF DAILY LIVING (ADL): CURRENT_FUNCTION: NO ASSISTANCE NEEDED

## 2021-08-16 NOTE — PROGRESS NOTES
"SUBJECTIVE:   Shanelle Sepulveda is a 70 year old female who presents for Preventive Visit.      Patient has been advised of split billing requirements and indicates understanding: Yes   Are you in the first 12 months of your Medicare coverage?  No    Healthy Habits:     In general, how would you rate your overall health?  Good    Frequency of exercise:  1 day/week    Duration of exercise:  15-30 minutes    Do you usually eat at least 4 servings of fruit and vegetables a day, include whole grains    & fiber and avoid regularly eating high fat or \"junk\" foods?  No    Taking medications regularly:  Yes    Ability to successfully perform activities of daily living:  No assistance needed    Home Safety:  No safety concerns identified    Hearing Impairment:  No hearing concerns    In the past 6 months, have you been bothered by leaking of urine? Yes    In general, how would you rate your overall mental or emotional health?  Excellent      PHQ-2 Total Score: 0    Additional concerns today:  Yes    Do you feel safe in your environment? Yes    Have you ever done Advance Care Planning? (For example, a Health Directive, POLST, or a discussion with a medical provider or your loved ones about your wishes): No, advance care planning information given to patient to review.  Patient declined advance care planning discussion at this time.       Fall risk  Fallen 2 or more times in the past year?: No  Any fall with injury in the past year?: No    Cognitive Screening   1) Repeat 3 items (Leader, Season, Table)    2) Clock draw:   3) 3 item recall: Recalls 3 objects  Results: 3 items recalled: COGNITIVE IMPAIRMENT LESS LIKELY    Mini-CogTM Copyright VJ Campbell. Licensed by the author for use in [facility]; reprinted with permission (amy@.[email or web address]). All rights reserved.      Do you have sleep apnea, excessive snoring or daytime drowsiness?: no     Reviewed and updated as needed this visit by clinical staff  Tobacco  Allergies "  Meds   Med Hx  Surg Hx  Fam Hx  Soc Hx        Reviewed and updated as needed this visit by Provider                Social History     Tobacco Use     Smoking status: Never Smoker     Smokeless tobacco: Never Used   Substance Use Topics     Alcohol use: Yes     Comment: rare         Alcohol Use 8/16/2021   Prescreen: >3 drinks/day or >7 drinks/week? No   Prescreen: >3 drinks/day or >7 drinks/week? -               Current providers sharing in care for this patient include:   Patient Care Team:  Anna Valerio MD as PCP - General (Internal Medicine)  Cristopher Valladares MD as Assigned PCP    The following health maintenance items are reviewed in Epic and correct as of today:  Health Maintenance Due   Topic Date Due     ANNUAL REVIEW OF HM ORDERS  Never done     ZOSTER IMMUNIZATION (2 of 3) 11/14/2011     PHQ-9  02/10/2021     LIPID  08/10/2021     FALL RISK ASSESSMENT  08/10/2021     BP Readings from Last 3 Encounters:   08/16/21 (!) 160/90   08/10/20 (!) 152/92   04/29/19 126/84    Wt Readings from Last 3 Encounters:   08/16/21 91.6 kg (202 lb)   08/10/20 89.4 kg (197 lb)   04/29/19 88.5 kg (195 lb 3.2 oz)                    Review of Systems  CONSTITUTIONAL: NEGATIVE for fever, chills, change in weight  INTEGUMENTARY/SKIN: NEGATIVE for worrisome rashes, moles or lesions  EYES: NEGATIVE for vision changes or irritation  ENT/MOUTH: NEGATIVE for ear, mouth and throat problems  RESP: NEGATIVE for significant cough or SOB  BREAST: NEGATIVE for masses, tenderness or discharge  CV: NEGATIVE for chest pain, palpitations or peripheral edema  GI: NEGATIVE for nausea, abdominal pain, heartburn, or change in bowel habits  : NEGATIVE for frequency, dysuria, or hematuria  MUSCULOSKELETAL:right shoulder pain at night intermittent   NEURO: NEGATIVE for weakness, dizziness or paresthesias  ENDOCRINE: NEGATIVE for temperature intolerance, skin/hair changes  HEME: NEGATIVE for bleeding problems  PSYCHIATRIC: NEGATIVE for  "changes in mood or affect    OBJECTIVE:   BP (!) 160/90   Pulse 75   Temp 98.1  F (36.7  C) (Oral)   Resp 16   Ht 1.613 m (5' 3.5\")   Wt 91.6 kg (202 lb)   LMP 10/24/2005   SpO2 100%   BMI 35.22 kg/m   Estimated body mass index is 35.22 kg/m  as calculated from the following:    Height as of this encounter: 1.613 m (5' 3.5\").    Weight as of this encounter: 91.6 kg (202 lb).  Physical Exam  GENERAL: healthy, alert and no distress  EYES: Eyes grossly normal to inspection, PERRL and conjunctivae and sclerae normal  HENT: ear canals and TM's normal, nose and mouth without ulcers or lesions  NECK: no adenopathy, no asymmetry, masses, or scars and thyroid normal to palpation  RESP: lungs clear to auscultation - no rales, rhonchi or wheezes  CV: regular rate and rhythm, normal S1 S2, no S3 or S4, no murmur, click or rub, no peripheral edema and peripheral pulses strong  ABDOMEN: soft, nontender, no hepatosplenomegaly, no masses and bowel sounds normal  MS: no gross musculoskeletal defects noted, no edema  SKIN: no suspicious lesions or rashes  NEURO: Normal strength and tone, mentation intact and speech normal  PSYCH: mentation appears normal, affect normal/bright      ASSESSMENT / PLAN:   1. Preventative health care       2. Essential hypertension, benign  under inadequate control Will add Maxzide 37.5/25 1 po qd. Will need to follow up in 1 month for recheck of blood pressure and labwork. She is to watch for muscle cramps, severe lightheaded or weakness.   - metoprolol succinate ER (TOPROL-XL) 200 MG 24 hr tablet; Take 1 tablet (200 mg) by mouth daily  Dispense: 90 tablet; Refill: 3  - CBC with platelets and differential; Future  - TSH with free T4 reflex; Future  - Lipid Profile (Chol, Trig, HDL, LDL calc); Future  - Hemoglobin A1c; Future  - Comprehensive metabolic panel (BMP + Alb, Alk Phos, ALT, AST, Total. Bili, TP); Future  - triamterene-HCTZ (MAXZIDE-25) 37.5-25 MG tablet; Take 1 tablet by mouth daily  " "Dispense: 90 tablet; Refill: 1  - CBC with platelets and differential  - TSH with free T4 reflex  - Lipid Profile (Chol, Trig, HDL, LDL calc)  - Hemoglobin A1c  - Comprehensive metabolic panel (BMP + Alb, Alk Phos, ALT, AST, Total. Bili, TP)    3. Chronic right shoulder pain  Offered to start PT but she declined.     4. Hyperlipidemia LDL goal <130     - pravastatin (PRAVACHOL) 20 MG tablet; Take 1 tablet (20 mg) by mouth daily  Dispense: 90 tablet; Refill: 3    5. Major depression in complete remission (H)     - venlafaxine (EFFEXOR-XR) 150 MG 24 hr capsule; TAKE 1 CAPSULE EVERY DAY  Dispense: 90 capsule; Refill: 3    6. Elevated glucose     - Hemoglobin A1c; Future  - Hemoglobin A1c    7. Morbid obesity (H)         Patient has been advised of split billing requirements and indicates understanding: Yes  COUNSELING:  Reviewed preventive health counseling, as reflected in patient instructions       Regular exercise       Healthy diet/nutrition    Estimated body mass index is 35.22 kg/m  as calculated from the following:    Height as of this encounter: 1.613 m (5' 3.5\").    Weight as of this encounter: 91.6 kg (202 lb).    Weight management plan: Discussed healthy diet and exercise guidelines    She reports that she has never smoked. She has never used smokeless tobacco.      Appropriate preventive services were discussed with this patient, including applicable screening as appropriate for cardiovascular disease, diabetes, osteopenia/osteoporosis, and glaucoma.  As appropriate for age/gender, discussed screening for colorectal cancer, prostate cancer, breast cancer, and cervical cancer. Checklist reviewing preventive services available has been given to the patient.    Reviewed patients plan of care and provided an AVS. The Basic Care Plan (routine screening as documented in Health Maintenance) for Shanelle meets the Care Plan requirement. This Care Plan has been established and reviewed with the " Patient.    Counseling Resources:  ATP IV Guidelines  Pooled Cohorts Equation Calculator  Breast Cancer Risk Calculator  Breast Cancer: Medication to Reduce Risk  FRAX Risk Assessment  ICSI Preventive Guidelines  Dietary Guidelines for Americans, 2010  USDA's MyPlate  ASA Prophylaxis  Lung CA Screening    Anna Valerio MD  [facility]    Identified Health Risks:

## 2021-08-17 ENCOUNTER — TELEPHONE (OUTPATIENT)
Dept: INTERNAL MEDICINE | Facility: CLINIC | Age: 71
End: 2021-08-17

## 2021-08-17 LAB
ALBUMIN SERPL-MCNC: 4.1 G/DL (ref 3.4–5)
ALP SERPL-CCNC: 65 U/L (ref 40–150)
ALT SERPL W P-5'-P-CCNC: 63 U/L (ref 0–50)
ANION GAP SERPL CALCULATED.3IONS-SCNC: 5 MMOL/L (ref 3–14)
AST SERPL W P-5'-P-CCNC: 35 U/L (ref 0–45)
BILIRUB SERPL-MCNC: 0.4 MG/DL (ref 0.2–1.3)
BUN SERPL-MCNC: 18 MG/DL (ref 7–30)
CALCIUM SERPL-MCNC: 9.3 MG/DL (ref 8.5–10.1)
CHLORIDE BLD-SCNC: 108 MMOL/L (ref 94–109)
CHOLEST SERPL-MCNC: 218 MG/DL
CO2 SERPL-SCNC: 29 MMOL/L (ref 20–32)
CREAT SERPL-MCNC: 0.81 MG/DL (ref 0.52–1.04)
FASTING STATUS PATIENT QL REPORTED: YES
GFR SERPL CREATININE-BSD FRML MDRD: 74 ML/MIN/1.73M2
GLUCOSE BLD-MCNC: 110 MG/DL (ref 70–99)
HDLC SERPL-MCNC: 54 MG/DL
LDLC SERPL CALC-MCNC: 128 MG/DL
NONHDLC SERPL-MCNC: 164 MG/DL
POTASSIUM BLD-SCNC: 4 MMOL/L (ref 3.4–5.3)
PROT SERPL-MCNC: 7.6 G/DL (ref 6.8–8.8)
SODIUM SERPL-SCNC: 142 MMOL/L (ref 133–144)
TRIGL SERPL-MCNC: 182 MG/DL
TSH SERPL DL<=0.005 MIU/L-ACNC: 0.79 MU/L (ref 0.4–4)

## 2021-08-17 NOTE — TELEPHONE ENCOUNTER
Per Pharmacy:  RE:  Pravastatin Tabs 20 MG    Patient is allergic to Atorvastatin Calcium.  Reported on 8/16/2021.  This drug is in allergy group. Please clarify.    Phone:  [phone]  Case Item:  [id number]  RX:  [phone]  Location:  12  Member #:  [id number]

## 2021-09-12 ENCOUNTER — HEALTH MAINTENANCE LETTER (OUTPATIENT)
Age: 71
End: 2021-09-12

## 2021-09-28 ENCOUNTER — VIRTUAL VISIT (OUTPATIENT)
Dept: INTERNAL MEDICINE | Facility: CLINIC | Age: 71
End: 2021-09-28
Payer: COMMERCIAL

## 2021-09-28 ENCOUNTER — MYC MEDICAL ADVICE (OUTPATIENT)
Dept: INTERNAL MEDICINE | Facility: CLINIC | Age: 71
End: 2021-09-28

## 2021-09-28 VITALS — SYSTOLIC BLOOD PRESSURE: 136 MMHG | DIASTOLIC BLOOD PRESSURE: 78 MMHG

## 2021-09-28 DIAGNOSIS — I10 ESSENTIAL HYPERTENSION, BENIGN: Primary | ICD-10-CM

## 2021-09-28 PROCEDURE — 99441 PR PHYSICIAN TELEPHONE EVALUATION 5-10 MIN: CPT | Mod: 95 | Performed by: INTERNAL MEDICINE

## 2021-09-28 ASSESSMENT — PATIENT HEALTH QUESTIONNAIRE - PHQ9
10. IF YOU CHECKED OFF ANY PROBLEMS, HOW DIFFICULT HAVE THESE PROBLEMS MADE IT FOR YOU TO DO YOUR WORK, TAKE CARE OF THINGS AT HOME, OR GET ALONG WITH OTHER PEOPLE: NOT DIFFICULT AT ALL
SUM OF ALL RESPONSES TO PHQ QUESTIONS 1-9: 2
SUM OF ALL RESPONSES TO PHQ QUESTIONS 1-9: 2

## 2021-09-28 ASSESSMENT — ANXIETY QUESTIONNAIRES
1. FEELING NERVOUS, ANXIOUS, OR ON EDGE: NOT AT ALL
6. BECOMING EASILY ANNOYED OR IRRITABLE: NOT AT ALL
GAD7 TOTAL SCORE: 0
GAD7 TOTAL SCORE: 0
7. FEELING AFRAID AS IF SOMETHING AWFUL MIGHT HAPPEN: NOT AT ALL
5. BEING SO RESTLESS THAT IT IS HARD TO SIT STILL: NOT AT ALL
3. WORRYING TOO MUCH ABOUT DIFFERENT THINGS: NOT AT ALL
4. TROUBLE RELAXING: NOT AT ALL
7. FEELING AFRAID AS IF SOMETHING AWFUL MIGHT HAPPEN: NOT AT ALL
2. NOT BEING ABLE TO STOP OR CONTROL WORRYING: NOT AT ALL
GAD7 TOTAL SCORE: 0

## 2021-09-28 NOTE — PROGRESS NOTES
Mckenzie is a 70 year old who is being evaluated via a billable telephone visit.      What phone number would you like to be contacted at? [phone]  How would you like to obtain your AVS? MyChart    Assessment & Plan     Essential hypertension, benign  under reasonable control Continue current medications. Follow up in 6 months         Return in about 6 months (around 3/28/2022).    Anna Valerio MD  [facility]    Ponce Rincon is a 70 year old who presents for the following health issues     HPI     Labs done 8-.    Hyperlipidemia Follow-Up      Are you regularly taking any medication or supplement to lower your cholesterol?   Yes- pravastatin    Are you having muscle aches or other side effects that you think could be caused by your cholesterol lowering medication?  No    Hypertension Follow-up      Do you check your blood pressure regularly outside of the clinic? No     Are you following a low salt diet? Yes    Are your blood pressures ever more than 140 on the top number (systolic) OR more   than 90 on the bottom number (diastolic), for example 140/90? No      How many servings of fruits and vegetables do you eat daily?  2-3    On average, how many sweetened beverages do you drink each day (Examples: soda, juice, sweet tea, etc.  Do NOT count diet or artificially sweetened beverages)?   0    How many days per week do you exercise enough to make your heart beat faster? 3 or less    How many minutes a day do you exercise enough to make your heart beat faster? 9 or less    How many days per week do you miss taking your medication? 0    She has been checking her blood pressure the last couple days and running 130-138/75 range bp today is 136/78. She has no side effects on the Maxzide and specifically no muscle cramps.     Review of Systems   Constitutional, HEENT, cardiovascular, pulmonary, GI, , musculoskeletal, neuro, skin, endocrine and psych systems are negative,  except as otherwise noted.      Objective           Vitals:  /78    Physical Exam   healthy, alert and no distress  PSYCH: Alert and oriented times 3; coherent speech, normal   rate and volume, able to articulate logical thoughts, able   to abstract reason, no tangential thoughts, no hallucinations   or delusions  Her affect is normal  RESP: No cough, no audible wheezing, able to talk in full sentences  Remainder of exam unable to be completed due to telephone visits          Phone call duration: 6 minutes

## 2021-09-29 ASSESSMENT — ANXIETY QUESTIONNAIRES: GAD7 TOTAL SCORE: 0

## 2021-09-29 ASSESSMENT — PATIENT HEALTH QUESTIONNAIRE - PHQ9: SUM OF ALL RESPONSES TO PHQ QUESTIONS 1-9: 2

## 2021-12-11 ENCOUNTER — MYC MEDICAL ADVICE (OUTPATIENT)
Dept: INTERNAL MEDICINE | Facility: CLINIC | Age: 71
End: 2021-12-11
Payer: COMMERCIAL

## 2021-12-26 ENCOUNTER — OFFICE VISIT (OUTPATIENT)
Dept: URGENT CARE | Facility: URGENT CARE | Age: 71
End: 2021-12-26
Payer: COMMERCIAL

## 2021-12-26 VITALS
BODY MASS INDEX: 33.13 KG/M2 | OXYGEN SATURATION: 97 % | SYSTOLIC BLOOD PRESSURE: 130 MMHG | DIASTOLIC BLOOD PRESSURE: 70 MMHG | HEART RATE: 75 BPM | TEMPERATURE: 98.5 F | WEIGHT: 190 LBS

## 2021-12-26 DIAGNOSIS — M79.675 GREAT TOE PAIN, LEFT: Primary | ICD-10-CM

## 2021-12-26 LAB
ERYTHROCYTE [DISTWIDTH] IN BLOOD BY AUTOMATED COUNT: 13.2 % (ref 10–15)
HCT VFR BLD AUTO: 40.9 % (ref 35–47)
HGB BLD-MCNC: 13.1 G/DL (ref 11.7–15.7)
MCH RBC QN AUTO: 31.6 PG (ref 26.5–33)
MCHC RBC AUTO-ENTMCNC: 32 G/DL (ref 31.5–36.5)
MCV RBC AUTO: 99 FL (ref 78–100)
PLATELET # BLD AUTO: 263 10E3/UL (ref 150–450)
RBC # BLD AUTO: 4.15 10E6/UL (ref 3.8–5.2)
URATE SERPL-MCNC: 8.7 MG/DL (ref 2.6–6)
WBC # BLD AUTO: 7.4 10E3/UL (ref 4–11)

## 2021-12-26 PROCEDURE — 85027 COMPLETE CBC AUTOMATED: CPT | Performed by: PHYSICIAN ASSISTANT

## 2021-12-26 PROCEDURE — 99213 OFFICE O/P EST LOW 20 MIN: CPT | Performed by: PHYSICIAN ASSISTANT

## 2021-12-26 PROCEDURE — 36415 COLL VENOUS BLD VENIPUNCTURE: CPT | Performed by: PHYSICIAN ASSISTANT

## 2021-12-26 PROCEDURE — 84550 ASSAY OF BLOOD/URIC ACID: CPT | Performed by: PHYSICIAN ASSISTANT

## 2021-12-26 RX ORDER — INDOMETHACIN 50 MG/1
50 CAPSULE ORAL 2 TIMES DAILY WITH MEALS
Qty: 10 CAPSULE | Refills: 0 | Status: SHIPPED | OUTPATIENT
Start: 2021-12-26 | End: 2022-01-18

## 2021-12-26 NOTE — PATIENT INSTRUCTIONS
"  Patient Education     What Is Gout?  Gout is a disease that affects the joints. Left untreated, it can lead to painful foot and joint deformities and even kidney problems. But, by treating gout early, you can relieve pain and help prevent future problems. Gout can usually be treated with medicine and proper diet. In severe cases, surgery may be needed.  What causes gout?  Gout is caused by an excess of uric acid (a waste product made by the body). Uric acid is excreted by the kidneys. If the uric acid level in your blood rises too high, the uric acid may form crystals that collect in the joints, bringing on a gout attack. If you have many gout attacks, crystals may form large deposits called tophi. Tophi can damage joints and cause deformity.  Who is at risk for gout?  Men are more likely to have gout than women. But women can also be affected, mostly after menopause. Some health problems, such as obesity and high cholesterol, make gout more likely. And some medicines, such as diuretics ( water pills ), can trigger a gout attack. People who drink a lot of alcohol are at high risk for gout. Certain foods can also trigger a gout attack.  Substances that may trigger a gout attack  To help prevent a gout attack, avoid these foods:    Alcohol (particularly beer, but also red wine and spirits)    Certain meats (red meat, processed meat, turkey)    Organ meats (kidney, liver, sweetbread)    Shellfish (lobster, crab, shrimp, scallop, mussel)    Certain fish (anchovy, sardine, herring, mackerel)  Treatment    Lifestyle changes, including weight loss, exercise, and quitting tobacco use    Reducing consumption of the food groups above as well as high fructose corn syrup, found in many foods including sodas and energy drinks    Changing non-essential medicines that may contribute to gout (such as thiazide diuretics--\"water pills\")    Medicines to reduce the amount of uric acid in the blood, such as allopurinol, probencid, " febuxostat, and lesinurad.    Medicines to treat acute gout attacks, including NSAIDs (nonsteroidal anti-inflammatory medicines), steroids, and colchicine    [employer] last reviewed this educational content on 4/1/2018 2000-2021 [employer]. All rights reserved. This information is not intended as a substitute for professional medical care. Always follow your healthcare professional's instructions.

## 2021-12-26 NOTE — PROGRESS NOTES
Assessment & Plan     Great toe pain, left  DDx includes gout, pseudo gout, inflammation, arthritis etc...   CBC done today is wnl. Uric acid is pending.   Trial of indomethacin. Keep monitoring symptoms.  Follow-up if any worsening symptoms.  Patient agrees with the plan.    - CBC with platelets  - Uric acid  - indomethacin (INDOCIN) 50 MG capsule  Dispense: 10 capsule; Refill: 0         Return in about 1 week (around 1/2/2022) for Symptoms failing to improve.    Marilynn Akins PA-C  [facility] [place]    Ponce Rincon is a 71 year old female who presents to clinic today for the following health issues:  Chief Complaint   Patient presents with     Urgent Care     Toe Pain     left big toe red,swollen, pain. warm x4 days      HPI    Patient is presenting to urgent care today with complaint of left great toe pain, swelling and redness.  Ongoing symptoms for the past 3 days.  Denies any trauma or injury to the affected toe.  No fevers or chills.  No history of gout.  Treatment tried: None      Review of Systems  Constitutional, HEENT, cardiovascular, pulmonary, GI, , musculoskeletal, neuro, skin, endocrine and psych systems are negative, except as otherwise noted.      Objective    /70   Pulse 75   Temp 98.5  F (36.9  C) (Oral)   Wt 86.2 kg (190 lb)   LMP 10/24/2005   SpO2 97%   BMI 33.13 kg/m    Physical Exam   GENERAL: healthy, alert and no distress  MS: There is moderate swelling, and erythema noted left first MTP joint.  There is tenderness to palpation.  Range of motion is limited due to pain.  No open wounds or sores.

## 2022-01-04 ENCOUNTER — MYC MEDICAL ADVICE (OUTPATIENT)
Dept: INTERNAL MEDICINE | Facility: CLINIC | Age: 72
End: 2022-01-04
Payer: COMMERCIAL

## 2022-01-11 ENCOUNTER — HOSPITAL ENCOUNTER (OUTPATIENT)
Dept: MAMMOGRAPHY | Facility: CLINIC | Age: 72
Discharge: HOME | End: 2022-01-11
Attending: INTERNAL MEDICINE | Admitting: INTERNAL MEDICINE
Payer: COMMERCIAL

## 2022-01-11 DIAGNOSIS — Z12.31 VISIT FOR SCREENING MAMMOGRAM: ICD-10-CM

## 2022-01-11 PROCEDURE — 77067 SCR MAMMO BI INCL CAD: CPT

## 2022-01-16 ENCOUNTER — LAB (OUTPATIENT)
Dept: FAMILY MEDICINE | Facility: CLINIC | Age: 72
End: 2022-01-16
Attending: FAMILY MEDICINE
Payer: COMMERCIAL

## 2022-01-16 DIAGNOSIS — Z20.822 CLOSE EXPOSURE TO 2019 NOVEL CORONAVIRUS: ICD-10-CM

## 2022-01-16 PROCEDURE — U0003 INFECTIOUS AGENT DETECTION BY NUCLEIC ACID (DNA OR RNA); SEVERE ACUTE RESPIRATORY SYNDROME CORONAVIRUS 2 (SARS-COV-2) (CORONAVIRUS DISEASE [COVID-19]), AMPLIFIED PROBE TECHNIQUE, MAKING USE OF HIGH THROUGHPUT TECHNOLOGIES AS DESCRIBED BY CMS-2020-01-R: HCPCS

## 2022-01-16 PROCEDURE — U0005 INFEC AGEN DETEC AMPLI PROBE: HCPCS

## 2022-01-16 PROCEDURE — 99207 PR NO CHARGE LOS: CPT

## 2022-01-17 LAB — SARS-COV-2 RNA RESP QL NAA+PROBE: NEGATIVE

## 2022-01-18 ENCOUNTER — VIRTUAL VISIT (OUTPATIENT)
Dept: INTERNAL MEDICINE | Facility: CLINIC | Age: 72
End: 2022-01-18
Payer: COMMERCIAL

## 2022-01-18 VITALS — HEART RATE: 71 BPM | SYSTOLIC BLOOD PRESSURE: 141 MMHG | DIASTOLIC BLOOD PRESSURE: 86 MMHG

## 2022-01-18 DIAGNOSIS — M10.279 ACUTE DRUG-INDUCED GOUT INVOLVING TOE, UNSPECIFIED LATERALITY: ICD-10-CM

## 2022-01-18 DIAGNOSIS — E66.01 MORBID OBESITY (H): ICD-10-CM

## 2022-01-18 DIAGNOSIS — F32.5 MAJOR DEPRESSION IN COMPLETE REMISSION (H): ICD-10-CM

## 2022-01-18 DIAGNOSIS — I10 ESSENTIAL HYPERTENSION, BENIGN: Primary | ICD-10-CM

## 2022-01-18 PROCEDURE — 99214 OFFICE O/P EST MOD 30 MIN: CPT | Mod: 95 | Performed by: INTERNAL MEDICINE

## 2022-01-18 RX ORDER — LISINOPRIL 5 MG/1
5 TABLET ORAL DAILY
Qty: 90 TABLET | Refills: 1 | Status: SHIPPED | OUTPATIENT
Start: 2022-01-18 | End: 2022-05-27

## 2022-01-18 NOTE — PROGRESS NOTES
Mckenzie is a 71 year old who is being evaluated via a billable video visit.      How would you like to obtain your AVS? MyChart  If the video visit is dropped, the invitation should be resent by: Text to cell phone: [phone]  Will anyone else be joining your video visit? No      Video Start Time: 1:08 PM    Assessment & Plan     Essential hypertension, benign  Will start Lisinopril to replace the hydrochlorathiazide Follow up in March  - lisinopril (ZESTRIL) 5 MG tablet; Take 1 tablet (5 mg) by mouth daily    Acute drug-induced gout involving toe, unspecified laterality  Will recheck uric acid when I see her in March    Morbid obesity (H)       Major depression in complete remission (H)  under good control                  Return in about 3 months (around 4/18/2022).    Anna Valerio MD  [facility]    Ponce Rincon is a 71 year old who presents for the following health issues     HPI     Left greater toe pain & redness since 12-. Stopped Maxzide about 1- after taking it for 6 weeks.     ED/UC Followup:    Facility:  [facility]  Date of visit: 12-  Reason for visit: foot pain     The pain and swelling in her foot resolved but her blood pressure is coming up. She has no prior history of gout.     Review of Systems   Constitutional, HEENT, cardiovascular, pulmonary, gi and gu systems are negative, except as otherwise noted.      Objective           Vitals:  No vitals were obtained today due to virtual visit.    Physical Exam   GENERAL: Healthy, alert and no distress  EYES: Eyes grossly normal to inspection.  No discharge or erythema, or obvious scleral/conjunctival abnormalities.  RESP: No audible wheeze, cough, or visible cyanosis.  No visible retractions or increased work of breathing.    SKIN: Visible skin clear. No significant rash, abnormal pigmentation or lesions.  NEURO: Cranial nerves grossly intact.  Mentation and speech appropriate for age.  PSYCH:  Mentation appears normal, affect normal/bright, judgement and insight intact, normal speech and appearance well-groomed.          Video-Visit Details    Type of service:  Video Visit    Video End Time:1:16 PM    Originating Location (pt. Location): Home    Distant Location (provider location):  [facility]     Platform used for Video Visit: [email or web address]

## 2022-03-28 ENCOUNTER — OFFICE VISIT (OUTPATIENT)
Dept: INTERNAL MEDICINE | Facility: CLINIC | Age: 72
End: 2022-03-28
Payer: COMMERCIAL

## 2022-03-28 VITALS
HEART RATE: 62 BPM | SYSTOLIC BLOOD PRESSURE: 178 MMHG | RESPIRATION RATE: 16 BRPM | TEMPERATURE: 97.5 F | OXYGEN SATURATION: 100 % | HEIGHT: 64 IN | BODY MASS INDEX: 33.12 KG/M2 | WEIGHT: 194 LBS | DIASTOLIC BLOOD PRESSURE: 101 MMHG

## 2022-03-28 DIAGNOSIS — I10 ESSENTIAL HYPERTENSION, BENIGN: Primary | ICD-10-CM

## 2022-03-28 PROCEDURE — 80048 BASIC METABOLIC PNL TOTAL CA: CPT | Performed by: INTERNAL MEDICINE

## 2022-03-28 PROCEDURE — 99213 OFFICE O/P EST LOW 20 MIN: CPT | Performed by: INTERNAL MEDICINE

## 2022-03-28 PROCEDURE — 36415 COLL VENOUS BLD VENIPUNCTURE: CPT | Performed by: INTERNAL MEDICINE

## 2022-03-28 RX ORDER — LISINOPRIL 10 MG/1
10 TABLET ORAL DAILY
Qty: 90 TABLET | Refills: 3 | Status: SHIPPED | OUTPATIENT
Start: 2022-03-28 | End: 2022-05-27 | Stop reason: DRUGHIGH

## 2022-03-28 ASSESSMENT — PATIENT HEALTH QUESTIONNAIRE - PHQ9: SUM OF ALL RESPONSES TO PHQ QUESTIONS 1-9: 1

## 2022-03-28 NOTE — PROGRESS NOTES
"  Assessment & Plan     Essential hypertension, benign  under inadequate control Will increase Lisinopril to 10 mg. Follow up in 2 months will need bmp at that time.   - lisinopril (ZESTRIL) 10 MG tablet; Take 1 tablet (10 mg) by mouth daily  - Basic metabolic panel  (Ca, Cl, CO2, Creat, Gluc, K, Na, BUN); Future           BMI:   Estimated body mass index is 33.83 kg/m  as calculated from the following:    Height as of this encounter: 1.613 m (5' 3.5\").    Weight as of this encounter: 88 kg (194 lb).   Weight management plan: Discussed healthy diet and exercise guidelines      Return in about 8 weeks (around 5/23/2022).    Anna Valerio MD  [facility] LLCYASH Rincon is a 71 year old who presents for the following health issues:     HPI     Hypertension Follow-up      Do you check your blood pressure regularly outside of the clinic? Yes     Are you following a low salt diet? Yes    Are your blood pressures ever more than 140 on the top number (systolic) OR more   than 90 on the bottom number (diastolic), for example 140/90? Yes        Review of Systems   Constitutional, HEENT, cardiovascular, pulmonary, gi and gu systems are negative, except as otherwise noted.      Objective    BP (!) 178/101   Pulse 62   Temp 97.5  F (36.4  C) (Oral)   Resp 16   Ht 1.613 m (5' 3.5\")   Wt 88 kg (194 lb)   LMP 10/24/2005   SpO2 100%   Breastfeeding No   BMI 33.83 kg/m    Body mass index is 33.83 kg/m .  Physical Exam   GENERAL: healthy, alert and no distress  NECK: no adenopathy, no asymmetry, masses, or scars and thyroid normal to palpation  RESP: lungs clear to auscultation - no rales, rhonchi or wheezes  CV: regular rate and rhythm, normal S1 S2, no S3 or S4, no murmur, click or rub, no peripheral edema and peripheral pulses strong  ABDOMEN: soft, nontender, no hepatosplenomegaly, no masses and bowel sounds normal  MS: no gross musculoskeletal defects noted, no edema          "

## 2022-03-29 LAB
ANION GAP SERPL CALCULATED.3IONS-SCNC: 2 MMOL/L (ref 3–14)
BUN SERPL-MCNC: 24 MG/DL (ref 7–30)
CALCIUM SERPL-MCNC: 10 MG/DL (ref 8.5–10.1)
CHLORIDE BLD-SCNC: 108 MMOL/L (ref 94–109)
CO2 SERPL-SCNC: 31 MMOL/L (ref 20–32)
CREAT SERPL-MCNC: 0.97 MG/DL (ref 0.52–1.04)
GFR SERPL CREATININE-BSD FRML MDRD: 62 ML/MIN/1.73M2
GLUCOSE BLD-MCNC: 104 MG/DL (ref 70–99)
POTASSIUM BLD-SCNC: 5.2 MMOL/L (ref 3.4–5.3)
SODIUM SERPL-SCNC: 141 MMOL/L (ref 133–144)

## 2022-05-26 NOTE — PROGRESS NOTES
Assessment & Plan     HTN, goal below 140/90  At this time, patient blood pressure is not currently well controlled.  A repeat blood pressure check was noted to be 168/92.  After much discussion, we did elect to continue her metoprolol succinate at 200 mg daily, but her lisinopril dosage will be increased to 20 mg daily for ongoing management of her blood pressure.  Side effects of each medication were reviewed.  I did encourage the patient to try to check her blood pressure at home approximately 3 times per week over the next 2 to 3 weeks.  Patient will contact the clinic with her average blood pressure readings.  We did also briefly discuss dietary lifestyle modifications that can help improve her blood pressure.  I did recommend to the patient that we have a metabolic panel repeated once we see how she responds to the increase in the dose of her lisinopril.  Patient is aware that she can contact the clinic sooner should she have any issues with her medications or other concerns about her blood pressure.  - lisinopril (ZESTRIL) 20 MG tablet; Take 1 tablet (20 mg) by mouth daily    Prescription drug management  30 minutes spent on the date of the encounter doing chart review, history and exam, documentation and further activities per the note       See Patient Instructions    No follow-ups on file.    Tio Rainey MD  [facility] 71 year [facility] who presents for the following health issues: follow up on hypertension and medication review.    Patient is a 71-year-old  female who presents to the clinic for a follow-up visit in regards to her blood pressure.  Patient had been placed on lisinopril 10 mg daily for management of her blood pressure on March 28, 2022.  She has not been checking her blood pressure since being placed on this medication.  Her blood pressure at that time was noted to be 178/101.  Her initial blood pressure upon arrival to the  "clinic was noted to be 179/97.  A repeat blood pressure was slightly improved at 168/92.  Patient also takes metoprolol succinate 200 mg daily as part of her medication regimen.  Patient is tolerating both medications without issue.  She is not currently reporting any issues with headaches, chest pains, shortness of breath, or syncopal episodes.  Patient is concerned that her blood pressure medication needs to be adjusted.    History of Present Illness       Hypertension: She presents for follow up of hypertension.  She does not check blood pressure  regularly outside of the clinic. Outside blood pressures have been over 140/90. She follows a low salt diet.         Review of Systems   Constitutional: Negative.    HENT: Negative.    Respiratory: Negative.    Cardiovascular: Negative.    Gastrointestinal: Negative.    Genitourinary: Negative.    Musculoskeletal: Negative.    Neurological: Negative.             Objective    LMP 10/24/2005   Blood pressure (!) 168/92, pulse 63, temperature 98.1  F (36.7  C), resp. rate 20, height 1.613 m (5' 3.5\"), weight 88.9 kg (196 lb), last menstrual period 10/24/2005, SpO2 98 %, not currently breastfeeding.      Physical Exam  Vitals and nursing note reviewed.   Constitutional:       Appearance: Normal appearance.   HENT:      Head: Normocephalic and atraumatic.      Right Ear: Tympanic membrane, ear canal and external ear normal.      Left Ear: Tympanic membrane, ear canal and external ear normal.      Mouth/Throat:      Mouth: Mucous membranes are moist.      Pharynx: Oropharynx is clear.   Eyes:      Extraocular Movements: Extraocular movements intact.      Conjunctiva/sclera: Conjunctivae normal.      Pupils: Pupils are equal, round, and reactive to light.   Cardiovascular:      Rate and Rhythm: Normal rate and regular rhythm.      Pulses: Normal pulses.      Heart sounds: Normal heart sounds.   Pulmonary:      Effort: Pulmonary effort is normal.      Breath sounds: Normal " breath sounds.   Skin:     General: Skin is warm.      Capillary Refill: Capillary refill takes less than 2 seconds.   Neurological:      General: No focal deficit present.      Mental Status: She is alert and oriented to person, place, and time. Mental status is at baseline.

## 2022-05-27 ENCOUNTER — OFFICE VISIT (OUTPATIENT)
Dept: INTERNAL MEDICINE | Facility: CLINIC | Age: 72
End: 2022-05-27
Payer: COMMERCIAL

## 2022-05-27 VITALS
OXYGEN SATURATION: 98 % | HEIGHT: 64 IN | SYSTOLIC BLOOD PRESSURE: 168 MMHG | RESPIRATION RATE: 20 BRPM | TEMPERATURE: 98.1 F | DIASTOLIC BLOOD PRESSURE: 92 MMHG | HEART RATE: 63 BPM | BODY MASS INDEX: 33.46 KG/M2 | WEIGHT: 196 LBS

## 2022-05-27 DIAGNOSIS — I10 HTN, GOAL BELOW 140/90: Primary | ICD-10-CM

## 2022-05-27 PROCEDURE — 99214 OFFICE O/P EST MOD 30 MIN: CPT | Performed by: INTERNAL MEDICINE

## 2022-05-27 RX ORDER — LISINOPRIL 20 MG/1
20 TABLET ORAL DAILY
Qty: 30 TABLET | Refills: 3 | Status: SHIPPED | OUTPATIENT
Start: 2022-05-27 | End: 2022-06-23

## 2022-05-27 ASSESSMENT — ENCOUNTER SYMPTOMS
CARDIOVASCULAR NEGATIVE: 1
GASTROINTESTINAL NEGATIVE: 1
MUSCULOSKELETAL NEGATIVE: 1
CONSTITUTIONAL NEGATIVE: 1
NEUROLOGICAL NEGATIVE: 1
RESPIRATORY NEGATIVE: 1

## 2022-05-27 NOTE — PATIENT INSTRUCTIONS
Lisinopril will be increased to 20 mg daily. Monitor BP at home 2-3 times per week. Contact clinic with average readings in approx 3 weeks.

## 2022-06-21 ENCOUNTER — MYC MEDICAL ADVICE (OUTPATIENT)
Dept: INTERNAL MEDICINE | Facility: CLINIC | Age: 72
End: 2022-06-21

## 2022-06-21 DIAGNOSIS — I10 HTN, GOAL BELOW 140/90: ICD-10-CM

## 2022-06-23 RX ORDER — LISINOPRIL 20 MG/1
20 TABLET ORAL DAILY
Qty: 90 TABLET | Refills: 1 | Status: SHIPPED | OUTPATIENT
Start: 2022-06-23 | End: 2022-10-19

## 2022-06-23 NOTE — TELEPHONE ENCOUNTER
Per patient's most recent D[employer] message, asking for a 90 day supply of Lisinopril instead of 30 day.  Uses a mail-order pharmacy.    Prescription pended.    Please advise, thanks.

## 2022-08-16 ENCOUNTER — TELEPHONE (OUTPATIENT)
Dept: INTERNAL MEDICINE | Facility: CLINIC | Age: 72
End: 2022-08-16

## 2022-08-16 NOTE — TELEPHONE ENCOUNTER
Patient Quality Outreach    Patient is due for the following:   Hypertension -  BP check  Physical Annual Wellness Visit    Next Steps:   Patient has upcoming appointment, these items will be addressed at that time.    Type of outreach:    Chart review performed, no outreach needed.      Questions for provider review:    None     Leeann Diaz LPN

## 2022-08-24 ENCOUNTER — MYC MEDICAL ADVICE (OUTPATIENT)
Dept: INTERNAL MEDICINE | Facility: CLINIC | Age: 72
End: 2022-08-24

## 2022-08-24 DIAGNOSIS — I10 ESSENTIAL HYPERTENSION, BENIGN: ICD-10-CM

## 2022-08-24 DIAGNOSIS — E78.5 HYPERLIPIDEMIA LDL GOAL <130: ICD-10-CM

## 2022-08-24 DIAGNOSIS — F32.5 MAJOR DEPRESSION IN COMPLETE REMISSION (H): ICD-10-CM

## 2022-08-25 RX ORDER — METOPROLOL SUCCINATE 200 MG/1
200 TABLET, EXTENDED RELEASE ORAL DAILY
Qty: 90 TABLET | Refills: 0 | Status: SHIPPED | OUTPATIENT
Start: 2022-08-25 | End: 2022-10-19

## 2022-08-25 RX ORDER — PRAVASTATIN SODIUM 20 MG
20 TABLET ORAL DAILY
Qty: 90 TABLET | Refills: 0 | Status: SHIPPED | OUTPATIENT
Start: 2022-08-25 | End: 2022-10-19

## 2022-08-25 RX ORDER — VENLAFAXINE HYDROCHLORIDE 150 MG/1
CAPSULE, EXTENDED RELEASE ORAL
Qty: 90 CAPSULE | Refills: 0 | Status: SHIPPED | OUTPATIENT
Start: 2022-08-25 | End: 2022-10-19

## 2022-08-25 NOTE — TELEPHONE ENCOUNTER
Patient sent Friend[employer] message asking for refills on the following medications:    1.  Lisinopril tabs 20mg - should have a refill remaining.  E-scribed 6/23/22 #90 with 1 refill.  PhotoBo[employer] message sent.    2.  Metoprolol Succinate Er Tabs 200mg - last fill 8/16/21 #90 with 3 refills    3.  Venlafaxine Hcl Er Caps 150Mg - last fill 8/16/21 #90 with 3 refills    4.  Pravastatin Tabs 20Mg - last fill 8/16/21 #90 with 3 refills    Future Appointments 8/25/2022 - 2/21/2023              Date Visit Type Length Department Provider     10/24/2022 11:00 AM [facility] ANNUAL WELLNESS 30 min Anna Borrero MD              1/16/2023  9:15 AM [facility] SCREENING MAMMOGRAM 15 min [email or web address]    Location Instructions:     [facility] [address], [address] [place] [phone]   Parking  [facility] customers can park in the lot adjacent to the entrance to the [facility].  Entrance and check-in location  Enter at the front entrance, under the canopy. Take the stairs or elevator from the main entrance to the 2nd floor. Please check-in for your appointment at the desk in the [facility] waiting area.                   Medications are being filled for 1 time refill only due to:  pt has a future appt scheduled.  [employer] message sent.

## 2022-10-17 ENCOUNTER — OFFICE VISIT (OUTPATIENT)
Dept: INTERNAL MEDICINE | Facility: CLINIC | Age: 72
End: 2022-10-17
Payer: COMMERCIAL

## 2022-10-17 ENCOUNTER — TELEPHONE (OUTPATIENT)
Dept: INTERNAL MEDICINE | Facility: CLINIC | Age: 72
End: 2022-10-17

## 2022-10-17 VITALS
HEART RATE: 81 BPM | HEIGHT: 64 IN | RESPIRATION RATE: 15 BRPM | OXYGEN SATURATION: 97 % | BODY MASS INDEX: 33.34 KG/M2 | SYSTOLIC BLOOD PRESSURE: 128 MMHG | WEIGHT: 195.3 LBS | TEMPERATURE: 98.5 F | DIASTOLIC BLOOD PRESSURE: 74 MMHG

## 2022-10-17 DIAGNOSIS — J02.9 SORE THROAT: ICD-10-CM

## 2022-10-17 DIAGNOSIS — J06.9 UPPER RESPIRATORY TRACT INFECTION, UNSPECIFIED TYPE: Primary | ICD-10-CM

## 2022-10-17 LAB — DEPRECATED S PYO AG THROAT QL EIA: NEGATIVE

## 2022-10-17 PROCEDURE — 87651 STREP A DNA AMP PROBE: CPT | Performed by: PHYSICIAN ASSISTANT

## 2022-10-17 PROCEDURE — U0003 INFECTIOUS AGENT DETECTION BY NUCLEIC ACID (DNA OR RNA); SEVERE ACUTE RESPIRATORY SYNDROME CORONAVIRUS 2 (SARS-COV-2) (CORONAVIRUS DISEASE [COVID-19]), AMPLIFIED PROBE TECHNIQUE, MAKING USE OF HIGH THROUGHPUT TECHNOLOGIES AS DESCRIBED BY CMS-2020-01-R: HCPCS | Performed by: PHYSICIAN ASSISTANT

## 2022-10-17 PROCEDURE — 99213 OFFICE O/P EST LOW 20 MIN: CPT | Mod: CS | Performed by: PHYSICIAN ASSISTANT

## 2022-10-17 PROCEDURE — U0005 INFEC AGEN DETEC AMPLI PROBE: HCPCS | Performed by: PHYSICIAN ASSISTANT

## 2022-10-17 RX ORDER — BENZONATATE 200 MG/1
200 CAPSULE ORAL 3 TIMES DAILY PRN
Qty: 30 CAPSULE | Refills: 0 | Status: SHIPPED | OUTPATIENT
Start: 2022-10-17 | End: 2023-09-05

## 2022-10-17 NOTE — TELEPHONE ENCOUNTER
PT calls, she had Video visit at [facility] today and no one called her.     Pt had symptoms for 5 days. Cough and sore throat. Raspy voice. Worse with lying down. Constant tickle.   Feels phlegm in the back of her throat.   She is wondering if has strep throat. Very sore throat.   Mostly a dry cough.     No exposure.     Unsure of fever.     Did a few COVID tests. Negative. Last test yesterday.     Taking Delsym OTC.     Scheduled this afternoon with Monica Diaz PA-C.   She agrees.

## 2022-10-17 NOTE — PROGRESS NOTES
Assessment & Plan     Upper respiratory tract infection, unspecified type  COVID PCR today; we discussed potential for testing to be negative early in the illness. We will check today on day 5 to see if this is COVID. Otherwise likely to be a different respiratory virus. We discussed home cares. Tessalon for cough. Follow-up if symptoms worsen.  - Symptomatic; Yes; 10/20/2022 COVID-19 Virus (Coronavirus) by PCR Nasopharyngeal  - benzonatate (TESSALON) 200 MG capsule; Take 1 capsule (200 mg) by mouth 3 times daily as needed for cough    Sore throat  Rapid strep negative. PCR pending. Low suspicion for strep due to lack of fever, presence of cough, and lack of tonsillar exudate/cervical adenopathy.  - Streptococcus A Rapid Screen w/Reflex to PCR - Clinic Collect  - Group A Streptococcus PCR Throat Swab    Prescription drug management  20 minutes spent on the date of the encounter doing chart review, history and exam, documentation and further activities per the note       No follow-ups on file.    [facility]NGELA [facility] is a 71 year old, presenting for the following health issues:  Consult      HPI     Acute Illness  Acute illness concerns: Cough, sore throat  Onset/Duration: 10/13  Symptoms:  Fever: No  Chills/Sweats: No  Headache (location?): YES  Sinus Pressure: No  Conjunctivitis:  No  Ear Pain: no  Rhinorrhea: YES  Congestion: YES  Sore Throat: YES  Cough: YES  Wheeze: No  Decreased Appetite: No  Nausea: No  Vomiting: No  Diarrhea: No  Dysuria/Freq.: No  Dysuria or Hematuria: No  Sick/Strep Exposure: No  Therapies tried and outcome: Delsym  Started Thurs with sore throat and slight cough  Blowing nose  Hoarse voice, worse today  No known exposures  Negative home COVID tests Sun (10/16) and Fri (10/14)  Hurts to swallow    Delsym not helping  Persistent cough  Breathing well  No fevers  No chills, night sweats, body aches  No N/V/D  Denies eye  "symptoms    No recent travel    Review of Systems   Constitutional, HEENT, cardiovascular, pulmonary, gi and gu systems are negative, except as otherwise noted.      Objective    /74   Pulse 81   Temp 98.5  F (36.9  C) (Tympanic)   Resp 15   Ht 1.613 m (5' 3.5\")   Wt 88.6 kg (195 lb 4.8 oz)   LMP 10/24/2005   SpO2 97%   BMI 34.05 kg/m    Body mass index is 34.05 kg/m .  Physical Exam   GENERAL: healthy, alert and no distress  EYES: Yellow drainage at bilateral medial canthi, but no conjunctival injection  HENT: ear canals and TM's normal, nose and mouth without ulcers or lesions  NECK: no adenopathy, no asymmetry, masses, or scars and thyroid normal to palpation  RESP: lungs clear to auscultation - no rales, rhonchi or wheezes  CV: regular rate and rhythm, normal S1 S2, no S3 or S4, no murmur, click or rub, no peripheral edema and peripheral pulses strong  MS: no gross musculoskeletal defects noted, no edema  SKIN: no suspicious lesions or rashes    Results for orders placed or performed in visit on 10/17/22 (from the past 24 hour(s))   Streptococcus A Rapid Screen w/Reflex to PCR - Clinic Collect    Specimen: Throat; Swab   Result Value Ref Range    Group A Strep antigen Negative Negative                 "

## 2022-10-17 NOTE — NURSING NOTE
"/74   Pulse 81   Temp 98.5  F (36.9  C) (Tympanic)   Resp 15   Ht 1.613 m (5' 3.5\")   Wt 88.6 kg (195 lb 4.8 oz)   LMP 10/24/2005   SpO2 97%   BMI 34.05 kg/m    Patient in for Sore throat, productive cough, nasal congestion x's 5 days. 2 neg covid tests.  "

## 2022-10-18 LAB
GROUP A STREP BY PCR: NOT DETECTED
SARS-COV-2 RNA RESP QL NAA+PROBE: NEGATIVE

## 2022-10-18 ASSESSMENT — ENCOUNTER SYMPTOMS
CONSTIPATION: 0
HEMATOCHEZIA: 0
DYSURIA: 0
WEAKNESS: 0
FEVER: 0
DIZZINESS: 0
SORE THROAT: 1
HEARTBURN: 0
COUGH: 1
FREQUENCY: 1
NAUSEA: 0
CHILLS: 0
DIARRHEA: 0
MYALGIAS: 0
HEADACHES: 0
NERVOUS/ANXIOUS: 0
HEMATURIA: 0
BREAST MASS: 0
ABDOMINAL PAIN: 0
EYE PAIN: 0
SHORTNESS OF BREATH: 0
PARESTHESIAS: 0
ARTHRALGIAS: 0
PALPITATIONS: 0
JOINT SWELLING: 1

## 2022-10-18 ASSESSMENT — ACTIVITIES OF DAILY LIVING (ADL): CURRENT_FUNCTION: NO ASSISTANCE NEEDED

## 2022-10-19 ENCOUNTER — OFFICE VISIT (OUTPATIENT)
Dept: INTERNAL MEDICINE | Facility: CLINIC | Age: 72
End: 2022-10-19
Payer: COMMERCIAL

## 2022-10-19 VITALS
BODY MASS INDEX: 33.05 KG/M2 | OXYGEN SATURATION: 95 % | HEART RATE: 86 BPM | HEIGHT: 64 IN | SYSTOLIC BLOOD PRESSURE: 138 MMHG | RESPIRATION RATE: 16 BRPM | DIASTOLIC BLOOD PRESSURE: 86 MMHG | WEIGHT: 193.6 LBS | TEMPERATURE: 99.9 F

## 2022-10-19 DIAGNOSIS — Z13.220 SCREENING FOR HYPERLIPIDEMIA: ICD-10-CM

## 2022-10-19 DIAGNOSIS — F33.42 RECURRENT MAJOR DEPRESSIVE DISORDER, IN FULL REMISSION (H): ICD-10-CM

## 2022-10-19 DIAGNOSIS — Z00.00 PREVENTATIVE HEALTH CARE: Primary | ICD-10-CM

## 2022-10-19 DIAGNOSIS — M25.572 PAIN IN JOINT, ANKLE AND FOOT, LEFT: ICD-10-CM

## 2022-10-19 DIAGNOSIS — E78.5 HYPERLIPIDEMIA LDL GOAL <130: ICD-10-CM

## 2022-10-19 DIAGNOSIS — I10 ESSENTIAL HYPERTENSION, BENIGN: ICD-10-CM

## 2022-10-19 LAB
BASOPHILS # BLD AUTO: 0 10E3/UL (ref 0–0.2)
BASOPHILS NFR BLD AUTO: 0 %
EOSINOPHIL # BLD AUTO: 0 10E3/UL (ref 0–0.7)
EOSINOPHIL NFR BLD AUTO: 0 %
ERYTHROCYTE [DISTWIDTH] IN BLOOD BY AUTOMATED COUNT: 12.2 % (ref 10–15)
HCT VFR BLD AUTO: 42.5 % (ref 35–47)
HGB BLD-MCNC: 13.9 G/DL (ref 11.7–15.7)
IMM GRANULOCYTES # BLD: 0 10E3/UL
IMM GRANULOCYTES NFR BLD: 0 %
LYMPHOCYTES # BLD AUTO: 1.6 10E3/UL (ref 0.8–5.3)
LYMPHOCYTES NFR BLD AUTO: 29 %
MCH RBC QN AUTO: 30.9 PG (ref 26.5–33)
MCHC RBC AUTO-ENTMCNC: 32.7 G/DL (ref 31.5–36.5)
MCV RBC AUTO: 94 FL (ref 78–100)
MONOCYTES # BLD AUTO: 0.6 10E3/UL (ref 0–1.3)
MONOCYTES NFR BLD AUTO: 11 %
NEUTROPHILS # BLD AUTO: 3.4 10E3/UL (ref 1.6–8.3)
NEUTROPHILS NFR BLD AUTO: 60 %
PLATELET # BLD AUTO: 271 10E3/UL (ref 150–450)
RBC # BLD AUTO: 4.5 10E6/UL (ref 3.8–5.2)
WBC # BLD AUTO: 5.7 10E3/UL (ref 4–11)

## 2022-10-19 PROCEDURE — 90662 IIV NO PRSV INCREASED AG IM: CPT | Performed by: INTERNAL MEDICINE

## 2022-10-19 PROCEDURE — G0008 ADMIN INFLUENZA VIRUS VAC: HCPCS | Performed by: INTERNAL MEDICINE

## 2022-10-19 PROCEDURE — 80061 LIPID PANEL: CPT | Performed by: INTERNAL MEDICINE

## 2022-10-19 PROCEDURE — 80053 COMPREHEN METABOLIC PANEL: CPT | Performed by: INTERNAL MEDICINE

## 2022-10-19 PROCEDURE — G0439 PPPS, SUBSEQ VISIT: HCPCS | Performed by: INTERNAL MEDICINE

## 2022-10-19 PROCEDURE — 85025 COMPLETE CBC W/AUTO DIFF WBC: CPT | Performed by: INTERNAL MEDICINE

## 2022-10-19 PROCEDURE — 84443 ASSAY THYROID STIM HORMONE: CPT | Performed by: INTERNAL MEDICINE

## 2022-10-19 PROCEDURE — 36415 COLL VENOUS BLD VENIPUNCTURE: CPT | Performed by: INTERNAL MEDICINE

## 2022-10-19 RX ORDER — PRAVASTATIN SODIUM 20 MG
20 TABLET ORAL DAILY
Qty: 90 TABLET | Refills: 3 | Status: SHIPPED | OUTPATIENT
Start: 2022-10-19 | End: 2023-12-11

## 2022-10-19 RX ORDER — VENLAFAXINE HYDROCHLORIDE 150 MG/1
CAPSULE, EXTENDED RELEASE ORAL
Qty: 90 CAPSULE | Refills: 3 | Status: SHIPPED | OUTPATIENT
Start: 2022-10-19 | End: 2023-12-11

## 2022-10-19 RX ORDER — LISINOPRIL 20 MG/1
20 TABLET ORAL DAILY
Qty: 90 TABLET | Refills: 3 | Status: SHIPPED | OUTPATIENT
Start: 2022-10-19 | End: 2023-12-11

## 2022-10-19 RX ORDER — METOPROLOL SUCCINATE 200 MG/1
200 TABLET, EXTENDED RELEASE ORAL DAILY
Qty: 90 TABLET | Refills: 3 | Status: SHIPPED | OUTPATIENT
Start: 2022-10-19 | End: 2023-12-11

## 2022-10-19 ASSESSMENT — ENCOUNTER SYMPTOMS
MYALGIAS: 0
SHORTNESS OF BREATH: 0
PALPITATIONS: 0
FEVER: 0
CHILLS: 0
HEARTBURN: 0
PARESTHESIAS: 0
HEMATOCHEZIA: 0
DIARRHEA: 0
CONSTIPATION: 0
NAUSEA: 0
ABDOMINAL PAIN: 0
NERVOUS/ANXIOUS: 0
DYSURIA: 0
COUGH: 1
HEMATURIA: 0
FREQUENCY: 1
JOINT SWELLING: 1
DIZZINESS: 0
SORE THROAT: 1
WEAKNESS: 0
HEADACHES: 0
BREAST MASS: 0
ARTHRALGIAS: 0
EYE PAIN: 0

## 2022-10-19 ASSESSMENT — ACTIVITIES OF DAILY LIVING (ADL): CURRENT_FUNCTION: NO ASSISTANCE NEEDED

## 2022-10-19 NOTE — RESULT ENCOUNTER NOTE
Dear Mckenzie,    Your COVID test came back normal (negative).  The confirmatory strep test was also normal.    Please contact the clinic if you have additional questions.  Thank you.    Sincerely,    Monica Diaz PA-C

## 2022-10-19 NOTE — PROGRESS NOTES
"SUBJECTIVE:   Mckenzie is a 71 year old who presents for Preventive Visit.    Non-fasting. (Cough drop.)    Patient has been advised of split billing requirements and indicates understanding: Yes  Are you in the first 12 months of your Medicare coverage?  No    Healthy Habits:     In general, how would you rate your overall health?  Good    Frequency of exercise:  1 day/week    Duration of exercise:  Less than 15 minutes    Do you usually eat at least 4 servings of fruit and vegetables a day, include whole grains    & fiber and avoid regularly eating high fat or \"junk\" foods?  No    Taking medications regularly:  Yes    Medication side effects:  None    Ability to successfully perform activities of daily living:  No assistance needed    Home Safety:  No safety concerns identified    Hearing Impairment:  No hearing concerns    In the past 6 months, have you been bothered by leaking of urine? Yes    In general, how would you rate your overall mental or emotional health?  Excellent      PHQ-2 Total Score: 0    Additional concerns today:  No    Do you feel safe in your environment? Yes    Have you ever done Advance Care Planning? (For example, a Health Directive, POLST, or a discussion with a medical provider or your loved ones about your wishes): Yes, patient states has an Advance Care Planning document and will bring a copy to the clinic.       Fall risk  Fallen 2 or more times in the past year?: No  Any fall with injury in the past year?: No    Cognitive Screening   1) Repeat 3 items (Leader, Season, Table)    2) Clock draw: NORMAL  3) 3 item recall: Recalls 3 objects  Results: 3 items recalled: COGNITIVE IMPAIRMENT LESS LIKELY    Mini-CogTM Copyright VJ Campbell. Licensed by the author for use in [facility]; reprinted with permission (amy@.[facility]). All rights reserved.      Do you have sleep apnea, excessive snoring or daytime drowsiness?: no    Reviewed and updated as needed this visit by clinical staff   " Tobacco  Allergies  Meds  Problems  Med Hx  Surg Hx  Fam Hx          Reviewed and updated as needed this visit by Provider                 Social History     Tobacco Use     Smoking status: Never     Smokeless tobacco: Never   Substance Use Topics     Alcohol use: Yes     Comment: rare         Alcohol Use 10/18/2022   Prescreen: >3 drinks/day or >7 drinks/week? No   Prescreen: >3 drinks/day or >7 drinks/week? -       Current providers sharing in care for this patient include:   Patient Care Team:  Anna Valerio MD as PCP - General (Internal Medicine)  Anna Valerio MD as Assigned PCP    The following health maintenance items are reviewed in Epic and correct as of today:  Health Maintenance   Topic Date Due     HEPATITIS B IMMUNIZATION (1 of 3 - 3-dose series) Never done     MEDICARE ANNUAL WELLNESS VISIT  08/16/2022     LIPID  08/16/2022     COVID-19 Vaccine (5 - Booster for Moderna series) 09/12/2022     INFLUENZA VACCINE (1) 06/30/2023 (Originally 9/1/2022)     MAMMO SCREENING  01/11/2023     ANNUAL REVIEW OF HM ORDERS  03/28/2023     PHQ-9  04/19/2023     FALL RISK ASSESSMENT  10/19/2023     COLORECTAL CANCER SCREENING  06/16/2026     ADVANCE CARE PLANNING  10/19/2027     DTAP/TDAP/TD IMMUNIZATION (3 - Td or Tdap) 01/02/2028     DEXA  08/14/2033     HEPATITIS C SCREENING  Completed     DEPRESSION ACTION PLAN  Completed     Pneumococcal Vaccine: 65+ Years  Completed     ZOSTER IMMUNIZATION  Completed     IPV IMMUNIZATION  Aged Out     MENINGITIS IMMUNIZATION  Aged Out     BP Readings from Last 3 Encounters:   10/19/22 138/86   10/17/22 128/74   05/27/22 (!) 168/92    Wt Readings from Last 3 Encounters:   10/19/22 87.8 kg (193 lb 9.6 oz)   10/17/22 88.6 kg (195 lb 4.8 oz)   05/27/22 88.9 kg (196 lb)                    Review of Systems   Constitutional: Negative for chills and fever.   HENT: Positive for sore throat. Negative for congestion, ear pain and hearing loss.    Eyes: Negative for pain  "and visual disturbance.   Respiratory: Positive for cough. Negative for shortness of breath.    Cardiovascular: Negative for chest pain, palpitations and peripheral edema.   Gastrointestinal: Negative for abdominal pain, constipation, diarrhea, heartburn, hematochezia and nausea.   Breasts:  Negative for tenderness, breast mass and discharge.   Genitourinary: Positive for frequency and urgency. Negative for dysuria, genital sores, hematuria, pelvic pain, vaginal bleeding and vaginal discharge.   Musculoskeletal: Positive for joint swelling. Negative for arthralgias and myalgias.   Skin: Negative for rash.   Neurological: Negative for dizziness, weakness, headaches and paresthesias.   Psychiatric/Behavioral: Negative for mood changes. The patient is not nervous/anxious.        OBJECTIVE:   /86   Pulse 86   Temp 99.9  F (37.7  C) (Oral)   Resp 16   Ht 1.613 m (5' 3.5\")   Wt 87.8 kg (193 lb 9.6 oz)   LMP 10/24/2005   SpO2 95%   BMI 33.76 kg/m   Estimated body mass index is 33.76 kg/m  as calculated from the following:    Height as of this encounter: 1.613 m (5' 3.5\").    Weight as of this encounter: 87.8 kg (193 lb 9.6 oz).  Physical Exam  GENERAL: healthy, alert and no distress  EYES: Eyes grossly normal to inspection, PERRL and conjunctivae and sclerae normal  NECK: no adenopathy, no asymmetry, masses, or scars and thyroid normal to palpation  RESP: lungs clear to auscultation - no rales, rhonchi or wheezes  CV: regular rate and rhythm, normal S1 S2, no S3 or S4, no murmur, click or rub, no peripheral edema and peripheral pulses strong  ABDOMEN: soft, nontender, no hepatosplenomegaly, no masses and bowel sounds normal  MS: no gross musculoskeletal defects noted, no edema  SKIN: no suspicious lesions or rashes  NEURO: Normal strength and tone, mentation intact and speech normal  PSYCH: mentation appears normal, affect normal/bright    ASSESSMENT / PLAN:   (Z00.00) Preventative health care  (primary " "encounter diagnosis)  Comment:    Plan: CBC with platelets and differential,         Comprehensive metabolic panel (BMP + Alb, Alk         Phos, ALT, AST, Total. Bili, TP), TSH with free        T4 reflex             (M25.572) Pain in joint, ankle and foot, left  Comment:    Plan: Orthopedic  Referral             (I10) Essential hypertension, benign  Comment:    Plan: lisinopril (ZESTRIL) 20 MG tablet, metoprolol         succinate ER (TOPROL XL) 200 MG 24 hr tablet             (Z13.220) Screening for hyperlipidemia  Comment:    Plan: Lipid panel reflex to direct LDL Non-fasting             (F33.42) Recurrent major depressive disorder, in full remission (H)  Comment:    Plan: venlafaxine (EFFEXOR XR) 150 MG 24 hr capsule             (E78.5) Hyperlipidemia LDL goal <130  Comment:    Plan: pravastatin (PRAVACHOL) 20 MG tablet               Patient has been advised of split billing requirements and indicates understanding: Yes    COUNSELING:  Reviewed preventive health counseling, as reflected in patient instructions       Regular exercise       Healthy diet/nutrition    Estimated body mass index is 33.76 kg/m  as calculated from the following:    Height as of this encounter: 1.613 m (5' 3.5\").    Weight as of this encounter: 87.8 kg (193 lb 9.6 oz).    Weight management plan: Discussed healthy diet and exercise guidelines    She reports that she has never smoked. She has never used smokeless tobacco.      Appropriate preventive services were discussed with this patient, including applicable screening as appropriate for cardiovascular disease, diabetes, osteopenia/osteoporosis, and glaucoma.  As appropriate for age/gender, discussed screening for colorectal cancer, prostate cancer, breast cancer, and cervical cancer. Checklist reviewing preventive services available has been given to the patient.    Reviewed patients plan of care and provided an AVS. The Basic Care Plan (routine screening as documented in Health " Maintenance) for Shanelle meets the Care Plan requirement. This Care Plan has been established and reviewed with the Patient.    Counseling Resources:  ATP IV Guidelines  Pooled Cohorts Equation Calculator  Breast Cancer Risk Calculator  Breast Cancer: Medication to Reduce Risk  FRAX Risk Assessment  ICSI Preventive Guidelines  Dietary Guidelines for Americans, 2010  amprice's MyPlate  ASA Prophylaxis  Lung CA Screening    Anna Valerio MD  [facility]    Identified Health Risks:  Answers for HPI/ROS submitted by the patient on 10/19/2022  If you checked off any problems, how difficult have these problems made it for you to do your work, take care of things at home, or get along with other people?: Not difficult at all  PHQ9 TOTAL SCORE: 2

## 2022-10-20 LAB
ALBUMIN SERPL-MCNC: 3.8 G/DL (ref 3.4–5)
ALP SERPL-CCNC: 78 U/L (ref 40–150)
ALT SERPL W P-5'-P-CCNC: 41 U/L (ref 0–50)
ANION GAP SERPL CALCULATED.3IONS-SCNC: 8 MMOL/L (ref 3–14)
AST SERPL W P-5'-P-CCNC: 26 U/L (ref 0–45)
BILIRUB SERPL-MCNC: 0.4 MG/DL (ref 0.2–1.3)
BUN SERPL-MCNC: 17 MG/DL (ref 7–30)
CALCIUM SERPL-MCNC: 9.8 MG/DL (ref 8.5–10.1)
CHLORIDE BLD-SCNC: 101 MMOL/L (ref 94–109)
CHOLEST SERPL-MCNC: 192 MG/DL
CO2 SERPL-SCNC: 29 MMOL/L (ref 20–32)
CREAT SERPL-MCNC: 0.87 MG/DL (ref 0.52–1.04)
FASTING STATUS PATIENT QL REPORTED: YES
GFR SERPL CREATININE-BSD FRML MDRD: 71 ML/MIN/1.73M2
GLUCOSE BLD-MCNC: 110 MG/DL (ref 70–99)
HDLC SERPL-MCNC: 49 MG/DL
LDLC SERPL CALC-MCNC: 112 MG/DL
NONHDLC SERPL-MCNC: 143 MG/DL
POTASSIUM BLD-SCNC: 3.8 MMOL/L (ref 3.4–5.3)
PROT SERPL-MCNC: 7.8 G/DL (ref 6.8–8.8)
SODIUM SERPL-SCNC: 138 MMOL/L (ref 133–144)
TRIGL SERPL-MCNC: 153 MG/DL
TSH SERPL DL<=0.005 MIU/L-ACNC: 0.84 MU/L (ref 0.4–4)

## 2022-11-09 ENCOUNTER — IMMUNIZATION (OUTPATIENT)
Dept: FAMILY MEDICINE | Facility: CLINIC | Age: 72
End: 2022-11-09
Payer: COMMERCIAL

## 2022-11-09 PROCEDURE — 91313 COVID-19,PF,MODERNA BIVALENT: CPT

## 2022-11-09 PROCEDURE — 0134A COVID-19,PF,MODERNA BIVALENT: CPT

## 2023-01-12 ENCOUNTER — ANCILLARY PROCEDURE (OUTPATIENT)
Dept: GENERAL RADIOLOGY | Facility: CLINIC | Age: 73
End: 2023-01-12
Attending: STUDENT IN AN ORGANIZED HEALTH CARE EDUCATION/TRAINING PROGRAM
Payer: COMMERCIAL

## 2023-01-12 ENCOUNTER — OFFICE VISIT (OUTPATIENT)
Dept: ORTHOPEDICS | Facility: CLINIC | Age: 73
End: 2023-01-12
Attending: INTERNAL MEDICINE
Payer: COMMERCIAL

## 2023-01-12 VITALS — BODY MASS INDEX: 32.44 KG/M2 | HEIGHT: 64 IN | WEIGHT: 190 LBS

## 2023-01-12 DIAGNOSIS — G89.29 CHRONIC PAIN OF LEFT ANKLE: ICD-10-CM

## 2023-01-12 DIAGNOSIS — M25.572 PAIN IN JOINT, ANKLE AND FOOT, LEFT: ICD-10-CM

## 2023-01-12 DIAGNOSIS — M25.572 CHRONIC PAIN OF LEFT ANKLE: ICD-10-CM

## 2023-01-12 DIAGNOSIS — M25.572 CHRONIC PAIN OF LEFT ANKLE: Primary | ICD-10-CM

## 2023-01-12 DIAGNOSIS — G89.29 CHRONIC PAIN OF LEFT ANKLE: Primary | ICD-10-CM

## 2023-01-12 PROCEDURE — 73610 X-RAY EXAM OF ANKLE: CPT | Mod: TC | Performed by: RADIOLOGY

## 2023-01-12 PROCEDURE — 99204 OFFICE O/P NEW MOD 45 MIN: CPT | Performed by: STUDENT IN AN ORGANIZED HEALTH CARE EDUCATION/TRAINING PROGRAM

## 2023-01-12 NOTE — PROGRESS NOTES
"ASSESSMENT & PLAN       1. Chronic pain of left ankle    2. Pain in joint, ankle and foot, left      Shanelle Sepulveda is a 72 year old female presenting for evaluation of mild medial left ankle pain.  History, exam and imaging findings we reviewed today, consistent with mild arthritis of the left ankle and midfoot.    Upon discussion, plan to proceed with the following:  - Recommend supportive footwear. Consider SuperFeet arch supports.  - Home exercises discussed and provided to work on ankle mobility, balance and strengthening.  - Activity as tolerated based on pain.  - May use heat or ice as needed.   - Tylenol or ibuprofen as needed. May use topical Voltaren gel as an alternative to oral ibuprofen.    Follow up as needed for persistence or worsening of pain. You may call our direct clinic number ([phone]) at any time with questions or concerns.    Keyonna Matias MD, [facility]      -----    SUBJECTIVE  Shanelle Sepulveda is a/an 72 year old female who is seen in consultation at the request of  Anna Valerio M.D. for evaluation of left ankle pain. The patient is seen by themselves.    Onset: \"Months\". Reports insidious onset without acute precipitating event. Pain comes and goes but has not resolved.  Location of Pain: left medial ankle with occasional pain radiating to left anterior ankle  Rating of Pain at worst: 4/10  Rating of Pain Currently: 0/10  Worsened by: prolonged standing and walking  Better with: rest / activity avoidance  Treatments tried: rest/activity avoidance  Associated symptoms: swelling over left medial ankle  Orthopedic history: YES - patient reports h/o gout in left foot and great toe  Relevant surgical history: NO  Social history: retired    Past Medical History:   Diagnosis Date     Intramural leiomyoma of uterus      MENOPAUSAL DISORDER NOS 8/10/2005    Menses getting much heavier, pelvic sono 02/06--2 uterine fibroids seen, 1.5 " "and 2.7cm each; simple left ovarian cyst 4.2 cm, 2.6 cm complex right ovarian cyst--endometrial biopsy benign     Symptomatic menopausal or female climacteric states      Social History     Socioeconomic History     Marital status:    Tobacco Use     Smoking status: Never     Smokeless tobacco: Never   Vaping Use     Vaping Use: Never used   Substance and Sexual Activity     Alcohol use: Yes     Comment: rare     Drug use: No     Sexual activity: Yes     Partners: Male     Birth control/protection: Surgical     Comment: hyster.   Other Topics Concern     Parent/sibling w/ CABG, MI or angioplasty before 65F 55M? No          Patient's past medical, surgical, social, and family histories were reviewed today and no changes are noted.    REVIEW OF SYSTEMS:  10 point ROS is negative other than symptoms noted above in HPI, Past Medical History or as stated below  Constitutional: NEGATIVE for fever, chills, change in weight  Skin: NEGATIVE for worrisome rashes, moles or lesions  GI/: NEGATIVE for bowel or bladder changes  Neuro: NEGATIVE for weakness, dizziness or paresthesias    OBJECTIVE:  Ht 1.613 m (5' 3.5\")   Wt 86.2 kg (190 lb)   LMP 10/24/2005   BMI 33.13 kg/m     General: healthy, alert and in no distress  HEENT: no scleral icterus or conjunctival erythema  Skin: no suspicious lesions or rash. No jaundice.  CV:  no pedal edema  Resp: normal respiratory effort without conversational dyspnea   Psych: normal mood and affect  Gait: normal steady gait with appropriate coordination and balance  Neuro: Normal light sensory exam of lower extremity  MSK:  LEFT ANKLE  Inspection:    No swelling or ecchymosis is observed. Normal arch.  Palpation:    Tender about the ankle mortise (mild). Remainder of bony and ligamentous landmarks are nontender.  Range of Motion:     Plantarflexion within normal limits / dorsiflexion limited by tightness / inversion within normal limits / eversion within normal limits  Strength:    " 5/5 plantarflexion, dorsiflexion, inversion, eversion  Special Tests:    negative anterior drawer, negative talar tilt, negative valgus stress, negative forced external rotation/eversion, negative Cárdenas sign, negative squeeze test. Able to perform heel raise.    Independent review of the below imaging:  Recent Results (from the past 24 hour(s))   XR Ankle Left G/E 3 Views    Narrative    XR ANKLE LEFT G/E 3 VIEWS 1/12/2023 2:10 PM     HISTORY: Chronic pain of left ankle; Chronic pain of left ankle    COMPARISON: None.      Impression    IMPRESSION: Chronic ossicle adjacent to the medial malleolus  consistent with sequela from prior injury. The ankle mortise is  intact. The talar dome is unremarkable. No ankle joint effusion. Small  plantar and Achilles calcaneal spurs.     JAY HOANG MD         SYSTEM ID:  [id number]     Keyonna Matias MD, CAQSM  [facility]

## 2023-01-12 NOTE — PATIENT INSTRUCTIONS
1. Chronic pain of left ankle    2. Pain in joint, ankle and foot, left      Shanelle Sepulveda is a 72 year old female presenting for evaluation of mild medial left ankle pain.  History, exam and imaging findings we reviewed today, consistent with mild arthritis of the left ankle and midfoot.    Upon discussion, plan to proceed with the following:  - Recommend supportive footwear. Consider SuperFeet arch supports.  - Home exercises discussed and provided to work on ankle mobility, balance and strengthening.  - Activity as tolerated based on pain.  - May use heat or ice as needed.   - Tylenol or ibuprofen as needed. May use topical Voltaren gel as an alternative to oral ibuprofen.    Follow up as needed for persistence or worsening of pain. You may call our direct clinic number ([phone]) at any time with questions or concerns.    Keyonna Matias MD, [facility]    Ankle Exercises    As soon as it doesn t hurt too much to put pressure on the ball of your foot, start stretching your ankle using the towel stretch. When this stretch is easy, try the other exercises.    Towel stretch: Sit on a hard surface with your injured leg stretched out in front of you. Loop a towel around your toes and the ball of your foot and pull the towel toward your body keeping your leg straight. Hold this position for 15 to 30 seconds and then relax. Repeat 3 times.    Standing calf stretch: Stand facing a wall with your hands on the wall at about eye level. Keep your injured leg back with your heel on the floor. Keep the other leg forward with the knee bent. Turn your back foot slightly inward (as if you were pigeon-toed). Slowly lean into the wall until you feel a stretch in the back of your calf. Hold the stretch for 15 to 30 seconds. Return to the starting position. Repeat 3 times. Do this exercise several times each day.    Standing soleus stretch: Stand facing a wall with your hands on  the wall at about chest height. Keep your injured leg back with your heel on the floor. Keep the other leg forward with the knee bent. Turn your back foot slightly inward (as if you were pigeon-toed). Bend your back knee slightly and gently lean into the wall until you feel a stretch in the lower calf of your injured leg. Hold the stretch for 15 to 30 seconds. Return to the starting position. Repeat 3 times.    Ankle range of motion: Sit or lie down with your legs straight and your knees pointing toward the ceiling. Point your toes on your injured side toward your nose, then away from your body. Point your toes in toward your other foot and then out away from your other foot. Finally, move the top of your foot in circles. Move only your foot and ankle. Don't move your leg. Repeat 10 times in each direction. Push hard in all directions.  Resisted ankle dorsiflexion: Tie a knot in one end of the elastic tubing and shut the knot in a door. Tie a loop in the other end of the tubing and put the foot on your injured side through the loop so that the tubing goes around the top of the foot. Sit facing the door with your injured leg straight out in front of you. Move away from the door until there is tension in the tubing. Keeping your leg straight, pull the top of your foot toward your body, stretching the tubing. Slowly return to the starting position. Do 2 sets of 15.  Resisted ankle plantar flexion: Sit with your injured leg stretched out in front of you. Loop the tubing around the ball of your foot. Hold the ends of the tubing with both hands. Gently press the ball of your foot down and point your toes, stretching the tubing. Return to the starting position. Do 2 sets of 15.    Resisted ankle inversion: Sit with your legs stretched out in front of you. Cross the ankle of your uninjured leg over your other ankle. Wrap elastic tubing around the ball of the foot of your injured leg and then loop it around your other foot  so that the tubing is anchored there at one end. Hold the other end of the tubing in your hand. Turn the foot of your injured leg inward and upward. This will stretch the tubing. Return to the starting position. Do 2 sets of 15.    Resisted ankle eversion: Sit with both legs stretched out in front of you, with your feet about a shoulder's width apart. Tie a loop in one end of elastic tubing. Put the foot of your injured leg through the loop so that the tubing goes around the arch of that foot and wraps around the outside of the other foot. Hold onto the other end of the tubing with your hand to provide tension. Turn the foot of your injured leg up and out. Make sure you keep your other foot still so that it will allow the tubing to stretch as you move the foot of your injured leg. Return to the starting position. Do 2 sets of 15.  You may do the following exercises when you can stand on your injured ankle without pain.    Heel raise: Stand behind a chair or counter with both feet flat on the floor. Using the chair or counter as a support, rise up onto your toes and hold for 5 seconds. Then slowly lower yourself down without holding onto the support. (It's OK to keep holding onto the support if you need to.) When this exercise becomes less painful, try doing this exercise while you are standing on the injured leg only. Repeat 15 times. Do 2 sets of 15. Rest 30 seconds between sets.    Step-up: Stand with the foot of your injured leg on a support 3 to 5 inches (8 to 13 centimeters) high --like a small step or block of wood. Keep your other foot flat on the floor. Shift your weight onto the injured leg on the support. Straighten your injured leg as the other leg comes off the floor. Return to the starting position by bending your injured leg and slowly lowering your uninjured leg back to the floor. Do 2 sets of 15.    Balance and reach exercises: Stand next to a chair with your injured leg farther from the chair. The  chair will provide support if you need it. Stand on the foot of your injured leg and bend your knee slightly. Try to raise the arch of this foot while keeping your big toe on the floor. Keep your foot in this position.    With the hand that is farther away from the chair, reach forward in front of you by bending at the waist. Avoid bending your knee any more as you do this. Repeat this 15 times. To make the exercise more challenging, reach farther in front of you. Do 2 sets of 15.    While keeping your arch raised, reach the hand that is farther away from the chair across your body toward the chair. The farther you reach, the more challenging the exercise. Do 2 sets of 15.    Side-lying leg lift: Lie on your uninjured side. Tighten the front thigh muscles on your injured leg and lift that leg 8 to 10 inches (20 to 25 centimeters) away from the other leg. Keep the leg straight and lower it slowly. Do 2 sets of 15.  If you have access to a wobble board, do the following exercises:    Wobble board exercises  Stand on a wobble board with your feet shoulder-width apart.    Rock the board forwards and backwards 30 times, then side to side 30 times. Hold on to a chair if you need support.  Rotate the wobble board around so that the edge of the board is in contact with the floor at all times. Do this 30 times in a clockwise and then a counterclockwise direction.  Balance on the wobble board for as long as you can without letting the edges touch the floor. Try to do this for 2 minutes without touching the floor.  Rotate the wobble board in clockwise and counterclockwise circles, but do not let the edge of the board touch the floor.  When you have mastered the wobble exercises standing on both legs, try repeating them while standing on just your injured leg. After you are able to do these exercises on one leg, try to do them with your eyes closed. Make sure you have something nearby to support you in case you lose your  balance.    Developed by [employer].  Published by [employer].  Copyright  2014 [employer] and/or one of its subsidiaries. All rights reserved.

## 2023-01-12 NOTE — LETTER
"    1/12/2023         RE: Shanelle Sepulveda  [address] [facility] [address]        Dear Colleague,    Thank you for referring your patient, Shanelle Sepulveda, to the [facility] [place]. Please see a copy of my visit note below.    ASSESSMENT & PLAN       1. Chronic pain of left ankle    2. Pain in joint, ankle and foot, left      Shanelle Sepulveda is a 72 year old female presenting for evaluation of mild medial left ankle pain.  History, exam and imaging findings we reviewed today, consistent with mild arthritis of the left ankle and midfoot.    Upon discussion, plan to proceed with the following:  - Recommend supportive footwear. Consider SuperFeet arch supports.  - Home exercises discussed and provided to work on ankle mobility, balance and strengthening.  - Activity as tolerated based on pain.  - May use heat or ice as needed.   - Tylenol or ibuprofen as needed. May use topical Voltaren gel as an alternative to oral ibuprofen.    Follow up as needed for persistence or worsening of pain. You may call our direct clinic number ([phone]) at any time with questions or concerns.    Keyonna Matias MD, [facility]      -----    SUBJECTIVE  Shanelle Sepulveda is a/an 72 year old female who is seen in consultation at the request of  Anna Valerio M.D. for evaluation of left ankle pain. The patient is seen by themselves.    Onset: \"Months\". Reports insidious onset without acute precipitating event. Pain comes and goes but has not resolved.  Location of Pain: left medial ankle with occasional pain radiating to left anterior ankle  Rating of Pain at worst: 4/10  Rating of Pain Currently: 0/10  Worsened by: prolonged standing and walking  Better with: rest / activity avoidance  Treatments tried: rest/activity avoidance  Associated symptoms: swelling over left medial ankle  Orthopedic history: YES - patient reports h/o gout in left " "foot and great toe  Relevant surgical history: NO  Social history: retired    Past Medical History:   Diagnosis Date     Intramural leiomyoma of uterus      MENOPAUSAL DISORDER NOS 8/10/2005    Menses getting much heavier, pelvic sono 02/06--2 uterine fibroids seen, 1.5 and 2.7cm each; simple left ovarian cyst 4.2 cm, 2.6 cm complex right ovarian cyst--endometrial biopsy benign     Symptomatic menopausal or female climacteric states      Social History     Socioeconomic History     Marital status:    Tobacco Use     Smoking status: Never     Smokeless tobacco: Never   Vaping Use     Vaping Use: Never used   Substance and Sexual Activity     Alcohol use: Yes     Comment: rare     Drug use: No     Sexual activity: Yes     Partners: Male     Birth control/protection: Surgical     Comment: hyster.   Other Topics Concern     Parent/sibling w/ CABG, MI or angioplasty before 65F 55M? No          Patient's past medical, surgical, social, and family histories were reviewed today and no changes are noted.    REVIEW OF SYSTEMS:  10 point ROS is negative other than symptoms noted above in HPI, Past Medical History or as stated below  Constitutional: NEGATIVE for fever, chills, change in weight  Skin: NEGATIVE for worrisome rashes, moles or lesions  GI/: NEGATIVE for bowel or bladder changes  Neuro: NEGATIVE for weakness, dizziness or paresthesias    OBJECTIVE:  Ht 1.613 m (5' 3.5\")   Wt 86.2 kg (190 lb)   LMP 10/24/2005   BMI 33.13 kg/m     General: healthy, alert and in no distress  HEENT: no scleral icterus or conjunctival erythema  Skin: no suspicious lesions or rash. No jaundice.  CV:  no pedal edema  Resp: normal respiratory effort without conversational dyspnea   Psych: normal mood and affect  Gait: normal steady gait with appropriate coordination and balance  Neuro: Normal light sensory exam of lower extremity  MSK:  LEFT ANKLE  Inspection:    No swelling or ecchymosis is observed. Normal arch.  Palpation:    " Tender about the ankle mortise (mild). Remainder of bony and ligamentous landmarks are nontender.  Range of Motion:     Plantarflexion within normal limits / dorsiflexion limited by tightness / inversion within normal limits / eversion within normal limits  Strength:    5/5 plantarflexion, dorsiflexion, inversion, eversion  Special Tests:    negative anterior drawer, negative talar tilt, negative valgus stress, negative forced external rotation/eversion, negative Cárdenas sign, negative squeeze test. Able to perform heel raise.    Independent review of the below imaging:  Recent Results (from the past 24 hour(s))   XR Ankle Left G/E 3 Views    Narrative    XR ANKLE LEFT G/E 3 VIEWS 1/12/2023 2:10 PM     HISTORY: Chronic pain of left ankle; Chronic pain of left ankle    COMPARISON: None.      Impression    IMPRESSION: Chronic ossicle adjacent to the medial malleolus  consistent with sequela from prior injury. The ankle mortise is  intact. The talar dome is unremarkable. No ankle joint effusion. Small  plantar and Achilles calcaneal spurs.     JAY HOANG MD         SYSTEM ID:  [id number]     Keyonna Matias MD, [facility]        Again, thank you for allowing me to participate in the care of your patient.        Sincerely,        Keyonna Matias MD

## 2023-01-16 ENCOUNTER — HOSPITAL ENCOUNTER (OUTPATIENT)
Dept: MAMMOGRAPHY | Facility: CLINIC | Age: 73
Discharge: HOME | End: 2023-01-16
Attending: INTERNAL MEDICINE | Admitting: INTERNAL MEDICINE
Payer: COMMERCIAL

## 2023-01-16 DIAGNOSIS — Z12.31 VISIT FOR SCREENING MAMMOGRAM: ICD-10-CM

## 2023-01-16 PROCEDURE — 77067 SCR MAMMO BI INCL CAD: CPT

## 2023-01-19 NOTE — TELEPHONE ENCOUNTER
Defer to Valerio  
Kidblog message sent to patient  
Patient calls to request a response to her [facility] message today.  Patient does not want to wait until next week for a response from Dr. Valerio.  Patient is requesting another provider address today and get back to her.   
Please see my chart message and advise.  Thanks        Per medication - under rare side effects.    A Type Of Joint Disorder Due To Excess Uric Acid In The Blood Called Gout  
Would say to take 1/2 maxide for a few days and then could stop   Would need to watch her blood pressure - may need a replacement for this medication     Once she is off medication she should see Valerio and have blood pressure check, and uric acid recheck possibly     If she has many episode of gout there is a preventative medication - not usually started with one episode     
Yes

## 2023-04-29 ENCOUNTER — MYC MEDICAL ADVICE (OUTPATIENT)
Dept: INTERNAL MEDICINE | Facility: CLINIC | Age: 73
End: 2023-04-29
Payer: COMMERCIAL

## 2023-04-29 DIAGNOSIS — Z12.83 SKIN EXAM, SCREENING FOR CANCER: Primary | ICD-10-CM

## 2023-05-01 NOTE — TELEPHONE ENCOUNTER
Please review [employer] message and advise.   Routed to covering provider - Dr. Rainey, to review.     Jaz Maciel RN  [facility]

## 2023-09-05 ENCOUNTER — OFFICE VISIT (OUTPATIENT)
Dept: FAMILY MEDICINE | Facility: CLINIC | Age: 73
End: 2023-09-05
Payer: COMMERCIAL

## 2023-09-05 DIAGNOSIS — D18.01 CHERRY ANGIOMA: ICD-10-CM

## 2023-09-05 DIAGNOSIS — Z12.83 SKIN CANCER SCREENING: Primary | ICD-10-CM

## 2023-09-05 DIAGNOSIS — C43.62 MALIGNANT MELANOMA OF SKIN OF UPPER LIMB, INCLUDING SHOULDER, LEFT (H): ICD-10-CM

## 2023-09-05 DIAGNOSIS — C43.61 MALIGNANT MELANOMA OF SKIN OF UPPER LIMB, INCLUDING SHOULDER, RIGHT (H): ICD-10-CM

## 2023-09-05 DIAGNOSIS — D49.2 NEOPLASM OF UNSPECIFIED BEHAVIOR OF BONE, SOFT TISSUE, AND SKIN: ICD-10-CM

## 2023-09-05 DIAGNOSIS — L82.1 SEBORRHEIC KERATOSES: ICD-10-CM

## 2023-09-05 DIAGNOSIS — D22.9 MULTIPLE PIGMENTED NEVI: ICD-10-CM

## 2023-09-05 DIAGNOSIS — L81.4 SOLAR LENTIGINOSIS: ICD-10-CM

## 2023-09-05 DIAGNOSIS — L82.0 INFLAMED SEBORRHEIC KERATOSIS: ICD-10-CM

## 2023-09-05 PROCEDURE — 88341 IMHCHEM/IMCYTCHM EA ADD ANTB: CPT | Performed by: DERMATOLOGY

## 2023-09-05 PROCEDURE — 11103 TANGNTL BX SKIN EA SEP/ADDL: CPT | Mod: XS | Performed by: PHYSICIAN ASSISTANT

## 2023-09-05 PROCEDURE — 88305 TISSUE EXAM BY PATHOLOGIST: CPT | Performed by: DERMATOLOGY

## 2023-09-05 PROCEDURE — 17110 DESTRUCTION B9 LES UP TO 14: CPT | Performed by: PHYSICIAN ASSISTANT

## 2023-09-05 PROCEDURE — 11102 TANGNTL BX SKIN SINGLE LES: CPT | Mod: XS | Performed by: PHYSICIAN ASSISTANT

## 2023-09-05 PROCEDURE — 99203 OFFICE O/P NEW LOW 30 MIN: CPT | Mod: 25 | Performed by: PHYSICIAN ASSISTANT

## 2023-09-05 PROCEDURE — 88342 IMHCHEM/IMCYTCHM 1ST ANTB: CPT | Performed by: DERMATOLOGY

## 2023-09-05 NOTE — LETTER
9/5/2023         RE: Shanelle Sepulveda  [address] [facility] [address]        Dear Colleague,    Thank you for referring your patient, Shanelle Sepulveda, to the [facility] [place]. Please see a copy of my visit note below.    [facility] Dermatology Note  Encounter Date: Sep 5, 2023  Office Visit     Dermatology Problem List:  1. NUB left upper arm and right upper arm  Shave bx today 9/5/23    2. ISK right flank s/p cryotherapy  3.  Skin cancer screening 9/5/2023    ____________________________________________    Assessment & Plan:     # NUB left upper arm and right upper arm, will perform shave bx today to determine management.  -R/o ISK vs pigmented bcc vs amelanotic melanoma vs other  See procedure below.    #Inflamed seborrheic keratosis, right flank  Because this is bothersome, itchy and has been rubbing against her clothing, will treat in the office with cryotherapy  See procedure below.    # Skin cancer screening with multiple benign nevi, solar lentigines  .dgpla  - ABCDEs: Counseled ABCDEs of melanoma: Asymmetry, Border (irregularity), Color (not uniform, changes in color), Diameter (greater than 6 mm which is about the size of a pencil eraser), and Evolving (any changes in preexisting moles).  - Sun protection: Counseled SPF30+ sunscreen, UPF clothing, sun avoidance, tanning bed avoidance.    # Cherry angiomas and seborrheic keratoses,  Benign, reassurance given.        Procedures Performed:   - Shave biopsy procedure note, location(s): Right upper arm and left upper arm. After discussion of benefits and risks including but not limited to bleeding, infection, scar, incomplete removal, recurrence, and non-diagnostic biopsy, written consent and photographs were obtained. The area was cleaned with isopropyl alcohol.  1mL of 1% lidocaine with epinephrine was injected to obtain adequate anesthesia of lesion(s). Shave biopsy at site(s) performed. Hemostasis was  achieved with aluminium chloride. Petrolatum ointment and a sterile dressing were applied. The patient tolerated the procedure and no complications were noted. The patient was provided with verbal and written post care instructions.   - Cryotherapy procedure note, location(s): Right flank. After verbal consent and discussion of risks and benefits including, but not limited to, dyspigmentation/scar, blister, and pain, 1 lesion(s) was(were) treated with 1-2 mm freeze border for 1-2 cycles with liquid nitrogen. Post cryotherapy instructions were provided.      Follow-up: 1 year(s) in-person, or earlier for new or changing lesions    Staff:     All risks, benefits and alternatives were discussed with patient.  Patient is in agreement and understands the assessment and plan.  All questions were answered.  Sun Screen Education was given.   Return to Clinic annually or sooner as needed.   Aline Teague PA-C    ____________________________________________    CC: Skin Check ([facility])    HPI:  Ms. Shanelle Sepulveda is a(n) 72 year old female who presents today as a new patient for a skin check. Last seen in dermatology 2017 with Dr Gill for an inflamed seborrheic keratosis.     Today, she reports she is here for skin check to use to some bothersome growth on her body.  She has noticed a spot on her right upper arm for the last couple years.  Additionally a spot on her right side rubs against her bra and it is itchy often and is very bothersome.  She would like to consider treatment for this.    No personal history of skin cancer.  She has 6 sisters and one of them had a spot removed on her left upper arm but is unsure of the diagnosis.  She is doing well and is healthy.  She needed no further treatment.      She typically stays out of the sun.  She had 1 bad blistering burn in her teen years and burns easily        Patient is otherwise feeling well, without additional skin concerns.     Labs Reviewed:  N/A    Physical  Exam:  Vitals: LMP 10/24/2005   SKIN: Full skin, which includes the head/face, both arms, chest, back, abdomen,both legs, genitalia and/or groin buttocks, digits and/or nails, was examined.  -There is a new 1 cm brown and pink scaly papule on the right upper arm  -There is a 1 cm pink plaque on the left upper arm  - There are dome shaped bright red papules on the trunk and proximal extremities, including a violaceous papule on the right upper lateral thigh.   Multiple regular brown pigmented macules and papules are identified on the trunk and extremities.   Scattered brown macules on sun exposed areas.  There are waxy stuck on tan to brown papules on the face, trunk and extremities..   - There is a tan to brown waxy stuck on papule with surrounding erythema on the right flank. .   - No other lesions of concern on areas examined.             Medications:  Current Outpatient Medications   Medication     ASPIRIN PO     Cholecalciferol (VITAMIN D3) 50 MCG (2000 UT) CAPS     fish oil-omega-3 fatty acids 1000 MG capsule     lisinopril (ZESTRIL) 20 MG tablet     metoprolol succinate ER (TOPROL XL) 200 MG 24 hr tablet     Multiple Vitamins-Minerals (MULTIVITAL PO)     pravastatin (PRAVACHOL) 20 MG tablet     venlafaxine (EFFEXOR XR) 150 MG 24 hr capsule     benzonatate (TESSALON) 200 MG capsule     No current facility-administered medications for this visit.      Past Medical History:   Patient Active Problem List   Diagnosis     Generalized anxiety disorder     Advanced directives, counseling/discussion     Overactive bladder     Atrophic vaginitis     Hyperlipidemia with target LDL less than 130     HTN, goal below 140/90     Mild major depression (H)     Urgency-frequency syndrome     Family history of malignant neoplasm of breast     Cervical cancer screening     Morbid obesity (H)     Major depression in complete remission (H)     Past Medical History:   Diagnosis Date     Intramural leiomyoma of uterus      MENOPAUSAL  DISORDER NOS 8/10/2005    Menses getting much heavier, pelvic sono 02/06--2 uterine fibroids seen, 1.5 and 2.7cm each; simple left ovarian cyst 4.2 cm, 2.6 cm complex right ovarian cyst--endometrial biopsy benign     Symptomatic menopausal or female climacteric states        CC No referring provider defined for this encounter. on close of this encounter.       Again, thank you for allowing me to participate in the care of your patient.        Sincerely,        Aline Teague PA-C

## 2023-09-05 NOTE — PATIENT INSTRUCTIONS
Patient Education       Proper skin care from [facility]:    -Eliminate harsh soaps as they strip the natural oils from the skin, often resulting in dry itchy skin ( i.e. Dial, Zest, Green[place] Spring)  -Use mild soaps such as Cetaphil or Dove Sensitive Skin in the shower. You do not need to use soap on arms, legs, and trunk every time you shower unless visibly soiled.   -Avoid hot or cold showers.  -After showering, lightly dry off and apply moisturizing within 2-3 minutes. This will help trap moisture in the skin.   -Aggressive use of a moisturizer at least 1-2 times a day to the entire body (including -Vanicream, Cetaphil, Aquaphor or Cerave) and moisturize hands after every washing.  -We recommend using moisturizers that come in a tub that needs to be scooped out, not a pump. This has more of an oil base. It will hold moisture in your skin much better than a water base moisturizer. The above recommended are non-pore clogging.      Wear a sunscreen with at least SPF 30 on your face, ears, neck and V of the chest daily. Wear sunscreen on other areas of the body if those areas are exposed to the sun throughout the day. Sunscreens can contain physical and/or chemical blockers. Physical blockers are less likely to clog pores, these include zinc oxide and titanium dioxide. Reapply every two hour and after swimming.     Sunscreen examples: https://www.ewg.org/sunscreen/    UV radiation  UVA radiation remains constant throughout the day and throughout the year. It is a longer wavelength than UVB and therefore penetrates deeper into the skin leading to immediate and delayed tanning, photoaging, and skin cancer. 70-80% of UVA and UVB radiation occurs between the hours of 10am-2pm.  UVB radiation  UVB radiation causes the most harmful effects and is more significant during the summer months. However, snow and ice can reflect UVB radiation leading to skin damage during the winter months as well. UVB radiation is  responsible for tanning, burning, inflammation, delayed erythema (pinkness), pigmentation (brown spots), and skin cancer.     I recommend self monthly full body exams and yearly full body exams with a dermatology provider. If you develop a new or changing lesion please follow up for examination. Most skin cancers are pink and scaly or pink and pearly. However, we do see blue/brown/black skin cancers.  Consider the ABCDEs of melanoma when giving yourself your monthly full body exam ( don't forget the groin, buttocks, feet, toes, etc). A-asymmetry, B-borders, C-color, D-diameter, E-elevation or evolving. If you see any of these changes please follow up in clinic. If you cannot see your back I recommend purchasing a hand held mirror to use with a larger wall mirror.       Checking for Skin Cancer  You can find cancer early by checking your skin each month. There are 3 kinds of skin cancer. They are melanoma, basal cell carcinoma, and squamous cell carcinoma. Doing monthly skin checks is the best way to find new marks or skin changes. Follow the instructions below for checking your skin.   The ABCDEs of checking moles for melanoma   Check your moles or growths for signs of melanoma using ABCDE:   Asymmetry: the sides of the mole or growth don t match  Border: the edges are ragged, notched, or blurred  Color: the color within the mole or growth varies  Diameter: the mole or growth is larger than 6 mm (size of a pencil eraser)  Evolving: the size, shape, or color of the mole or growth is changing (evolving is not shown in the images below)    Checking for other types of skin cancer  Basal cell carcinoma or squamous cell carcinoma have symptoms such as:     A spot or mole that looks different from all other marks on your skin  Changes in how an area feels, such as itching, tenderness, or pain  Changes in the skin's surface, such as oozing, bleeding, or scaliness  A sore that does not heal  New swelling or redness beyond  the border of a mole    Who s at risk?  Anyone can get skin cancer. But you are at greater risk if you have:   Fair skin, light-colored hair, or light-colored eyes  Many moles or abnormal moles on your skin  A history of sunburns from sunlight or tanning beds  A family history of skin cancer  A history of exposure to radiation or chemicals  A weakened immune system  If you have had skin cancer in the past, you are at risk for recurring skin cancer.   How to check your skin  Do your monthly skin checkups in front of a full-length mirror. Check all parts of your body, including your:   Head (ears, face, neck, and scalp)  Torso (front, back, and sides)  Arms (tops, undersides, upper, and lower armpits)  Hands (palms, backs, and fingers, including under the nails)  Buttocks and genitals  Legs (front, back, and sides)  Feet (tops, soles, toes, including under the nails, and between toes)  If you have a lot of moles, take digital photos of them each month. Make sure to take photos both up close and from a distance. These can help you see if any moles change over time.   Most skin changes are not cancer. But if you see any changes in your skin, call your doctor right away. Only he or she can diagnose a problem. If you have skin cancer, seeing your doctor can be the first step toward getting the treatment that could save your life.   [employer] last reviewed this educational content on 4/1/2019 2000-2020 [employer]. [address] [place] [address], [place] [address]. All rights reserved. This information is not intended as a substitute for professional medical care. Always follow your healthcare professional's instructions.       When should I call my doctor?  If you are worsening or not improving, please, contact us or seek urgent care as noted below.     Who should I call with questions (adults)?  [facility] (adult and pediatric): [phone]  [facility]  [place] (adult): [phone]  [facility] ([place], [place], [place] and [place]) [phone]  For urgent needs outside of business hours call the [facility] at [phone] and ask for the dermatology resident on call to be paged  If this is a medical emergency and you are unable to reach an ER, Call 911      If you need a prescription refill, please contact your pharmacy. Refills are approved or denied by our Physicians during normal business hours, Monday through Fridays  Per office policy, refills will not be granted if you have not been seen within the past year (or sooner depending on your child's condition) Wound Care After a Biopsy    What is a skin biopsy?  A skin biopsy allows the doctor to examine a very small piece of tissue under the microscope to determine the diagnosis and the best treatment for the skin condition. A local anesthetic (numbing medicine) is injected with a very small needle into the skin area to be tested. A small piece of skin is taken from the area. Sometimes a suture (stitch) is used.     What are the risks of a skin biopsy?  I will experience scar, bleeding, swelling, pain, crusting and redness. I may experience incomplete removal or recurrence. Risks of this procedure are excessive bleeding, bruising, infection, nerve damage, numbness, thick (hypertrophic or keloidal) scar and non-diagnostic biopsy.    How should I care for my wound for the first 24 hours?  Keep the wound dry and covered for 24 hours  If it bleeds, hold direct pressure on the area for 15 minutes. If bleeding does not stop, call us or go to the emergency room  Avoid strenuous exercise the first 1-2 days or as your doctor instructs you    How should I care for the wound after 24 hours?  After 24 hours, remove the bandage  You may bathe or shower as normal  If you had a scalp biopsy, you can shampoo as usual and can use shower water to clean the biopsy site daily  Clean the  wound once a day with gentle soap and water  Do not scrub, be gentle  Apply white petroleum/Vaseline after cleaning the wound with a cotton swab or a clean finger, and keep the site covered with a Bandaid /bandage. Bandages are not necessary with a scalp biopsy  If you are unable to cover the site with a Bandaid /bandage, re-apply ointment 2-3 times a day to keep the site moist. Moisture will help with healing  Avoid strenuous activity for first 1-2 days  Avoid lakes, rivers, pools, and oceans until the stitches are removed or the site is healed    How do I clean my wound?  Wash hands thoroughly with soap or use hand  before all wound care  Clean the wound with gentle soap and water  Apply white petroleum/Vaseline  to wound after it is clean  Replace the Bandaid /bandage to keep the wound covered for the first few days or as instructed by your doctor  If you had a scalp biopsy, warm shower water to the area on a daily basis should suffice    What should I use to clean my wound?   Cotton-tipped applicators (Qtips )  White petroleum jelly (Vaseline ). Use a clean new container and use Q-tips to apply.  Bandaids  as needed  Gentle soap     How should I care for my wound long term?  Do not get your wound dirty  Keep up with wound care for one week or until the area is healed.    A small scab will form and fall off by itself when the area is completely healed. The area will be red and will become pink in color as it heals. Sun protection is very important for how your scar will turn out. Sunscreen with an SPF 30 or greater is recommended once the area is healed.  You should have some soreness but it should be mild and slowly go away over several days. Talk to your doctor about using tylenol for pain,    When should I call my doctor?  If you have increased:   Pain or swelling  Pus or drainage (clear or slightly yellow drainage is ok)  Temperature over 100F  Spreading redness or warmth around wound    When will I  hear about my results?  The biopsy results can take 2 weeks to come back.  Your results will automatically release to [employer] before your provider has even reviewed them.  The clinic will call you with the results, send you a [employer] message, or have you schedule a follow-up clinic or phone time to discuss the results.  Contact our clinics if you do not hear from us in 2 weeks.    Who should I call with questions?  [facility]: [phone]  [facility]: [phone]  For urgent needs outside of business hours call the [facility] at [phone] and ask for the dermatology resident on call

## 2023-09-05 NOTE — PROGRESS NOTES
[facility] Dermatology Note  Encounter Date: Sep 5, 2023  Office Visit     Dermatology Problem List:  1. NUB left upper arm and right upper arm  Shave bx today 9/5/23    2. ISK right flank s/p cryotherapy  3.  Skin cancer screening 9/5/2023    ____________________________________________    Assessment & Plan:     # NUB left upper arm and right upper arm, will perform shave bx today to determine management.  -R/o ISK vs pigmented bcc vs amelanotic melanoma vs other  See procedure below.    #Inflamed seborrheic keratosis, right flank  Because this is bothersome, itchy and has been rubbing against her clothing, will treat in the office with cryotherapy  See procedure below.    # Skin cancer screening with multiple benign nevi, solar lentigines  .dgpla  - ABCDEs: Counseled ABCDEs of melanoma: Asymmetry, Border (irregularity), Color (not uniform, changes in color), Diameter (greater than 6 mm which is about the size of a pencil eraser), and Evolving (any changes in preexisting moles).  - Sun protection: Counseled SPF30+ sunscreen, UPF clothing, sun avoidance, tanning bed avoidance.    # Cherry angiomas and seborrheic keratoses,  Benign, reassurance given.        Procedures Performed:   - Shave biopsy procedure note, location(s): Right upper arm and left upper arm. After discussion of benefits and risks including but not limited to bleeding, infection, scar, incomplete removal, recurrence, and non-diagnostic biopsy, written consent and photographs were obtained. The area was cleaned with isopropyl alcohol.  1mL of 1% lidocaine with epinephrine was injected to obtain adequate anesthesia of lesion(s). Shave biopsy at site(s) performed. Hemostasis was achieved with aluminium chloride. Petrolatum ointment and a sterile dressing were applied. The patient tolerated the procedure and no complications were noted. The patient was provided with verbal and written post care instructions.   - Cryotherapy procedure  note, location(s): Right flank. After verbal consent and discussion of risks and benefits including, but not limited to, dyspigmentation/scar, blister, and pain, 1 lesion(s) was(were) treated with 1-2 mm freeze border for 1-2 cycles with liquid nitrogen. Post cryotherapy instructions were provided.      Follow-up: 1 year(s) in-person, or earlier for new or changing lesions    Staff:     All risks, benefits and alternatives were discussed with patient.  Patient is in agreement and understands the assessment and plan.  All questions were answered.  Sun Screen Education was given.   Return to Clinic annually or sooner as needed.   Aline Teague PA-C    ____________________________________________    CC: Skin Check ([facility]    HPI:  Ms. Shanelle Sepulveda is a(n) 72 year old female who presents today as a new patient for a skin check. Last seen in dermatology 2017 with Dr Gill for an inflamed seborrheic keratosis.     Today, she reports she is here for skin check to use to some bothersome growth on her body.  She has noticed a spot on her right upper arm for the last couple years.  Additionally a spot on her right side rubs against her bra and it is itchy often and is very bothersome.  She would like to consider treatment for this.    No personal history of skin cancer.  She has 6 sisters and one of them had a spot removed on her left upper arm but is unsure of the diagnosis.  She is doing well and is healthy.  She needed no further treatment.      She typically stays out of the sun.  She had 1 bad blistering burn in her teen years and burns easily        Patient is otherwise feeling well, without additional skin concerns.     Labs Reviewed:  N/A    Physical Exam:  Vitals: LMP 10/24/2005   SKIN: Full skin, which includes the head/face, both arms, chest, back, abdomen,both legs, genitalia and/or groin buttocks, digits and/or nails, was examined.  -There is a new 1 cm brown and pink scaly papule on the right upper  arm  -There is a 1 cm pink plaque on the left upper arm  - There are dome shaped bright red papules on the trunk and proximal extremities, including a violaceous papule on the right upper lateral thigh.   Multiple regular brown pigmented macules and papules are identified on the trunk and extremities.   Scattered brown macules on sun exposed areas.  There are waxy stuck on tan to brown papules on the face, trunk and extremities..   - There is a tan to brown waxy stuck on papule with surrounding erythema on the right flank. .   - No other lesions of concern on areas examined.             Medications:  Current Outpatient Medications   Medication    ASPIRIN PO    Cholecalciferol (VITAMIN D3) 50 MCG (2000 UT) CAPS    fish oil-omega-3 fatty acids 1000 MG capsule    lisinopril (ZESTRIL) 20 MG tablet    metoprolol succinate ER (TOPROL XL) 200 MG 24 hr tablet    Multiple Vitamins-Minerals (MULTIVITAL PO)    pravastatin (PRAVACHOL) 20 MG tablet    venlafaxine (EFFEXOR XR) 150 MG 24 hr capsule    benzonatate (TESSALON) 200 MG capsule     No current facility-administered medications for this visit.      Past Medical History:   Patient Active Problem List   Diagnosis    Generalized anxiety disorder    Advanced directives, counseling/discussion    Overactive bladder    Atrophic vaginitis    Hyperlipidemia with target LDL less than 130    HTN, goal below 140/90    Mild major depression (H)    Urgency-frequency syndrome    Family history of malignant neoplasm of breast    Cervical cancer screening    Morbid obesity (H)    Major depression in complete remission (H)     Past Medical History:   Diagnosis Date    Intramural leiomyoma of uterus     MENOPAUSAL DISORDER NOS 8/10/2005    Menses getting much heavier, pelvic sono 02/06--2 uterine fibroids seen, 1.5 and 2.7cm each; simple left ovarian cyst 4.2 cm, 2.6 cm complex right ovarian cyst--endometrial biopsy benign    Symptomatic menopausal or female climacteric states        CC No  referring provider defined for this encounter. on close of this encounter.

## 2023-09-07 LAB
PATH REPORT.COMMENTS IMP SPEC: ABNORMAL
PATH REPORT.COMMENTS IMP SPEC: YES
PATH REPORT.FINAL DX SPEC: ABNORMAL
PATH REPORT.GROSS SPEC: ABNORMAL
PATH REPORT.MICROSCOPIC SPEC OTHER STN: ABNORMAL
PATH REPORT.RELEVANT HX SPEC: ABNORMAL

## 2023-09-08 ENCOUNTER — MYC MEDICAL ADVICE (OUTPATIENT)
Dept: INTERNAL MEDICINE | Facility: CLINIC | Age: 73
End: 2023-09-08
Payer: COMMERCIAL

## 2023-09-11 ENCOUNTER — PATIENT OUTREACH (OUTPATIENT)
Dept: ONCOLOGY | Facility: CLINIC | Age: 73
End: 2023-09-11
Payer: COMMERCIAL

## 2023-09-11 NOTE — PROGRESS NOTES
New Patient Oncology Nurse Navigator Note     Referring provider: Aline Teague PA-C      Referring Clinic/Organization: [facility]     Referred to (specialty:) Cancer Surgery     Requested provider (if applicable): NA     Date Referral Received: September 8, 2023     Evaluation for:    C43.62 (ICD-10-CM) - 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, including shoulder, left (H)   C43.61 (ICD-10-CM) - 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, including shoulder, right (H)     Per referring provider:    Excison of 1.3mm depth melanoma on the right upper arm. Needs lymph node bx.  She also needs another melanoma on the left upper arm excised.     Clinical History (per Nurse review of records provided):      Patient presented for skin check at dermatologist on 9/5/23. She was last seen in 2017 with Dr. Gill. She has noticed a spot on her right upper arm for the last couple years. Additionally a spot on her right side rubs against her bra and it is itchy often and is very bothersome. She would like to consider treatment for this.     Shave biopsy was done showing:  Final Diagnosis   A(1). R upper arm:  - Malignant melanoma, superficial spreading type, Breslow depth at least 1.3mm, ulcerated, extending to the lateral and deep margins - (see comment and description)      B(2). L upper arm:  - Malignant melanoma, superficial spreading type, Breslow depth 0.5mm, with in situ component extending to the lateral margin - (see comment and description)     Electronically signed by Red Mendez MD on 9/7/2023 at 10:21 AM   Alan GIBBS A re-excision is recommended at this site to ensure complete removal.      Subtype: superficial spreading  Breslow depth: at least 1.3 millimeters  Anatomic (Mario's) level: at least IV  Margins: melanoma in situ extends to the lateral margins; invasive melanoma extends to the deep margin  Mitoses: 2 per mm2  Ulceration:  present  Lymphovascular invasion: absent  Perineural invasion: absent  Microsatellitosis: absent  Tumor infiltrating lymphocytes: present, non-brisk  Regression: absent  Precursor lesion: not identified  AJCC microstage: at least pT2b      B. A re-excision is recommended at this site to ensure complete removal.      Subtype: superficial spreading  Breslow depth: 0.5 millimeters  Anatomic (Mario's) level: III  Margins: melanoma in situ extends to the lateral margin; invasive melanoma appears narrowly excised  Mitoses: 1 per mm2  Ulceration: absent  Lymphovascular invasion: absent  Perineural invasion: absent  Microsatellitosis: absent  Tumor infiltrating lymphocytes: present, non-brisk  Regression: absent  Precursor lesion: not identified  AJCC microstage: pT1a          Records Location: See Bookmarked material     Records Needed: NA     Additional testing needed prior to consult: NA    Payor: [facility] / Plan: [employer] MEDICARE / Product Type: HMO /     September 11, 2023    Called patient to introduced myself and role as nurse navigator with [facility] Hematology/Oncology department and to inform them that we have received the referral for a diagnosis of melanoma from Aline Teague PA-C.   Patient did not answer the phone so a detailed message was left for them including NPS number. Requested callback to speak to a  to set the consult date/time/location. Explained to patient in the message that they will receive a call from our new patient scheduling team (NPS number below, hours are Monday - Friday 8am - 4:30 pm) in the next 1-2 business days to schedule the consultation. Encouraged them to call back with any questions.       Maia Sosa, RN, BSN  [facility] Hematology/Oncology Nurse Navigator  [phone]

## 2023-09-15 NOTE — TELEPHONE ENCOUNTER
RECORDS STATUS - ALL OTHER DIAGNOSIS      RECORDS RECEIVED FROM: Epic    DATE RECEIVED: 9/15   NOTES STATUS DETAILS   OFFICE NOTE from referring provider [facility] Aline Teague PA-C in  SKIN CARE: 9/5/23   MEDICATION LIST [facility]    LABS     PATHOLOGY REPORTS [facility] 9/5/23: Dermatopathology

## 2023-09-21 ENCOUNTER — PRE VISIT (OUTPATIENT)
Dept: ONCOLOGY | Facility: CLINIC | Age: 73
End: 2023-09-21

## 2023-09-21 ENCOUNTER — VIRTUAL VISIT (OUTPATIENT)
Dept: ONCOLOGY | Facility: CLINIC | Age: 73
End: 2023-09-21
Attending: SURGERY
Payer: COMMERCIAL

## 2023-09-21 ENCOUNTER — PATIENT OUTREACH (OUTPATIENT)
Dept: ONCOLOGY | Facility: CLINIC | Age: 73
End: 2023-09-21

## 2023-09-21 VITALS — WEIGHT: 190 LBS | HEIGHT: 64 IN | BODY MASS INDEX: 32.44 KG/M2

## 2023-09-21 DIAGNOSIS — C43.61 MALIGNANT MELANOMA OF SKIN OF UPPER LIMB, INCLUDING SHOULDER, RIGHT (H): ICD-10-CM

## 2023-09-21 DIAGNOSIS — C43.62 MALIGNANT MELANOMA OF SKIN OF UPPER LIMB, INCLUDING SHOULDER, LEFT (H): ICD-10-CM

## 2023-09-21 PROCEDURE — 99203 OFFICE O/P NEW LOW 30 MIN: CPT | Mod: VID | Performed by: SURGERY

## 2023-09-21 ASSESSMENT — PAIN SCALES - GENERAL: PAINLEVEL: NO PAIN (0)

## 2023-09-21 NOTE — PROGRESS NOTES
The patient is a 72-year-old woman with a new diagnosis of melanoma of the right and left arms.  She has noticed a mole in her right arm for approximately 2 years.  She saw a dermatologist who evaluated that arm and also noted a pigmented lesion in the left arm.  Shave biopsies were performed of both lesions.  On the right arm she is found to have a 1.3 mm melanoma superficial spreading with 2 mitoses per millimeter squared tissue.  The margins were positive and ulceration was present.  On the left arm she had a superficial spreading melanoma 0.5 mm in thickness with 1 mitoses per millimeter squared tissue and no ulceration.  She denies any prior history of skin cancer she denies any symptoms of metastatic disease.    Her past medical history is significant for hypertension and being overweight.    Impression bilateral arm melanoma's    Plan I have recommended a wide local excision of the right and left arm melanoma.  I have also recommended a sentinel lymph node biopsy of the right arm melanoma.  I told the lymph node metastasis were present then immunotherapy would be considered.  I will schedule her for bilateral wide local excisions of arm melanoma as and sent lymph node biopsy on the right.    The video started 11:00 and ended at 11:12 AM.  Total time of the visit was 30 minutes which included reviewing her records the video visit and coordinating her care.

## 2023-09-21 NOTE — LETTER
"    9/21/2023         RE: Shanelle Sepulveda  [address] [address]        Dear Colleague,    Thank you for referring your patient, Shanelle Sepulveda, to the [facility]. Please see a copy of my visit note below.    Virtual Visit Details    Type of service:  Video Visit   Video Start Time: {video visit start/end time for provider to select:511417}  Video End Time:{video visit start/end time for provider to select:857687}    Originating Location (pt. Location): {video visit patient location:712508::\"Home\"}  {PROVIDER LOCATION On-site should be selected for visits conducted from your clinic location or adjoining [facility] hospital, academic office, or other nearby [facility] building. Off-site should be selected for all other provider locations, including home:11[address]}  Distant Location (provider location):  {virtual location provider:097782}  Platform used for Video Visit: {Virtual Visit Platforms:481463::\"Hudl\"}    The patient is a 72-year-old woman with a new diagnosis of melanoma of the right and left arms.  She has noticed a mole in her right arm for approximately 2 years.  She saw a dermatologist who evaluated that arm and also noted a pigmented lesion in the left arm.  Shave biopsies were performed of both lesions.  On the right arm she is found to have a 1.3 mm melanoma superficial spreading with 2 mitoses per millimeter squared tissue.  The margins were positive and ulceration was present.  On the left arm she had a superficial spreading melanoma 0.5 mm in thickness with 1 mitoses per millimeter squared tissue and no ulceration.  She denies any prior history of skin cancer she denies any symptoms of metastatic disease.    Her past medical history is significant for hypertension and being overweight.    Impression bilateral arm melanoma's    Plan I have recommended a wide local excision of the right and left arm melanoma.  I have also recommended a sentinel lymph node " biopsy of the right arm melanoma.  I told the lymph node metastasis were present then immunotherapy would be considered.  I will schedule her for bilateral wide local excisions of arm melanoma as and sent lymph node biopsy on the right.    The video started 11:00 and ended at 11:12 AM.  Total time of the visit was 30 minutes which included reviewing her records the video visit and coordinating her care.      Again, thank you for allowing me to participate in the care of your patient.        Sincerely,        Roney Castrejon MD

## 2023-09-21 NOTE — PROGRESS NOTES
[facility]: Surgical Oncology Plan of Care Education Note                                     Discussion with Patient:                                                         Spoke with Mckenzie following her visit with Dr. Castrejon on 9/21/2023. Introduced self and explained role of RNCC.       Plan:                                                       Reviewed plan for wide local excision of right arm, right sentinel lymph node biopsy and left arm wide local excision at the [facility]. Provided appropriate OR location map. Discussed need for H&P within 30 days of surgery. Sera prefers to schedule with her PCP. Reviewed shower instructions and mailed written hand-out and bottle of surgical scrub.     The plan for Lymphoscintigraphy with Nuclear Medicine on the same day as surgery was reviewed with Mckenzie. He/she is aware they will report to Nuclear Medicine prior to their surgery for the injection and scan.      Informed patient they should get a call within the next three business days from our OR  with surgery date, H&P date and date of post-op visit with their surgeon. Writer answered all questions and concerns to the best of her ability and provided her contact information. Reviewed use of [employer] as alternative way to contact team.  Encouraged patient to contact with questions.         Marbella Devi, RNCC  [facility]  Surgical Onolcogy      Approximately 10 minutes was spent in discussion with patient.

## 2023-09-21 NOTE — NURSING NOTE
Is the patient currently in the state of [place]? YES    Visit mode:VIDEO    If the visit is dropped, the patient can be reconnected by: VIDEO VISIT: Send to e-mail at: [email or web address]    Will anyone else be joining the visit? Yes, Pt's  ( Rojelio) is with the pt and will be joining the video visit per pt  (If patient encounters technical issues they should call [phone])    How would you like to obtain your AVS? MyChart    Are changes needed to the allergy or medication list? No    Reason for visit: Consult    No other vitals to report per     Merary NEWBERRY

## 2023-09-26 ENCOUNTER — TELEPHONE (OUTPATIENT)
Dept: ONCOLOGY | Facility: CLINIC | Age: 73
End: 2023-09-26
Payer: COMMERCIAL

## 2023-09-26 PROBLEM — C43.62 MALIGNANT MELANOMA OF SKIN OF UPPER LIMB, INCLUDING SHOULDER, LEFT (H): Status: ACTIVE | Noted: 2023-09-21

## 2023-09-26 PROBLEM — C43.61 MALIGNANT MELANOMA OF SKIN OF UPPER LIMB, INCLUDING SHOULDER, RIGHT (H): Status: ACTIVE | Noted: 2023-09-21

## 2023-09-26 NOTE — TELEPHONE ENCOUNTER
Called and LVM for patient to schedule surgery with Dr. Castrejon. Provided direct call back number [phone].      Ligia Kessler on 09/26/23 at 2:55 AM  Senior Perioperative Coordinator   Ph: [phone]

## 2023-09-27 NOTE — TELEPHONE ENCOUNTER
Scheduled surgery for 10/16 in [place] with Dr. Castrejon.    H&P scheduled 10/2 at [facility].    Post-op scheduled for 11/3 with Dr. Castrejon in [place].    Surgery packet to be mailed.    NM injection and scan scheduled for the morning of surgery. Patient is aware to arrive for NM injection/scan and then travel to [facility] for surgery.    Ligia Kessler on 09/27/23 at 10:49 AM  Senior Perioperative Coordinator   Ph: [phone]

## 2023-10-01 ASSESSMENT — PATIENT HEALTH QUESTIONNAIRE - PHQ9
10. IF YOU CHECKED OFF ANY PROBLEMS, HOW DIFFICULT HAVE THESE PROBLEMS MADE IT FOR YOU TO DO YOUR WORK, TAKE CARE OF THINGS AT HOME, OR GET ALONG WITH OTHER PEOPLE: NOT DIFFICULT AT ALL
SUM OF ALL RESPONSES TO PHQ QUESTIONS 1-9: 1
SUM OF ALL RESPONSES TO PHQ QUESTIONS 1-9: 1

## 2023-10-02 ENCOUNTER — OFFICE VISIT (OUTPATIENT)
Dept: FAMILY MEDICINE | Facility: CLINIC | Age: 73
End: 2023-10-02
Payer: COMMERCIAL

## 2023-10-02 VITALS
DIASTOLIC BLOOD PRESSURE: 70 MMHG | OXYGEN SATURATION: 100 % | WEIGHT: 197 LBS | BODY MASS INDEX: 33.63 KG/M2 | TEMPERATURE: 97.1 F | SYSTOLIC BLOOD PRESSURE: 118 MMHG | RESPIRATION RATE: 16 BRPM | HEART RATE: 73 BPM | HEIGHT: 64 IN

## 2023-10-02 DIAGNOSIS — E66.01 MORBID OBESITY (H): ICD-10-CM

## 2023-10-02 DIAGNOSIS — C43.61 MALIGNANT MELANOMA OF SKIN OF UPPER LIMB, INCLUDING SHOULDER, RIGHT (H): ICD-10-CM

## 2023-10-02 DIAGNOSIS — E78.5 HYPERLIPIDEMIA WITH TARGET LDL LESS THAN 130: ICD-10-CM

## 2023-10-02 DIAGNOSIS — C43.62 MALIGNANT MELANOMA OF SKIN OF UPPER LIMB, INCLUDING SHOULDER, LEFT (H): ICD-10-CM

## 2023-10-02 DIAGNOSIS — F33.42 RECURRENT MAJOR DEPRESSIVE DISORDER, IN FULL REMISSION (H): ICD-10-CM

## 2023-10-02 DIAGNOSIS — Z01.818 PREOP GENERAL PHYSICAL EXAM: Primary | ICD-10-CM

## 2023-10-02 PROCEDURE — 99214 OFFICE O/P EST MOD 30 MIN: CPT | Performed by: FAMILY MEDICINE

## 2023-10-02 NOTE — PROGRESS NOTES
[address] [address]  Phone: [phone]  Primary Provider: Anna Valerio  Pre-op Performing Provider: JAY ADAMS      PREOPERATIVE EVALUATION:  Today's date: 10/2/2023    Mckenzie is a 72 year old female who presents for a preoperative evaluation.      10/2/2023    11:06 AM   Additional Questions   Roomed by Janae MENCHACA CMA       Surgical Information:  Surgery/Procedure: RIGHT ARM WIDE LOCAL EXCISION, RIGHT SENTINEL LYMPH NODE BIOPSY, LEFT ARM WIDE LOCAL EXCISION   Surgery Location: [facility]   Surgeon: Roney Castrejon MD  Surgery Date: 10/16/2023  Time of Surgery: 12:00 PM  Where patient plans to recover: At home with family  Fax number for surgical facility: Note does not need to be faxed, will be available electronically in Epic.    Assessment & Plan     The proposed surgical procedure is considered INTERMEDIATE risk.    Preop general physical exam    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, including shoulder, left (H)    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, including shoulder, right (H)    Morbid obesity (H)    Recurrent major depressive disorder, in full remission (H24)    Hyperlipidemia with target LDL less than 130         - No identified additional risk factors other than previously addressed    Antiplatelet or Anticoagulation Medication Instructions:   - aspirin: Discontinue aspirin 7-10 days prior to procedure to reduce bleeding risk. It should be resumed postoperatively.     Additional Medication Instructions:   - ACE/ARB: HOLD on day of surgery (minimum 11 hours for general anesthesia).   - Beta Blockers: Continue taking on the day of surgery.   - Statins: Continue taking on the day of surgery.    - SSRIs, SNRIs, TCAs, Antipsychotics: Continue without modification.     RECOMMENDATION:  APPROVAL GIVEN to proceed with proposed procedure, without further diagnostic  evaluation.      Subjective       HPI related to upcoming procedure: history of malignant melanoma of right and left upper arms        9/26/2023     8:27 AM   Preop Questions   1. Have you ever had a heart attack or stroke? No   2. Have you ever had surgery on your heart or blood vessels, such as a stent placement, a coronary artery bypass, or surgery on an artery in your head, neck, heart, or legs? No   3. Do you have chest pain with activity? No   4. Do you have a history of  heart failure? No   5. Do you currently have a cold, bronchitis or symptoms of other infection? No   6. Do you have a cough, shortness of breath, or wheezing? No   7. Do you or anyone in your family have previous history of blood clots? No   8. Do you or does anyone in your family have a serious bleeding problem such as prolonged bleeding following surgeries or cuts? No   9. Have you ever had problems with anemia or been told to take iron pills? No   10. Have you had any abnormal blood loss such as black, tarry or bloody stools, or abnormal vaginal bleeding? No   11. Have you ever had a blood transfusion? No   12. Are you willing to have a blood transfusion if it is medically needed before, during, or after your surgery? Yes   13. Have you or any of your relatives ever had problems with anesthesia? UNKNOWN   14. Do you have sleep apnea, excessive snoring or daytime drowsiness? YES - snores loudly   14a. Do you have a CPAP machine? No   15. Do you have any artifical heart valves or other implanted medical devices like a pacemaker, defibrillator, or continuous glucose monitor? No   16. Do you have artificial joints? No   17. Are you allergic to latex? No       Health Care Directive:  Patient has a Health Care Directive on file      Preoperative Review of :   reviewed - no record of controlled substances prescribed.      Status of Chronic Conditions:  See problem list for active medical problems.  Problems all longstanding and stable,  except as noted/documented.  See ROS for pertinent symptoms related to these conditions.    Review of Systems  CONSTITUTIONAL: NEGATIVE for fever, chills, change in weight  INTEGUMENTARY/SKIN: NEGATIVE for worrisome rashes, moles or lesions  EYES: NEGATIVE for vision changes or irritation  ENT/MOUTH: NEGATIVE for ear, mouth and throat problems  RESP: NEGATIVE for significant cough or SOB  CV: NEGATIVE for chest pain, palpitations or peripheral edema  GI: NEGATIVE for nausea, abdominal pain, heartburn, or change in bowel habits  : NEGATIVE for frequency, dysuria, or hematuria  MUSCULOSKELETAL: NEGATIVE for significant arthralgias or myalgia  NEURO: NEGATIVE for weakness, dizziness or paresthesias  ENDOCRINE: NEGATIVE for temperature intolerance, skin/hair changes  HEME: NEGATIVE for bleeding problems  PSYCHIATRIC: NEGATIVE for changes in mood or affect    Patient Active Problem List    Diagnosis Date Noted    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, including shoulder, left (H) 09/21/2023     Priority: Medium    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, including shoulder, right (H) 09/21/2023     Priority: Medium    Major depression in complete remission (H24) 01/18/2022     Priority: Medium    Morbid obesity (H) 08/16/2021     Priority: Medium    Cervical cancer screening 01/09/2018     Priority: Medium     Criteria necessary to stop screening per ASCCP guidelines:  Older than 65 years  Three consecutive negative cytology results or two consecutive negative cotest results within the previous 10 years, with the most recent being performed within the last 5 years.   (Women with hx of EMMETT 2 or 3, or adenocarcinoma in situ should continue screening for full 20 years, even if this goes past age 65).    Lab Results   Component Value Date    PAP NIL / neg HPV 01/02/2018    PAP NIL 04/22/2013    PAP NIL 12/03/2010 1/2018: HM updated, Pap screening discontinued/ck      Family history of malignant neoplasm of breast  "12/08/2015     Priority: Medium    Urgency-frequency syndrome 06/19/2015     Priority: Medium     2011: urinary urgency, frquency and leakage. She has to void every 2 hours, seems to produce normal volumes and feels she empties well; she leakage with stress type, small amounts generally; she also has nocturia, every 2 hours. She has vaginal dryness; given vaginal estrogen cream and Ditropan XL      Mild major depression (H) 06/10/2014     Priority: Medium    HTN, goal below 140/90 03/19/2014     Priority: Medium    Hyperlipidemia with target LDL less than 130 02/12/2014     Priority: Medium     Diagnosis updated by automated process. Provider to review and confirm.      Overactive bladder 11/21/2011     Priority: Medium    Atrophic vaginitis 11/21/2011     Priority: Medium    Advanced directives, counseling/discussion 06/27/2011     Priority: Medium     Patient does not have an Advance/Health Care Directive (HCD), given \"What is Advance Care Planning?\" flyer and requests blank HCD form.    Sera Lawrence  June 27, 2011        Generalized anxiety disorder 12/22/2009     Priority: Medium     Controlled on citalopram since 12/09        Past Medical History:   Diagnosis Date    Intramural leiomyoma of uterus     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, including shoulder, left (H) 9/21/2023    MENOPAUSAL DISORDER NOS 8/10/2005    Menses getting much heavier, pelvic sono 02/06--2 uterine fibroids seen, 1.5 and 2.7cm each; simple left ovarian cyst 4.2 cm, 2.6 cm complex right ovarian cyst--endometrial biopsy benign    Symptomatic menopausal or female climacteric states      Past Surgical History:   Procedure Laterality Date    COLONOSCOPY  02/17/2004    wnl    COLONOSCOPY N/A 6/16/2016    Procedure: COLONOSCOPY;  Surgeon: Lloyd Gutierrez MD;  Location: [facility]    HYSTERECTOMY, VAGINAL  4/18/06    Laparoscopic supracerv hyst-BSO     Current Outpatient Medications   Medication Sig Dispense Refill    ASPIRIN PO Take 81 mg by " "mouth daily      Cholecalciferol (VITAMIN D3) 50 MCG (2000 UT) CAPS       fish oil-omega-3 fatty acids 1000 MG capsule Take 1 capsule (1 g) by mouth 3 times daily 120 capsule 11    lisinopril (ZESTRIL) 20 MG tablet Take 1 tablet (20 mg) by mouth daily 90 tablet 3    metoprolol succinate ER (TOPROL XL) 200 MG 24 hr tablet Take 1 tablet (200 mg) by mouth daily 90 tablet 3    Multiple Vitamins-Minerals (MULTIVITAL PO) Take  by mouth.      pravastatin (PRAVACHOL) 20 MG tablet Take 1 tablet (20 mg) by mouth daily 90 tablet 3    venlafaxine (EFFEXOR XR) 150 MG 24 hr capsule TAKE 1 CAPSULE EVERY DAY 90 capsule 3       Allergies   Allergen Reactions    Atorvastatin Other (See Comments)     Myalgia      Maxzide [Hydrochlorothiazide W-Triamterene]      gout        Social History     Tobacco Use    Smoking status: Never    Smokeless tobacco: Never   Substance Use Topics    Alcohol use: Yes     Comment: rare     Family History   Problem Relation Age of Onset    Hypertension Mother     Cancer Mother         pancreatic CA    Lipids Mother     Cancer Father         liver CA    Depression Father     Depression Sister     Arthritis Sister         left knee replacement    Breast Cancer Sister     Breast Cancer Sister      History   Drug Use No         Objective     /70   Pulse 73   Temp 97.1  F (36.2  C) (Tympanic)   Resp 16   Ht 1.626 m (5' 4\")   Wt 89.4 kg (197 lb)   LMP 10/24/2005   SpO2 100%   BMI 33.81 kg/m      Physical Exam    GENERAL APPEARANCE: healthy, alert and no distress     EYES: EOMI, PERRL     HENT: ear canals and TM's normal and nose and mouth without ulcers or lesions     NECK: no adenopathy, no asymmetry, masses, or scars and thyroid normal to palpation     RESP: lungs clear to auscultation - no rales, rhonchi or wheezes     CV: regular rates and rhythm, normal S1 S2, no S3 or S4 and no murmur, click or rub     MS: extremities normal- no gross deformities noted, no evidence of inflammation in joints, " FROM in all extremities.     SKIN: no suspicious lesions or rashes     NEURO: Normal strength and tone, sensory exam grossly normal, mentation intact and speech normal     PSYCH: mentation appears normal. and affect normal/bright     LYMPHATICS: No cervical adenopathy    Recent Labs   Lab Test 10/19/22  1000 03/28/22  0937 12/26/21  1015   HGB 13.9  --  13.1     --  263    141  --    POTASSIUM 3.8 5.2  --    CR 0.87 0.97  --         Diagnostics:  No labs were ordered during this visit.   No EKG required, no history of coronary heart disease, significant arrhythmia, peripheral arterial disease or other structural heart disease.    Revised Cardiac Risk Index (RCRI):  The patient has the following serious cardiovascular risks for perioperative complications:   - No serious cardiac risks = 0 points     RCRI Interpretation: 0 points: Class I (very low risk - 0.4% complication rate)         Signed Electronically by: Charbel Sage DO  Copy of this evaluation report is provided to requesting physician.

## 2023-10-13 ENCOUNTER — ANESTHESIA EVENT (OUTPATIENT)
Dept: SURGERY | Facility: AMBULATORY SURGERY CENTER | Age: 73
End: 2023-10-13
Payer: COMMERCIAL

## 2023-10-13 ENCOUNTER — PATIENT OUTREACH (OUTPATIENT)
Dept: ONCOLOGY | Facility: CLINIC | Age: 73
End: 2023-10-13
Payer: COMMERCIAL

## 2023-10-13 NOTE — PROGRESS NOTES
[facility]: Surgical Oncology Cancer Care Short Note                                     Discussion with Patient:                                                         OUTBOUND CALL:     Spoke with Mckenzie regarding her surgery plan on 10/16. Informed Mckenzie Dr. Castrejon had a cancellation on 10/16 and her OR time had been moved up. Instructed her to go to her lymphoscintigraphy appointment at 7:30 and then report to the ASC after completion to check in for her surgery.     Reviewed that she may be a late to check in, but Dr. Castrejon is aware and her surgery should proceed without any issues. Mckenzie verbalized understanding.        Marbella Devi, RNCC  [facility]   Surgical Oncology

## 2023-10-16 ENCOUNTER — HOSPITAL ENCOUNTER (OUTPATIENT)
Dept: NUCLEAR MEDICINE | Facility: CLINIC | Age: 73
Setting detail: NUCLEAR MEDICINE
Discharge: HOME | End: 2023-10-16
Attending: SURGERY | Admitting: SURGERY
Payer: COMMERCIAL

## 2023-10-16 ENCOUNTER — ANESTHESIA (OUTPATIENT)
Dept: SURGERY | Facility: AMBULATORY SURGERY CENTER | Age: 73
End: 2023-10-16
Payer: COMMERCIAL

## 2023-10-16 ENCOUNTER — HOSPITAL ENCOUNTER (OUTPATIENT)
Facility: AMBULATORY SURGERY CENTER | Age: 73
Discharge: HOME | End: 2023-10-16
Attending: SURGERY
Payer: COMMERCIAL

## 2023-10-16 VITALS
RESPIRATION RATE: 16 BRPM | SYSTOLIC BLOOD PRESSURE: 148 MMHG | WEIGHT: 190 LBS | HEIGHT: 64 IN | HEART RATE: 70 BPM | TEMPERATURE: 97.4 F | OXYGEN SATURATION: 95 % | BODY MASS INDEX: 32.44 KG/M2 | DIASTOLIC BLOOD PRESSURE: 69 MMHG

## 2023-10-16 DIAGNOSIS — C43.61 MALIGNANT MELANOMA OF SKIN OF UPPER LIMB, INCLUDING SHOULDER, RIGHT (H): ICD-10-CM

## 2023-10-16 DIAGNOSIS — C43.62 MALIGNANT MELANOMA OF SKIN OF UPPER LIMB, INCLUDING SHOULDER, LEFT (H): ICD-10-CM

## 2023-10-16 PROCEDURE — 343N000001 HC RX 343: Performed by: SURGERY

## 2023-10-16 PROCEDURE — 88341 IMHCHEM/IMCYTCHM EA ADD ANTB: CPT | Mod: 26 | Performed by: DERMATOLOGY

## 2023-10-16 PROCEDURE — 11604 EXC TR-EXT MAL+MARG 3.1-4 CM: CPT | Mod: RT | Performed by: SURGERY

## 2023-10-16 PROCEDURE — 38900 IO MAP OF SENT LYMPH NODE: CPT | Mod: RT | Performed by: SURGERY

## 2023-10-16 PROCEDURE — 38500 BIOPSY/REMOVAL LYMPH NODES: CPT | Mod: RT | Performed by: SURGERY

## 2023-10-16 PROCEDURE — 78195 LYMPH SYSTEM IMAGING: CPT

## 2023-10-16 PROCEDURE — 38500 BIOPSY/REMOVAL LYMPH NODES: CPT | Mod: RT

## 2023-10-16 PROCEDURE — 88307 TISSUE EXAM BY PATHOLOGIST: CPT | Mod: TC | Performed by: SURGERY

## 2023-10-16 PROCEDURE — 11604 EXC TR-EXT MAL+MARG 3.1-4 CM: CPT | Mod: 59,LT

## 2023-10-16 PROCEDURE — 12035 INTMD RPR S/A/T/EXT 12.6-20: CPT

## 2023-10-16 PROCEDURE — 88342 IMHCHEM/IMCYTCHM 1ST ANTB: CPT | Mod: 26 | Performed by: DERMATOLOGY

## 2023-10-16 PROCEDURE — A9520 TC99 TILMANOCEPT DIAG 0.5MCI: HCPCS | Performed by: SURGERY

## 2023-10-16 PROCEDURE — 88307 TISSUE EXAM BY PATHOLOGIST: CPT | Mod: 26 | Performed by: DERMATOLOGY

## 2023-10-16 PROCEDURE — 78195 LYMPH SYSTEM IMAGING: CPT | Mod: 26 | Performed by: RADIOLOGY

## 2023-10-16 PROCEDURE — 88305 TISSUE EXAM BY PATHOLOGIST: CPT | Mod: 26 | Performed by: DERMATOLOGY

## 2023-10-16 PROCEDURE — 12035 INTMD RPR S/A/T/EXT 12.6-20: CPT | Mod: 59 | Performed by: SURGERY

## 2023-10-16 RX ORDER — LABETALOL 20 MG/4 ML (5 MG/ML) INTRAVENOUS SYRINGE
PRN
Status: DISCONTINUED | OUTPATIENT
Start: 2023-10-16 | End: 2023-10-16

## 2023-10-16 RX ORDER — ACETAMINOPHEN 325 MG/1
975 TABLET ORAL ONCE
Status: COMPLETED | OUTPATIENT
Start: 2023-10-16 | End: 2023-10-16

## 2023-10-16 RX ORDER — ONDANSETRON 2 MG/ML
INJECTION INTRAMUSCULAR; INTRAVENOUS PRN
Status: DISCONTINUED | OUTPATIENT
Start: 2023-10-16 | End: 2023-10-16

## 2023-10-16 RX ORDER — ISOSULFAN BLUE 50 MG/5ML
INJECTION, SOLUTION SUBCUTANEOUS PRN
Status: DISCONTINUED | OUTPATIENT
Start: 2023-10-16 | End: 2023-10-16 | Stop reason: HOSPADM

## 2023-10-16 RX ORDER — OXYCODONE HYDROCHLORIDE 5 MG/1
10 TABLET ORAL
Status: DISCONTINUED | OUTPATIENT
Start: 2023-10-16 | End: 2023-10-17 | Stop reason: HOSPADM

## 2023-10-16 RX ORDER — ONDANSETRON 2 MG/ML
4 INJECTION INTRAMUSCULAR; INTRAVENOUS EVERY 30 MIN PRN
Status: DISCONTINUED | OUTPATIENT
Start: 2023-10-16 | End: 2023-10-17 | Stop reason: HOSPADM

## 2023-10-16 RX ORDER — FENTANYL CITRATE 50 UG/ML
INJECTION, SOLUTION INTRAMUSCULAR; INTRAVENOUS PRN
Status: DISCONTINUED | OUTPATIENT
Start: 2023-10-16 | End: 2023-10-16

## 2023-10-16 RX ORDER — ENOXAPARIN SODIUM 100 MG/ML
40 INJECTION SUBCUTANEOUS
Status: DISCONTINUED | OUTPATIENT
Start: 2023-10-16 | End: 2023-10-17 | Stop reason: HOSPADM

## 2023-10-16 RX ORDER — PROPOFOL 10 MG/ML
INJECTION, EMULSION INTRAVENOUS PRN
Status: DISCONTINUED | OUTPATIENT
Start: 2023-10-16 | End: 2023-10-16

## 2023-10-16 RX ORDER — ONDANSETRON 4 MG/1
4 TABLET, ORALLY DISINTEGRATING ORAL EVERY 30 MIN PRN
Status: DISCONTINUED | OUTPATIENT
Start: 2023-10-16 | End: 2023-10-17 | Stop reason: HOSPADM

## 2023-10-16 RX ORDER — ACETAMINOPHEN 325 MG/1
975 TABLET ORAL ONCE
Status: DISCONTINUED | OUTPATIENT
Start: 2023-10-16 | End: 2023-10-17 | Stop reason: HOSPADM

## 2023-10-16 RX ORDER — OXYCODONE HYDROCHLORIDE 5 MG/1
5 TABLET ORAL
Status: DISCONTINUED | OUTPATIENT
Start: 2023-10-16 | End: 2023-10-17 | Stop reason: HOSPADM

## 2023-10-16 RX ORDER — CEFAZOLIN SODIUM 2 G/50ML
2 SOLUTION INTRAVENOUS SEE ADMIN INSTRUCTIONS
Status: DISCONTINUED | OUTPATIENT
Start: 2023-10-16 | End: 2023-10-17 | Stop reason: HOSPADM

## 2023-10-16 RX ORDER — SODIUM CHLORIDE, SODIUM LACTATE, POTASSIUM CHLORIDE, CALCIUM CHLORIDE 600; 310; 30; 20 MG/100ML; MG/100ML; MG/100ML; MG/100ML
INJECTION, SOLUTION INTRAVENOUS CONTINUOUS
Status: DISCONTINUED | OUTPATIENT
Start: 2023-10-16 | End: 2023-10-17 | Stop reason: HOSPADM

## 2023-10-16 RX ORDER — PROPOFOL 10 MG/ML
INJECTION, EMULSION INTRAVENOUS CONTINUOUS PRN
Status: DISCONTINUED | OUTPATIENT
Start: 2023-10-16 | End: 2023-10-16

## 2023-10-16 RX ORDER — LIDOCAINE 40 MG/G
CREAM TOPICAL
Status: DISCONTINUED | OUTPATIENT
Start: 2023-10-16 | End: 2023-10-17 | Stop reason: HOSPADM

## 2023-10-16 RX ORDER — CEFAZOLIN SODIUM 2 G/50ML
2 SOLUTION INTRAVENOUS
Status: COMPLETED | OUTPATIENT
Start: 2023-10-16 | End: 2023-10-16

## 2023-10-16 RX ORDER — KETAMINE HYDROCHLORIDE 10 MG/ML
INJECTION INTRAMUSCULAR; INTRAVENOUS PRN
Status: DISCONTINUED | OUTPATIENT
Start: 2023-10-16 | End: 2023-10-16

## 2023-10-16 RX ORDER — LIDOCAINE HYDROCHLORIDE 20 MG/ML
INJECTION, SOLUTION INFILTRATION; PERINEURAL PRN
Status: DISCONTINUED | OUTPATIENT
Start: 2023-10-16 | End: 2023-10-16

## 2023-10-16 RX ORDER — DEXAMETHASONE SODIUM PHOSPHATE 4 MG/ML
INJECTION, SOLUTION INTRA-ARTICULAR; INTRALESIONAL; INTRAMUSCULAR; INTRAVENOUS; SOFT TISSUE PRN
Status: DISCONTINUED | OUTPATIENT
Start: 2023-10-16 | End: 2023-10-16

## 2023-10-16 RX ADMIN — LIDOCAINE HYDROCHLORIDE 80 MG: 20 INJECTION, SOLUTION INFILTRATION; PERINEURAL at 10:16

## 2023-10-16 RX ADMIN — LABETALOL 20 MG/4 ML (5 MG/ML) INTRAVENOUS SYRINGE 10 MG: at 11:05

## 2023-10-16 RX ADMIN — FENTANYL CITRATE 50 MCG: 50 INJECTION, SOLUTION INTRAMUSCULAR; INTRAVENOUS at 10:16

## 2023-10-16 RX ADMIN — ACETAMINOPHEN 975 MG: 325 TABLET ORAL at 09:40

## 2023-10-16 RX ADMIN — PROPOFOL 150 MG: 10 INJECTION, EMULSION INTRAVENOUS at 10:16

## 2023-10-16 RX ADMIN — KETAMINE HYDROCHLORIDE 30 MG: 10 INJECTION INTRAMUSCULAR; INTRAVENOUS at 10:42

## 2023-10-16 RX ADMIN — LABETALOL 20 MG/4 ML (5 MG/ML) INTRAVENOUS SYRINGE 10 MG: at 10:51

## 2023-10-16 RX ADMIN — PROPOFOL 200 MCG/KG/MIN: 10 INJECTION, EMULSION INTRAVENOUS at 10:18

## 2023-10-16 RX ADMIN — DEXAMETHASONE SODIUM PHOSPHATE 4 MG: 4 INJECTION, SOLUTION INTRA-ARTICULAR; INTRALESIONAL; INTRAMUSCULAR; INTRAVENOUS; SOFT TISSUE at 10:20

## 2023-10-16 RX ADMIN — ONDANSETRON 4 MG: 2 INJECTION INTRAMUSCULAR; INTRAVENOUS at 11:05

## 2023-10-16 RX ADMIN — SODIUM CHLORIDE, SODIUM LACTATE, POTASSIUM CHLORIDE, CALCIUM CHLORIDE: 600; 310; 30; 20 INJECTION, SOLUTION INTRAVENOUS at 09:00

## 2023-10-16 RX ADMIN — CEFAZOLIN SODIUM 2 G: 2 SOLUTION INTRAVENOUS at 10:04

## 2023-10-16 RX ADMIN — PROPOFOL 150 MCG/KG/MIN: 10 INJECTION, EMULSION INTRAVENOUS at 11:02

## 2023-10-16 RX ADMIN — FENTANYL CITRATE 50 MCG: 50 INJECTION, SOLUTION INTRAMUSCULAR; INTRAVENOUS at 11:02

## 2023-10-16 RX ADMIN — TILMANOCEPT 0.5 MILLICURIE: KIT at 07:46

## 2023-10-16 NOTE — DISCHARGE INSTRUCTIONS
"[facility]  Home Care Following Anesthesia  For 24 hours after surgery:  Get plenty of rest.  A responsible adult must stay with you for at least 24 hours after you leave the surgery center.  Do not drive or use heavy equipment.  If you have weakness or tingling, don't drive or use heavy equipment until this feeling goes away.   Do not drink alcohol.   Avoid strenuous or risky activities.  Ask for help when climbing stairs.  You may feel lightheaded.  IF so, sit for a few minutes before standing.  Have someone help you get up.   If you have nausea (feel sick to your stomach): Drink only clear liquids such as apple juice, ginger ale, broth or 7-Up.  Rest may also help.  Be sure to drink enough fluids.  Move to a regular diet as you feel able.   You may have a slight fever.  Call the doctor if your fever is over 100 F (37.7 C) (taken under the tongue) or lasts longer than 24 hours.  You may have a dry mouth, a sore throat, muscle aches or trouble sleeping. These should go away after 24 hours.  Do not make important or legal decisions.   It is recommended to avoid smoking.        Today you received a Marcaine or bupivacaine block to numb the nerves near your surgery site.  This is a block using local anesthetic or \"numbing\" medication injected around the nerves to anesthetize or \"numb\" the area supplied by those nerves.  This block is injected into the muscle layer near your surgical site.  The medication may numb the location where you had surgery for 6-18 hours, but may last up to 24 hours.  If your surgical site is an arm or leg you should be careful with your affected limb, since it is possible to injure your limb without being aware of it due to the numbing.  Until full feeling returns, you should guard against bumping or hitting your limb, and avoid extreme hot or cold temperatures on the skin.  As the block wears off, the feeling will return as a tingling or prickly " sensation near your surgical site.  You will experience more discomfort from your incision as the feeling returns.  You may want to take a pain pill (a narcotic or Tylenol if this was prescribed by your surgeon) when you start to experience mild pain before the pain beccomes more severe.  If your pain medications do not control your pain you should notifiy your surgeon.    Tips for taking pain medications  To get the best pain relief possible, remember these points:  Take pain medications as directed, before pain becomes severe.  Pain medication can upset your stomach: taking it with food may help.  Constipation is a common side effect of pain medication. Drink plenty of  fluids.  Eat foods high in fiber. Take a stool softener if recommended by your doctor or pharmacist.  Do not drink alcohol, drive or operate machinery while taking pain medications.  Ask about other ways to control pain, such as with heat, ice or relaxation.    Tylenol/Acetaminophen Consumption    If you feel your pain relief is insufficient, you may take Tylenol/Acetaminophen in addition to your narcotic pain medication.   Be careful not to exceed 4,000 mg of Tylenol/Acetaminophen in a 24 hour period from all sources.  If you are taking extra strength Tylenol/acetaminophen (500 mg), the maximum dose is 8 tablets in 24 hours.  If you are taking regular strength acetaminophen (325 mg), the maximum dose is 12 tablets in 24 hours.  You received Tylenol at 0940 AM, you may take this medication again at 340 PM as needed for pain.     Call a doctor for any of the following:  Signs of infection (fever, growing tenderness at the surgery site, a large amount of drainage or bleeding, severe pain, foul-smelling drainage, redness, swelling).  It has been over 8 to 10 hours since surgery and you are still not able to urinate (pass water).  Headache for over 24 hours.  Numbness, tingling or weakness the day after surgery (if you had spinal anesthesia).  Signs of  Covid-19 infection (temperature over 100 degrees, shortness of breath, cough, loss of taste/smell, generalized body aches, persistent headache, chills, sore throat, nausea/vomiting/diarrhea)  Your doctor is:       Dr. Roney Castrejon, [facility]: [phone]               Or dial [phone] and ask for the resident on call for:  [facility]  For emergency care, call the:  [facility]:  [phone] (TTY for hearing impaired: [phone])

## 2023-10-16 NOTE — ANESTHESIA PREPROCEDURE EVALUATION
Anesthesia Pre-Procedure Evaluation    Patient: Shanelle Sepulveda   MRN: [id number] : 1950        Procedure : Procedure(s):  RIGHT ARM WIDE LOCAL EXCISION  RIGHT SENTINEL LYMPH NODE BIOPSY  LEFT ARM WIDE LOCAL EXCISION          Past Medical History:   Diagnosis Date    Intramural leiomyoma of uterus     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, including shoulder, left (H) 2023    MENOPAUSAL DISORDER NOS 8/10/2005    Menses getting much heavier, pelvic sono --2 uterine fibroids seen, 1.5 and 2.7cm each; simple left ovarian cyst 4.2 cm, 2.6 cm complex right ovarian cyst--endometrial biopsy benign    Symptomatic menopausal or female climacteric states       Past Surgical History:   Procedure Laterality Date    COLONOSCOPY  2004    wnl    COLONOSCOPY N/A 2016    Procedure: COLONOSCOPY;  Surgeon: Lloyd Gutierrez MD;  Location: [facility]    HYSTERECTOMY, VAGINAL  06    Laparoscopic supracerv hyst-BSO      Allergies   Allergen Reactions    Atorvastatin Other (See Comments)     Myalgia      Maxzide [Hydrochlorothiazide W-Triamterene]      gout      Social History     Tobacco Use    Smoking status: Never    Smokeless tobacco: Never   Substance Use Topics    Alcohol use: Yes     Comment: rare      Wt Readings from Last 1 Encounters:   10/16/23 86.2 kg (190 lb)        Anesthesia Evaluation   Pt has had prior anesthetic. Type: General and MAC.    No history of anesthetic complications       ROS/MED HX  ENT/Pulmonary:  - neg pulmonary ROS     Neurologic:  - neg neurologic ROS     Cardiovascular:     (+)  hypertension- -   -  - -                                      METS/Exercise Tolerance: >4 METS    Hematologic:  - neg hematologic  ROS     Musculoskeletal:  - neg musculoskeletal ROS     GI/Hepatic:  - neg GI/hepatic ROS     Renal/Genitourinary:  - neg Renal ROS     Endo:  - neg endo ROS   (+)               Obesity,       Psychiatric/Substance Use:  - neg psychiatric ROS     Infectious Disease:  -  "neg infectious disease ROS     Malignancy:   (+) Malignancy, History of Breast.    Other:  - neg other ROS          Physical Exam    Airway        Mallampati: II   TM distance: > 3 FB   Neck ROM: full   Mouth opening: > 3 cm    Respiratory Devices and Support         Dental       (+) Completely normal teeth      Cardiovascular   cardiovascular exam normal          Pulmonary   pulmonary exam normal                OUTSIDE LABS:  CBC:   Lab Results   Component Value Date    WBC 5.7 10/19/2022    WBC 7.4 12/26/2021    HGB 13.9 10/19/2022    HGB 13.1 12/26/2021    HCT 42.5 10/19/2022    HCT 40.9 12/26/2021     10/19/2022     12/26/2021     BMP:   Lab Results   Component Value Date     10/19/2022     03/28/2022    POTASSIUM 3.8 10/19/2022    POTASSIUM 5.2 03/28/2022    CHLORIDE 101 10/19/2022    CHLORIDE 108 03/28/2022    CO2 29 10/19/2022    CO2 31 03/28/2022    BUN 17 10/19/2022    BUN 24 03/28/2022    CR 0.87 10/19/2022    CR 0.97 03/28/2022     (H) 10/19/2022     (H) 03/28/2022     COAGS: No results found for: \"PTT\", \"INR\", \"FIBR\"  POC:   Lab Results   Component Value Date    HCG Negative 04/17/2006     HEPATIC:   Lab Results   Component Value Date    ALBUMIN 3.8 10/19/2022    PROTTOTAL 7.8 10/19/2022    ALT 41 10/19/2022    AST 26 10/19/2022    ALKPHOS 78 10/19/2022    BILITOTAL 0.4 10/19/2022     OTHER:   Lab Results   Component Value Date    A1C 5.8 (H) 08/16/2021    ALFREDA 9.8 10/19/2022    LIPASE 107 03/21/2009    AMYLASE 47 03/21/2009    TSH 0.84 10/19/2022       Anesthesia Plan    ASA Status:  3    NPO Status:  NPO Appropriate    Anesthesia Type: General.     - Airway: LMA   Induction: Intravenous.   Maintenance: TIVA.        Consents    Anesthesia Plan(s) and associated risks, benefits, and realistic alternatives discussed. Questions answered and patient/representative(s) expressed understanding.     - Discussed: Risks, Benefits and Alternatives for BOTH SEDATION and the " PROCEDURE were discussed     - Discussed with:  Patient            Postoperative Care    Pain management: IV analgesics.   PONV prophylaxis: Ondansetron (or other 5HT-3)     Comments:                Geoff Amor MD

## 2023-10-16 NOTE — ANESTHESIA POSTPROCEDURE EVALUATION
Patient: Shanelle Sepulveda    Procedure: Procedure(s):  RIGHT ARM WIDE LOCAL EXCISION  RIGHT SENTINEL LYMPH NODE BIOPSY  LEFT ARM WIDE LOCAL EXCISION       Anesthesia Type:  General    Note:  Disposition: Outpatient   Postop Pain Control: Uneventful            Sign Out: Well controlled pain   PONV: No   Neuro/Psych: Uneventful            Sign Out: Acceptable/Baseline neuro status   Airway/Respiratory: Uneventful            Sign Out: Acceptable/Baseline resp. status   CV/Hemodynamics: Uneventful            Sign Out: Acceptable CV status; No obvious hypovolemia; No obvious fluid overload   Other NRE: NONE   DID A NON-ROUTINE EVENT OCCUR? No           Last vitals:  Vitals Value Taken Time   /69 10/16/23 1226   Temp 36.4  C (97.6  F) 10/16/23 1226   Pulse 72 10/16/23 1230   Resp 16 10/16/23 1230   SpO2 91 % 10/16/23 1230   Vitals shown include unfiled device data.    Electronically Signed By: Geoff Amor MD  October 16, 2023  1:30 PM

## 2023-10-16 NOTE — OP NOTE
Preoperative diagnosis: Melanoma of the right arm, melanoma of the left arm    Postoperative diagnosis: Same    PROCEDURE NOTE: Wide local excision of right arm melanoma, wide local excision of left arm melanoma, lymphatic mapping, and right axillary sentinel lymph node biopsy.    Surgeons: Dr. Roney Castrejon and Dr. Clyde Ott    Anesthesia: General    Indications for surgery: The patient is a 72-year-old woman who was diagnosed with a 1.3 mm melanoma of her right arm and a 0.5 mm melanoma of her left arm.  She underwent lymphoscintigraphy of her right arm melanoma which revealed uptake to the right axilla.  She now presents for surgical treatment.    Procedure: After informed consent the patient was brought the operating room given a general anesthetic.  I injected isosulfan blue intradermally around her melanoma of the right arm she was then prepped and draped in the usual fashion.  On the left side I drew out a 1 cm margin of resection around the left arm melanoma.  The elliptical incision was oriented transversely.  A local anesthetic was administered.  The elliptical skin incision was performed with a scalpel.  The Bovie cautery was used to incise subcutaneous tissues.  The dissection went down to the underlying fascia which was left intact the specimen was removed oriented and sent to pathology.  Strict hemostasis was obtained with the Bovie cautery.  The size of the defect was 6 x 3.5 cm.  The incision was closed in layers with interrupted 3-0 Vicryl suture for the dermal layer and a running 4-0 PDS subcuticular stitch for the skin I placed 3-0 nylon sutures in a horizontal mattress fashion to buttress the incision.  Mj out a 1 cm margin of resection around the melanoma of her right arm.  A local anesthetic was administered and an elliptical skin incision was made with a scalpel.  The Bovie cautery was used to incise the subcutaneous tissues down to the underlying fascia the specimen was removed oriented  and sent to pathology.  Strict hemostasis was obtained with the Bovie cautery and with surgical clips.  The size of the defect was 9.5 x 3.5 cm.  The incision was closed in layers with interrupted 3-0 Vicryl suture for the dermal layer and a running 4-0 PDS subcuticular stitch for the skin.  I placed 3-0 nylon sutures in a horizontal mattress fashion.  Next identified a transcutaneous hotspot in the right axilla.  A local anesthetic was administered and a transverse axillary incision was made with a scalpel.  The Bovie cautery was used to incise the subcutaneous tissues.  I dissected down to identify a radioactive lymph node.  The radioactive lymph node was not blue.  The radioactive lymph node was completely removed and had ex vivo counts of 500 counts per second.  After removal of this lymph node there were no additional radioactive blue or palpable lymph nodes.  Strict hemostasis was obtained with the Bovie cautery and with surgical clips.  The dermis was closed with interrupted 3-0 Vicryl suture and the skin was closed with a running 4-0 PDS subcuticular stitch Dermabond was placed and the patient was taken to the recovery room.

## 2023-10-16 NOTE — ANESTHESIA CARE TRANSFER NOTE
Patient: Shanelle Sepulveda    Procedure: Procedure(s):  RIGHT ARM WIDE LOCAL EXCISION  RIGHT SENTINEL LYMPH NODE BIOPSY  LEFT ARM WIDE LOCAL EXCISION       Diagnosis: 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, including shoulder, left (H) [C43.62]  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, including shoulder, right (H) [C43.61]  Diagnosis Additional Information: No value filed.    Anesthesia Type:   General     Note:    Oropharynx: oropharynx clear of all foreign objects and spontaneously breathing  Level of Consciousness: awake and drowsy  Oxygen Supplementation: face mask  Level of Supplemental Oxygen (L/min / FiO2): 6    Dentition: dentition unchanged  Vital Signs Stable: post-procedure vital signs reviewed and stable  Report to RN Given: handoff report given  Patient transferred to: PACU    Handoff Report: Identifed the Patient, Identified the Reponsible Provider, Reviewed the pertinent medical history, Discussed the surgical course, Reviewed Intra-OP anesthesia mangement and issues during anesthesia, Set expectations for post-procedure period and Allowed opportunity for questions and acknowledgement of understanding    Vitals:  Vitals Value Taken Time   /79 10/16/23 1208   Temp 36.2  C (97.1  F) 10/16/23 1208   Pulse 71 10/16/23 1208   Resp 6 10/16/23 1208   SpO2 95 % 10/16/23 1208   Vitals shown include unfiled device data.    Electronically Signed By: RICK Landrum CRNA  October 16, 2023  12:10 PM  
Normal

## 2023-10-16 NOTE — ANESTHESIA PROCEDURE NOTES
Airway       Patient location during procedure: OR  Staff -        CRNA: So Landers APRN CRNA       Performed By: CRNA  Consent for Airway        Urgency: elective  Indications and Patient Condition       Indications for airway management: rafi-procedural       Induction type:intravenous       Mask difficulty assessment: 2 - vent by mask + OA or adjuvant +/- NMBA    Final Airway Details       Final airway type: supraglottic airway    Supraglottic Airway Details        Type: LMA       Brand: LMA Unique       LMA size: 4    Post intubation assessment        Placement verified by: capnometry and chest rise        Number of attempts at approach: 1       Number of other approaches attempted: 0       Secured with: plastic tape       Ease of procedure: easy       Dentition: Intact and Unchanged

## 2023-10-18 ENCOUNTER — PATIENT OUTREACH (OUTPATIENT)
Dept: ONCOLOGY | Facility: CLINIC | Age: 73
End: 2023-10-18
Payer: COMMERCIAL

## 2023-10-18 NOTE — PROGRESS NOTES
[facility]: Surgical Oncology Post-op Call Note                                     Discussion with Patient                                                           Surgery:      Wide local excision of right arm melanoma, wide local excision of left arm melanoma, lymphatic mapping, and right axillary sentinel lymph node biopsy.      Surgery date: 10/16/2023     Discharge Date:  10/16/2023    Date of Post-op Call:  10/18/2023       Immediate Concerns:          Pain:  No concerns with pain management. Minimal pain. Taking Tylenol and dilaudid PRN.   Using pain medications as recommended with appropriate relief.      Incision:   No concerns, healing well, no redness, drainage or edema reported. Left incision is slightly more red than right incision. She will continue to monitor and call us with any new or additional concerns.      Activity:   No difficulty with ADLs reported.   Patient is up independently at home.   Encourage patient to continue to stay active.        Post op/follow up plans:      Post op appointment scheduled,confirmed date and time with patient. Direct contact number provided for any additional questions or concerns.     Future Appointments   Date Time Provider Department Center   11/3/2023 10:00 AM Roney Castrejon MD [facility]   11/7/2023  3:30 PM RV MA/LPN [facility] RV   12/11/2023  2:30 PM Anna Valerio MD [facility]   9/3/2024  7:30 AM Aline Teague PA-C [facility]         Marbella Devi RNCC  [facility]  Surgical Oncology       Approximately 10 min was spent in conversation with the patient.

## 2023-10-27 ENCOUNTER — ONCOLOGY VISIT (OUTPATIENT)
Dept: ONCOLOGY | Facility: CLINIC | Age: 73
End: 2023-10-27
Attending: ORTHOPAEDIC SURGERY
Payer: COMMERCIAL

## 2023-10-27 VITALS
TEMPERATURE: 98.1 F | HEART RATE: 64 BPM | SYSTOLIC BLOOD PRESSURE: 160 MMHG | WEIGHT: 199 LBS | BODY MASS INDEX: 34.16 KG/M2 | OXYGEN SATURATION: 96 % | DIASTOLIC BLOOD PRESSURE: 98 MMHG

## 2023-10-27 DIAGNOSIS — C43.62 MALIGNANT MELANOMA OF SKIN OF UPPER LIMB, INCLUDING SHOULDER, LEFT (H): Primary | ICD-10-CM

## 2023-10-27 ASSESSMENT — PAIN SCALES - GENERAL: PAINLEVEL: NO PAIN (0)

## 2023-10-27 NOTE — PROGRESS NOTES
Patient is here for a postoperative visit after undergoing bilateral wide local excisions of both arms and a right axillary sent lymph node biopsy on October 16.  She is doing well since her surgery with no postoperative problems.  On physical examination all of her incisions are healing well.  Her final pathology report is not formally back.  However I did receive an email from the pathologist that both melanomas were completely excised that her sentinel node biopsy was negative.  I have informed Mckenzie of this news.  I have informed her that she needs no additional surgery or imaging.  I have recommended close follow-up with her dermatologist.  I will see in the future if any problems arise

## 2023-10-27 NOTE — LETTER
10/27/2023         RE: Shanelle Sepulveda  [address] [address]        Dear Colleague,    Thank you for referring your patient, Shanelle Sepulveda, to the [facility]. Please see a copy of my visit note below.    Patient is here for a postoperative visit after undergoing bilateral wide local excisions of both arms and a right axillary sent lymph node biopsy on October 16.  She is doing well since her surgery with no postoperative problems.  On physical examination all of her incisions are healing well.  Her final pathology report is not formally back.  However I did receive an email from the pathologist that both melanomas were completely excised that her sentinel node biopsy was negative.  I have informed Mckenzie of this news.  I have informed her that she needs no additional surgery or imaging.  I have recommended close follow-up with her dermatologist.  I will see in the future if any problems arise    Sincerely,        Roney Castrejon MD

## 2023-10-27 NOTE — NURSING NOTE
"Oncology Rooming Note    October 27, 2023 9:16 AM   Shanelle Sepulveda is a 72 year old female who presents for:    Chief Complaint   Patient presents with    Oncology Clinic Visit     Post op 10/16     Initial Vitals: BP (!) 160/98   Pulse 64   Temp 98.1  F (36.7  C)   Wt 90.3 kg (199 lb)   LMP 10/24/2005   SpO2 96%   BMI 34.16 kg/m   Estimated body mass index is 34.16 kg/m  as calculated from the following:    Height as of 10/16/23: 1.626 m (5' 4\").    Weight as of this encounter: 90.3 kg (199 lb). Body surface area is 2.02 meters squared.  No Pain (0) Comment: Data Unavailable   Patient's last menstrual period was 10/24/2005.  Allergies reviewed: Yes  Medications reviewed: Yes    Medications: Medication refills not needed today.  Pharmacy name entered into EPIC:    [facility] [place]  [facility] - NON-EPRESCRIBE  [facility] - [facility] [place] - [address] [place] [address] [id number] - [place], [place] - [address]  AT [facility] [address] [facility] [place] - [address]    Clinical concerns:        Merary Arceo              "

## 2023-10-30 LAB
PATH REPORT.COMMENTS IMP SPEC: ABNORMAL
PATH REPORT.COMMENTS IMP SPEC: YES
PATH REPORT.FINAL DX SPEC: ABNORMAL
PATH REPORT.GROSS SPEC: ABNORMAL
PATH REPORT.MICROSCOPIC SPEC OTHER STN: ABNORMAL
PATH REPORT.RELEVANT HX SPEC: ABNORMAL
PHOTO IMAGE: ABNORMAL

## 2023-10-30 PROCEDURE — 88342 IMHCHEM/IMCYTCHM 1ST ANTB: CPT | Mod: 26 | Performed by: DERMATOLOGY

## 2023-10-30 PROCEDURE — 88342 IMHCHEM/IMCYTCHM 1ST ANTB: CPT | Mod: TC | Performed by: SURGERY

## 2023-11-07 ENCOUNTER — IMMUNIZATION (OUTPATIENT)
Dept: FAMILY MEDICINE | Facility: CLINIC | Age: 73
End: 2023-11-07
Payer: COMMERCIAL

## 2023-11-07 DIAGNOSIS — Z29.11 NEED FOR VACCINATION AGAINST RESPIRATORY SYNCYTIAL VIRUS: ICD-10-CM

## 2023-11-07 DIAGNOSIS — Z23 ENCOUNTER FOR IMMUNIZATION: Primary | ICD-10-CM

## 2023-11-07 PROCEDURE — 90662 IIV NO PRSV INCREASED AG IM: CPT

## 2023-11-07 PROCEDURE — G0008 ADMIN INFLUENZA VIRUS VAC: HCPCS

## 2023-11-07 NOTE — PROGRESS NOTES
Prior to immunization administration, verified patients identity using patient s name and date of birth. Please see Immunization Activity for additional information.     Screening Questionnaire for Adult Immunization    Are you sick today?   No   Do you have allergies to medications, food, a vaccine component or latex?   No   Have you ever had a serious reaction after receiving a vaccination?   No   Do you have a long-term health problem with heart, lung, kidney, or metabolic disease (e.g., diabetes), asthma, a blood disorder, no spleen, complement component deficiency, a cochlear implant, or a spinal fluid leak?  Are you on long-term aspirin therapy?   No   Do you have cancer, leukemia, HIV/AIDS, or any other immune system problem?   No   Do you have a parent, brother, or sister with an immune system problem?   No   In the past 3 months, have you taken medications that affect  your immune system, such as prednisone, other steroids, or anticancer drugs; drugs for the treatment of rheumatoid arthritis, Crohn s disease, or psoriasis; or have you had radiation treatments?   No   Have you had a seizure, or a brain or other nervous system problem?   No   During the past year, have you received a transfusion of blood or blood    products, or been given immune (gamma) globulin or antiviral drug?   No   For women: Are you pregnant or is there a chance you could become       pregnant during the next month?   No   Have you received any vaccinations in the past 4 weeks?   No     Immunization questionnaire answers were all negative.    I have reviewed the following standing orders:   This patient is due and qualifies for the Influenza vaccine.    Click here for Influenza Vaccine Standing Order    I have reviewed the vaccines inclusion and exclusion criteria; No concerns regarding eligibility.     Patient instructed to remain in clinic for 15 minutes afterwards, and to report any adverse reactions.     Screening performed by  Gavino Coleman MA on 11/7/2023 at 3:28 PM.

## 2023-11-19 ENCOUNTER — HEALTH MAINTENANCE LETTER (OUTPATIENT)
Age: 73
End: 2023-11-19

## 2023-12-11 ENCOUNTER — OFFICE VISIT (OUTPATIENT)
Dept: INTERNAL MEDICINE | Facility: CLINIC | Age: 73
End: 2023-12-11
Payer: COMMERCIAL

## 2023-12-11 VITALS
HEIGHT: 64 IN | WEIGHT: 197 LBS | DIASTOLIC BLOOD PRESSURE: 92 MMHG | SYSTOLIC BLOOD PRESSURE: 172 MMHG | BODY MASS INDEX: 33.63 KG/M2 | HEART RATE: 78 BPM | OXYGEN SATURATION: 95 % | TEMPERATURE: 97 F | RESPIRATION RATE: 20 BRPM

## 2023-12-11 DIAGNOSIS — Z00.00 PREVENTATIVE HEALTH CARE: Primary | ICD-10-CM

## 2023-12-11 DIAGNOSIS — I10 ESSENTIAL HYPERTENSION, BENIGN: ICD-10-CM

## 2023-12-11 DIAGNOSIS — E78.5 HYPERLIPIDEMIA WITH TARGET LDL LESS THAN 130: ICD-10-CM

## 2023-12-11 DIAGNOSIS — F33.42 RECURRENT MAJOR DEPRESSIVE DISORDER, IN FULL REMISSION (H): ICD-10-CM

## 2023-12-11 DIAGNOSIS — E78.5 HYPERLIPIDEMIA LDL GOAL <130: ICD-10-CM

## 2023-12-11 DIAGNOSIS — G47.10 EXCESSIVE SLEEPINESS: ICD-10-CM

## 2023-12-11 LAB
BASOPHILS # BLD AUTO: 0 10E3/UL (ref 0–0.2)
BASOPHILS NFR BLD AUTO: 0 %
EOSINOPHIL # BLD AUTO: 0 10E3/UL (ref 0–0.7)
EOSINOPHIL NFR BLD AUTO: 0 %
ERYTHROCYTE [DISTWIDTH] IN BLOOD BY AUTOMATED COUNT: 12.6 % (ref 10–15)
HCT VFR BLD AUTO: 42.9 % (ref 35–47)
HGB BLD-MCNC: 14.5 G/DL (ref 11.7–15.7)
IMM GRANULOCYTES # BLD: 0 10E3/UL
IMM GRANULOCYTES NFR BLD: 0 %
LYMPHOCYTES # BLD AUTO: 1.5 10E3/UL (ref 0.8–5.3)
LYMPHOCYTES NFR BLD AUTO: 31 %
MCH RBC QN AUTO: 31.5 PG (ref 26.5–33)
MCHC RBC AUTO-ENTMCNC: 33.8 G/DL (ref 31.5–36.5)
MCV RBC AUTO: 93 FL (ref 78–100)
MONOCYTES # BLD AUTO: 0.5 10E3/UL (ref 0–1.3)
MONOCYTES NFR BLD AUTO: 10 %
NEUTROPHILS # BLD AUTO: 2.7 10E3/UL (ref 1.6–8.3)
NEUTROPHILS NFR BLD AUTO: 59 %
PLATELET # BLD AUTO: 233 10E3/UL (ref 150–450)
RBC # BLD AUTO: 4.61 10E6/UL (ref 3.8–5.2)
WBC # BLD AUTO: 4.7 10E3/UL (ref 4–11)

## 2023-12-11 PROCEDURE — 80061 LIPID PANEL: CPT | Performed by: INTERNAL MEDICINE

## 2023-12-11 PROCEDURE — 84443 ASSAY THYROID STIM HORMONE: CPT | Performed by: INTERNAL MEDICINE

## 2023-12-11 PROCEDURE — 36415 COLL VENOUS BLD VENIPUNCTURE: CPT | Performed by: INTERNAL MEDICINE

## 2023-12-11 PROCEDURE — G0439 PPPS, SUBSEQ VISIT: HCPCS | Performed by: INTERNAL MEDICINE

## 2023-12-11 PROCEDURE — 99214 OFFICE O/P EST MOD 30 MIN: CPT | Mod: 25 | Performed by: INTERNAL MEDICINE

## 2023-12-11 PROCEDURE — 80053 COMPREHEN METABOLIC PANEL: CPT | Performed by: INTERNAL MEDICINE

## 2023-12-11 PROCEDURE — 85025 COMPLETE CBC W/AUTO DIFF WBC: CPT | Performed by: INTERNAL MEDICINE

## 2023-12-11 RX ORDER — PRAVASTATIN SODIUM 20 MG
20 TABLET ORAL DAILY
Qty: 90 TABLET | Refills: 3 | Status: SHIPPED | OUTPATIENT
Start: 2023-12-11

## 2023-12-11 RX ORDER — LISINOPRIL 20 MG/1
20 TABLET ORAL DAILY
Qty: 90 TABLET | Refills: 3 | Status: SHIPPED | OUTPATIENT
Start: 2023-12-11 | End: 2023-12-15

## 2023-12-11 RX ORDER — VENLAFAXINE HYDROCHLORIDE 150 MG/1
CAPSULE, EXTENDED RELEASE ORAL
Qty: 90 CAPSULE | Refills: 3 | Status: SHIPPED | OUTPATIENT
Start: 2023-12-11

## 2023-12-11 RX ORDER — METOPROLOL SUCCINATE 200 MG/1
200 TABLET, EXTENDED RELEASE ORAL DAILY
Qty: 90 TABLET | Refills: 3 | Status: SHIPPED | OUTPATIENT
Start: 2023-12-11

## 2023-12-11 RX ORDER — LISINOPRIL 20 MG/1
20 TABLET ORAL 2 TIMES DAILY
Qty: 180 TABLET | Refills: 3 | Status: SHIPPED | OUTPATIENT
Start: 2023-12-11 | End: 2023-12-15

## 2023-12-11 ASSESSMENT — ENCOUNTER SYMPTOMS
JOINT SWELLING: 0
PALPITATIONS: 0
COUGH: 0
HEMATOCHEZIA: 0
SORE THROAT: 0
PARESTHESIAS: 0
DIARRHEA: 0
ARTHRALGIAS: 1
HEMATURIA: 0
HEADACHES: 0
DYSURIA: 0
NERVOUS/ANXIOUS: 0
DIZZINESS: 0
MYALGIAS: 1
CONSTIPATION: 1
FEVER: 0
SHORTNESS OF BREATH: 0
WEAKNESS: 0
ABDOMINAL PAIN: 0
FREQUENCY: 1
HEARTBURN: 0
EYE PAIN: 0
CHILLS: 0
BREAST MASS: 0
NAUSEA: 0

## 2023-12-11 ASSESSMENT — ACTIVITIES OF DAILY LIVING (ADL): CURRENT_FUNCTION: NO ASSISTANCE NEEDED

## 2023-12-11 ASSESSMENT — PATIENT HEALTH QUESTIONNAIRE - PHQ9
SUM OF ALL RESPONSES TO PHQ QUESTIONS 1-9: 4
SUM OF ALL RESPONSES TO PHQ QUESTIONS 1-9: 4
10. IF YOU CHECKED OFF ANY PROBLEMS, HOW DIFFICULT HAVE THESE PROBLEMS MADE IT FOR YOU TO DO YOUR WORK, TAKE CARE OF THINGS AT HOME, OR GET ALONG WITH OTHER PEOPLE: NOT DIFFICULT AT ALL

## 2023-12-11 NOTE — PROGRESS NOTES
"SUBJECTIVE:   Mckenzie is a 73 year old, presenting for the following:    Non-fasting. Refills. Discuss Cologuard & RSV. Due for DEXA. FYI: Plans to have cataract surgery soon.   Medicare Visit and Refill Request        12/11/2023     2:15 PM   Additional Questions   Roomed by RIYA Diaz LPN   Accompanied by self         12/11/2023     2:15 PM   Patient Reported Additional Medications   Patient reports taking the following new medications none       Are you in the first 12 months of your Medicare coverage?  No    Healthy Habits:     In general, how would you rate your overall health?  Good    Frequency of exercise:  1 day/week    Duration of exercise:  Less than 15 minutes    Do you usually eat at least 4 servings of fruit and vegetables a day, include whole grains    & fiber and avoid regularly eating high fat or \"junk\" foods?  No    Taking medications regularly:  Yes    Medication side effects:  Muscle aches    Ability to successfully perform activities of daily living:  No assistance needed    Home Safety:  No safety concerns identified    Hearing Impairment:  No hearing concerns    In the past 6 months, have you been bothered by leaking of urine? Yes    In general, how would you rate your overall mental or emotional health?  Excellent    Additional concerns today:  No      Today's PHQ-9 Score:       12/11/2023     2:09 PM   PHQ-9 SCORE   PHQ-9 Total Score MyChart 4 (Minimal depression)   PHQ-9 Total Score 4           Have you ever done Advance Care Planning? (For example, a Health Directive, POLST, or a discussion with a medical provider or your loved ones about your wishes): Yes, advance care planning is on file.       Fall risk  Fallen 2 or more times in the past year?: No  Any fall with injury in the past year?: No    Cognitive Screening   1) Repeat 3 items (Leader, Season, Table)    2) Clock draw: NORMAL  3) 3 item recall: Recalls 3 objects  Results: 3 items recalled: COGNITIVE IMPAIRMENT LESS " LIKELY    Mini-CogTM Copyright VJ Campbell. Licensed by the author for use in [facility]; reprinted with permission (amy@[facility]). All rights reserved.      Do you have sleep apnea, excessive snoring or daytime drowsiness? : yes    Reviewed and updated as needed this visit by clinical staff   Tobacco  Allergies  Meds  Problems  Med Hx  Surg Hx  Fam Hx          Reviewed and updated as needed this visit by Provider                 Social History     Tobacco Use     Smoking status: Never     Smokeless tobacco: Never   Substance Use Topics     Alcohol use: Yes     Comment: rare             12/11/2023     2:10 PM   Alcohol Use   Prescreen: >3 drinks/day or >7 drinks/week? No     Do you have a current opioid prescription? No  Do you use any other controlled substances or medications that are not prescribed by a provider? Alcohol      Current providers sharing in care for this patient include:   Patient Care Team:  Anna Valerio MD as PCP - General (Internal Medicine)  Anna Valerio MD as Assigned PCP  Keyonna Cordova MD as Assigned Musculoskeletal Provider  Tio Rainey MD as Referring Physician (Internal Medicine)  Livia Zamorano PA-C as Physician Assistant (Dermatology)  Roney Castrejon MD as MD (Surgery)  Marbella Devi, NGOC as Specialty Care Coordinator (Hematology & Oncology)  Roney Castrejon MD as Assigned Surgical Provider  Aline Teague PA-C as Physician Assistant (Dermatology)    The following health maintenance items are reviewed in Epic and correct as of today:  Health Maintenance   Topic Date Due     RSV VACCINE (Pregnancy & 60+) (1 - 1-dose 60+ series) Never done     ANNUAL REVIEW OF HM ORDERS  03/28/2023     MEDICARE ANNUAL WELLNESS VISIT  10/19/2023     LIPID  10/19/2023     MAMMO SCREENING  01/16/2024     PHQ-9  06/11/2024     FALL RISK ASSESSMENT  12/11/2024     COLORECTAL CANCER SCREENING  06/16/2026     ADVANCE CARE PLANNING  10/19/2027      "DTAP/TDAP/TD IMMUNIZATION (3 - Td or Tdap) 01/02/2028     DEXA  08/14/2033     HEPATITIS C SCREENING  Completed     DEPRESSION ACTION PLAN  Completed     INFLUENZA VACCINE  Completed     Pneumococcal Vaccine: 65+ Years  Completed     ZOSTER IMMUNIZATION  Completed     COVID-19 Vaccine  Completed     IPV IMMUNIZATION  Aged Out     HPV IMMUNIZATION  Aged Out     MENINGITIS IMMUNIZATION  Aged Out     RSV MONOCLONAL ANTIBODY  Aged Out     BP Readings from Last 3 Encounters:   12/11/23 (!) 172/92   10/27/23 (!) 160/98   10/16/23 (!) 148/69    Wt Readings from Last 3 Encounters:   12/11/23 89.4 kg (197 lb)   10/27/23 90.3 kg (199 lb)   10/16/23 86.2 kg (190 lb)                  Mammogram Screening: Mammogram Screening: Recommended mammography every 1-2 years with patient discussion and risk factor consideration        Review of Systems   Constitutional:  Negative for chills and fever.   HENT:  Negative for congestion, ear pain, hearing loss and sore throat.    Eyes:  Negative for pain and visual disturbance.   Respiratory:  Negative for cough and shortness of breath.    Cardiovascular:  Negative for chest pain, palpitations and peripheral edema.   Gastrointestinal:  Positive for constipation. Negative for abdominal pain, diarrhea, heartburn, hematochezia and nausea.   Breasts:  Negative for tenderness, breast mass and discharge.   Genitourinary:  Positive for frequency and urgency. Negative for dysuria, genital sores, hematuria, pelvic pain, vaginal bleeding and vaginal discharge.   Musculoskeletal:  Positive for arthralgias and myalgias. Negative for joint swelling.   Skin:  Negative for rash.   Neurological:  Negative for dizziness, weakness, headaches and paresthesias.   Psychiatric/Behavioral:  Negative for mood changes. The patient is not nervous/anxious.      She is sleepy all the time. Wakes up tired. She knows she snores.     OBJECTIVE:   Pulse 78   Temp 97  F (36.1  C) (Oral)   Resp 20   Ht 1.626 m (5' 4\")   " "Wt 89.4 kg (197 lb)   LMP 10/24/2005   SpO2 95%   Breastfeeding No   BMI 33.81 kg/m   Estimated body mass index is 33.81 kg/m  as calculated from the following:    Height as of this encounter: 1.626 m (5' 4\").    Weight as of this encounter: 89.4 kg (197 lb).  Physical Exam  GENERAL: healthy, alert and no distress  EYES: Eyes grossly normal to inspection, PERRL and conjunctivae and sclerae normal  HENT: ear canals and TM's normal, nose and mouth without ulcers or lesions  NECK: no adenopathy, no asymmetry, masses, or scars and thyroid normal to palpation  RESP: lungs clear to auscultation - no rales, rhonchi or wheezes  BREAST: normal without masses, tenderness or nipple discharge and no palpable axillary masses or adenopathy  CV: regular rate and rhythm, normal S1 S2, no S3 or S4, no murmur, click or rub, no peripheral edema and peripheral pulses strong  ABDOMEN: soft, nontender, no hepatosplenomegaly, no masses and bowel sounds normal  MS: no gross musculoskeletal defects noted, no edema  SKIN: no suspicious lesions or rashes  NEURO: Normal strength and tone, mentation intact and speech normal  PSYCH: mentation appears normal, affect normal/bright      ASSESSMENT / PLAN:   (Z00.00) Preventative health care  (primary encounter diagnosis)  Comment:    Plan: CBC with platelets and differential,         Comprehensive metabolic panel (BMP + Alb, Alk         Phos, ALT, AST, Total. Bili, TP), TSH with free        T4 reflex             (G47.10) Excessive sleepiness  Comment: will check for sleep apnea. If she has it may be part of why her htn is not controlled.   Plan: Adult Sleep Eval & Management [facility]         Referral             (I10) Essential hypertension, benign  Comment: Will increase lisinopril to 20 mg bid  Plan: lisinopril (ZESTRIL) 20 MG tablet, metoprolol         succinate ER (TOPROL XL) 200 MG 24 hr tablet,         lisinopril (ZESTRIL) 20 MG tablet             (E78.5) Hyperlipidemia with target LDL " "less than 130  Comment:    Plan: Lipid panel reflex to direct LDL Non-fasting             (E78.5) Hyperlipidemia LDL goal <130  Comment:    Plan: pravastatin (PRAVACHOL) 20 MG tablet             (F33.42) Recurrent major depressive disorder, in full remission (H24)  Comment:    Plan: venlafaxine (EFFEXOR XR) 150 MG 24 hr capsule             Patient has been advised of split billing requirements and indicates understanding: Yes      COUNSELING:  Reviewed preventive health counseling, as reflected in patient instructions       Regular exercise       Healthy diet/nutrition      BMI:   Estimated body mass index is 33.81 kg/m  as calculated from the following:    Height as of this encounter: 1.626 m (5' 4\").    Weight as of this encounter: 89.4 kg (197 lb).   Weight management plan: Discussed healthy diet and exercise guidelines      She reports that she has never smoked. She has never used smokeless tobacco.      Appropriate preventive services were discussed with this patient, including applicable screening as appropriate for fall prevention, nutrition, physical activity, Tobacco-use cessation, weight loss and cognition.  Checklist reviewing preventive services available has been given to the patient.    Reviewed patients plan of care and provided an AVS. The Basic Care Plan (routine screening as documented in Health Maintenance) for Shanelle meets the Care Plan requirement. This Care Plan has been established and reviewed with the Patient.          Anna Valerio MD  [facility]    Identified Health Risks:  I have reviewed Opioid Use Disorder and Substance Use Disorder risk factors and made any needed referrals.   Answers submitted by the patient for this visit:  Patient Health Questionnaire (Submitted on 12/11/2023)  If you checked off any problems, how difficult have these problems made it for you to do your work, take care of things at home, or get along with other people?: Not difficult " at all  PHQ9 TOTAL SCORE: 4

## 2023-12-12 LAB
ALBUMIN SERPL BCG-MCNC: 5 G/DL (ref 3.5–5.2)
ALP SERPL-CCNC: 71 U/L (ref 40–150)
ALT SERPL W P-5'-P-CCNC: 47 U/L (ref 0–50)
ANION GAP SERPL CALCULATED.3IONS-SCNC: 10 MMOL/L (ref 7–15)
AST SERPL W P-5'-P-CCNC: 41 U/L (ref 0–45)
BILIRUB SERPL-MCNC: 0.4 MG/DL
BUN SERPL-MCNC: 14.3 MG/DL (ref 8–23)
CALCIUM SERPL-MCNC: 10.1 MG/DL (ref 8.8–10.2)
CHLORIDE SERPL-SCNC: 103 MMOL/L (ref 98–107)
CHOLEST SERPL-MCNC: 216 MG/DL
CREAT SERPL-MCNC: 0.93 MG/DL (ref 0.51–0.95)
DEPRECATED HCO3 PLAS-SCNC: 30 MMOL/L (ref 22–29)
EGFRCR SERPLBLD CKD-EPI 2021: 65 ML/MIN/1.73M2
FASTING STATUS PATIENT QL REPORTED: NO
GLUCOSE SERPL-MCNC: 99 MG/DL (ref 70–99)
HDLC SERPL-MCNC: 53 MG/DL
LDLC SERPL CALC-MCNC: 132 MG/DL
NONHDLC SERPL-MCNC: 163 MG/DL
POTASSIUM SERPL-SCNC: 4.7 MMOL/L (ref 3.4–5.3)
PROT SERPL-MCNC: 7.8 G/DL (ref 6.4–8.3)
SODIUM SERPL-SCNC: 143 MMOL/L (ref 135–145)
TRIGL SERPL-MCNC: 153 MG/DL
TSH SERPL DL<=0.005 MIU/L-ACNC: 0.57 UIU/ML (ref 0.3–4.2)

## 2023-12-15 ENCOUNTER — TELEPHONE (OUTPATIENT)
Dept: INTERNAL MEDICINE | Facility: CLINIC | Age: 73
End: 2023-12-15
Payer: COMMERCIAL

## 2023-12-15 DIAGNOSIS — I10 HTN, GOAL BELOW 140/90: Primary | ICD-10-CM

## 2023-12-15 RX ORDER — LISINOPRIL 40 MG/1
40 TABLET ORAL DAILY
Qty: 90 TABLET | Refills: 3 | Status: SHIPPED | OUTPATIENT
Start: 2023-12-15

## 2023-12-15 NOTE — TELEPHONE ENCOUNTER
Patient calls to state that [facility] is needing clarification on patient's Lisinopril. Patient state she is needing a new prescription for Lisinopril 40 mg versus two separate 20 mg Lisinopril because the pharmacy is confused with two active orders and won't dispense the medication until the one script for 40 mg dose is done.    Medication and pharmacy pending.    Thank you,  Boogie Cervantes, Triage RN [facility]  8:57 AM 12/15/2023     
normal...

## 2023-12-18 ENCOUNTER — PATIENT OUTREACH (OUTPATIENT)
Dept: CARE COORDINATION | Facility: CLINIC | Age: 73
End: 2023-12-18
Payer: COMMERCIAL

## 2024-01-22 ENCOUNTER — HOSPITAL ENCOUNTER (OUTPATIENT)
Dept: MAMMOGRAPHY | Facility: CLINIC | Age: 74
Discharge: HOME | End: 2024-01-22
Attending: INTERNAL MEDICINE | Admitting: INTERNAL MEDICINE
Payer: COMMERCIAL

## 2024-01-22 DIAGNOSIS — Z12.31 VISIT FOR SCREENING MAMMOGRAM: ICD-10-CM

## 2024-01-22 PROCEDURE — 77063 BREAST TOMOSYNTHESIS BI: CPT

## 2024-02-06 ENCOUNTER — OFFICE VISIT (OUTPATIENT)
Dept: FAMILY MEDICINE | Facility: CLINIC | Age: 74
End: 2024-02-06
Payer: COMMERCIAL

## 2024-02-06 DIAGNOSIS — D23.62 DYSPLASTIC NEVUS OF LEFT UPPER EXTREMITY: ICD-10-CM

## 2024-02-06 DIAGNOSIS — D18.01 CHERRY ANGIOMA: ICD-10-CM

## 2024-02-06 DIAGNOSIS — Z12.83 SKIN CANCER SCREENING: ICD-10-CM

## 2024-02-06 DIAGNOSIS — C43.61 MALIGNANT MELANOMA OF SKIN OF UPPER LIMB, INCLUDING SHOULDER, RIGHT (H): ICD-10-CM

## 2024-02-06 DIAGNOSIS — D49.2 NEOPLASM OF UNSPECIFIED BEHAVIOR OF BONE, SOFT TISSUE, AND SKIN: Primary | ICD-10-CM

## 2024-02-06 DIAGNOSIS — L81.4 SOLAR LENTIGINOSIS: ICD-10-CM

## 2024-02-06 DIAGNOSIS — D22.9 MULTIPLE PIGMENTED NEVI: ICD-10-CM

## 2024-02-06 DIAGNOSIS — D03.59 MELANOMA IN SITU OF BACK (H): ICD-10-CM

## 2024-02-06 DIAGNOSIS — L82.1 SEBORRHEIC KERATOSES: ICD-10-CM

## 2024-02-06 PROCEDURE — 11102 TANGNTL BX SKIN SINGLE LES: CPT | Performed by: PHYSICIAN ASSISTANT

## 2024-02-06 PROCEDURE — 88342 IMHCHEM/IMCYTCHM 1ST ANTB: CPT | Performed by: DERMATOLOGY

## 2024-02-06 PROCEDURE — 99213 OFFICE O/P EST LOW 20 MIN: CPT | Mod: 25 | Performed by: PHYSICIAN ASSISTANT

## 2024-02-06 PROCEDURE — 88341 IMHCHEM/IMCYTCHM EA ADD ANTB: CPT | Performed by: DERMATOLOGY

## 2024-02-06 PROCEDURE — 11103 TANGNTL BX SKIN EA SEP/ADDL: CPT | Performed by: PHYSICIAN ASSISTANT

## 2024-02-06 PROCEDURE — 88305 TISSUE EXAM BY PATHOLOGIST: CPT | Performed by: DERMATOLOGY

## 2024-02-06 NOTE — LETTER
2/6/2024         RE: Shanelle Sepulveda  [address] [place] [address]        Dear Colleague,    Thank you for referring your patient, Shanelle Sepulveda, to the [facility]. Please see a copy of my visit note below.    [facility] Dermatology Note  Encounter Date: Feb 6, 2024  Office Visit     Dermatology Problem List:  1. MM left upper arm and right upper arm  WLE and SNL bx 10/16/23    2. ISK right flank s/p cryotherapy  3.  Skin cancer screening     ____________________________________________    Assessment & Plan:     # NUB left distal upper arm and left lower back,  will perform shave bx today to determine management.    # Hx of MM on her left upper and right upper arms,  -S/p WLE of bl upper arms and SNL bx (neg) October 16, 2023.  -(No signs of recurrence)    # Skin cancer screening with multiple benign nevi, solar lentigines  .dgpla  - ABCDEs: Counseled ABCDEs of melanoma: Asymmetry, Border (irregularity), Color (not uniform, changes in color), Diameter (greater than 6 mm which is about the size of a pencil eraser), and Evolving (any changes in preexisting moles).  - Sun protection: Counseled SPF30+ sunscreen, UPF clothing, sun avoidance, tanning bed avoidance.    # Cherry angiomas and seborrheic keratoses,  Benign, reassurance given.        Procedures Performed:   - Shave biopsy procedure note, location(s):left lower back and left distal upper arm. After discussion of benefits and risks including but not limited to bleeding, infection, scar, incomplete removal, recurrence, and non-diagnostic biopsy, written consent and photographs were obtained. The area was cleaned with isopropyl alcohol.  1mL of 1% lidocaine with epinephrine was injected to obtain adequate anesthesia of lesion(s). Shave biopsy at site(s) performed. Hemostasis was achieved with aluminium chloride. Petrolatum ointment and a sterile dressing were applied. The patient tolerated the  procedure and no complications were noted. The patient was provided with verbal and written post care instructions.         Follow-up: 3 month (s) in-person, or earlier for new or changing lesions    Staff:     All risks, benefits and alternatives were discussed with patient.  Patient is in agreement and understands the assessment and plan.  All questions were answered.  Sun Screen Education was given.   Return to Clinic 3 months or sooner as needed.   Aline Teague PA-C    ____________________________________________    CC: Skin Check    HPI:  Ms. Shanelle Sepulveda is a(n) 73 year old female who presents today as a new patient for a skin check. Last seen in dermatology 9/5/23 when she had two bx on her upper arms and subsequently had wide local excision with Dr Castrejon and a sentinal lymphnode biopsy on her right axilla 10/16/23.     Today, Mckenzie is here for her first post surgical skin check. She denies any spot that are painful, bleeding, itchy or changing.     She typically stays out of the sun.  She had 1 bad blistering burn in her teen years and burns easily        Patient is otherwise feeling well, without additional skin concerns.     Labs Reviewed:  R upper arm:  - Malignant melanoma, superficial spreading type, Breslow depth at least 1.3mm, ulcerated, extending to the lateral and deep margins - (see comment and description)      B(2). L upper arm:  - Malignant melanoma, superficial spreading type, Breslow depth 0.5mm, with in situ component extending to the lateral margin    Physical Exam:  Vitals: LMP 10/24/2005   SKIN: Full skin, which includes the head/face, both arms, chest, back, abdomen,both legs, genitalia and/or groin buttocks, digits and/or nails, was examined.  LYMPH: There is no cervical, supraclavicular, axillary, or inguinal lymphadenopathy.   -There is a 6 mm brownish violaceous papule on the left distal upper arm  -There is a brown asymmetric macule, 7 mm on the left lower back.  -Linear  scars on the right upper arm and left upper arm  - There are dome shaped bright red papules on the trunk and proximal extremities, including a violaceous papule on the right upper lateral thigh.   Multiple regular brown pigmented macules and papules are identified on the trunk and extremities.   Scattered brown macules on sun exposed areas.  There are waxy stuck on tan to brown papules on the face, trunk and extremities..   - No other lesions of concern on areas examined.                   Medications:  Current Outpatient Medications   Medication     ASPIRIN PO     Cholecalciferol (VITAMIN D3) 50 MCG (2000 UT) CAPS     fish oil-omega-3 fatty acids 1000 MG capsule     lisinopril (ZESTRIL) 40 MG tablet     metoprolol succinate ER (TOPROL XL) 200 MG 24 hr tablet     Multiple Vitamins-Minerals (MULTIVITAL PO)     pravastatin (PRAVACHOL) 20 MG tablet     venlafaxine (EFFEXOR XR) 150 MG 24 hr capsule     No current facility-administered medications for this visit.      Past Medical History:   Patient Active Problem List   Diagnosis     Generalized anxiety disorder     Advanced directives, counseling/discussion     Overactive bladder     Atrophic vaginitis     Hyperlipidemia with target LDL less than 130     HTN, goal below 140/90     Mild major depression (H)     Urgency-frequency syndrome     Family history of malignant neoplasm of breast     Cervical cancer screening     Morbid obesity (H)     Major depression in complete remission (H24)     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, including shoulder, left (H)     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, including shoulder, right (H)     Past Medical History:   Diagnosis Date     Intramural leiomyoma of uterus      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, including shoulder, left (H) 9/21/2023     MENOPAUSAL DISORDER NOS 8/10/2005    Menses getting much heavier, pelvic sono 02/06--2 uterine fibroids seen, 1.5 and 2.7cm each; simple left ovarian cyst 4.2 cm, 2.6 cm  complex right ovarian cyst--endometrial biopsy benign     Symptomatic menopausal or female climacteric states        CC No referring provider defined for this encounter. on close of this encounter.       Again, thank you for allowing me to participate in the care of your patient.        Sincerely,        Aline Teague PA-C

## 2024-02-06 NOTE — PATIENT INSTRUCTIONS
Wound Care After a Biopsy    What is a skin biopsy?  A skin biopsy allows the doctor to examine a very small piece of tissue under the microscope to determine the diagnosis and the best treatment for the skin condition. A local anesthetic (numbing medicine) is injected with a very small needle into the skin area to be tested. A small piece of skin is taken from the area. Sometimes a suture (stitch) is used.     What are the risks of a skin biopsy?  I will experience scar, bleeding, swelling, pain, crusting and redness. I may experience incomplete removal or recurrence. Risks of this procedure are excessive bleeding, bruising, infection, nerve damage, numbness, thick (hypertrophic or keloidal) scar and non-diagnostic biopsy.    How should I care for my wound for the first 24 hours?  Keep the wound dry and covered for 24 hours  If it bleeds, hold direct pressure on the area for 15 minutes. If bleeding does not stop, call us or go to the emergency room  Avoid strenuous exercise the first 1-2 days or as your doctor instructs you    How should I care for the wound after 24 hours?  After 24 hours, remove the bandage  You may bathe or shower as normal  If you had a scalp biopsy, you can shampoo as usual and can use shower water to clean the biopsy site daily  Clean the wound once a day with gentle soap and water  Do not scrub, be gentle  Apply white petroleum/Vaseline after cleaning the wound with a cotton swab or a clean finger, and keep the site covered with a Bandaid /bandage. Bandages are not necessary with a scalp biopsy  If you are unable to cover the site with a Bandaid /bandage, re-apply ointment 2-3 times a day to keep the site moist. Moisture will help with healing  Avoid strenuous activity for first 1-2 days  Avoid lakes, rivers, pools, and oceans until the stitches are removed or the site is healed    How do I clean my wound?  Wash hands thoroughly with soap or use hand  before all wound care  Clean  the wound with gentle soap and water  Apply white petroleum/Vaseline  to wound after it is clean  Replace the Bandaid /bandage to keep the wound covered for the first few days or as instructed by your doctor  If you had a scalp biopsy, warm shower water to the area on a daily basis should suffice    What should I use to clean my wound?   Cotton-tipped applicators (Qtips )  White petroleum jelly (Vaseline ). Use a clean new container and use Q-tips to apply.  Bandaids  as needed  Gentle soap     How should I care for my wound long term?  Do not get your wound dirty  Keep up with wound care for one week or until the area is healed.    A small scab will form and fall off by itself when the area is completely healed. The area will be red and will become pink in color as it heals. Sun protection is very important for how your scar will turn out. Sunscreen with an SPF 30 or greater is recommended once the area is healed.  You should have some soreness but it should be mild and slowly go away over several days. Talk to your doctor about using tylenol for pain,    When should I call my doctor?  If you have increased:   Pain or swelling  Pus or drainage (clear or slightly yellow drainage is ok)  Temperature over 100F  Spreading redness or warmth around wound    When will I hear about my results?  The biopsy results can take 2 weeks to come back.  Your results will automatically release to [facility] before your provider has even reviewed them.  The clinic will call you with the results, send you a [facility] message, or have you schedule a follow-up clinic or phone time to discuss the results.  Contact our clinics if you do not hear from us in 2 weeks.    Who should I call with questions?  [facility]: [phone]  [facility]: [phone]  For urgent needs outside of business hours call the [facility] at [phone] and ask for the dermatology resident on  call

## 2024-02-09 NOTE — PROGRESS NOTES
[facility] Dermatology Note  Encounter Date: Feb 6, 2024  Office Visit     Dermatology Problem List:  1. MM left upper arm and right upper arm  WLE and SNL bx 10/16/23    2. ISK right flank s/p cryotherapy  3.  Skin cancer screening     ____________________________________________    Assessment & Plan:     # NUB left distal upper arm and left lower back,  will perform shave bx today to determine management.    # Hx of MM on her left upper and right upper arms,  -S/p WLE of bl upper arms and SNL bx (neg) October 16, 2023.  -(No signs of recurrence)    # Skin cancer screening with multiple benign nevi, solar lentigines  .dgpla  - ABCDEs: Counseled ABCDEs of melanoma: Asymmetry, Border (irregularity), Color (not uniform, changes in color), Diameter (greater than 6 mm which is about the size of a pencil eraser), and Evolving (any changes in preexisting moles).  - Sun protection: Counseled SPF30+ sunscreen, UPF clothing, sun avoidance, tanning bed avoidance.    # Cherry angiomas and seborrheic keratoses,  Benign, reassurance given.        Procedures Performed:   - Shave biopsy procedure note, location(s):left lower back and left distal upper arm. After discussion of benefits and risks including but not limited to bleeding, infection, scar, incomplete removal, recurrence, and non-diagnostic biopsy, written consent and photographs were obtained. The area was cleaned with isopropyl alcohol.  1mL of 1% lidocaine with epinephrine was injected to obtain adequate anesthesia of lesion(s). Shave biopsy at site(s) performed. Hemostasis was achieved with aluminium chloride. Petrolatum ointment and a sterile dressing were applied. The patient tolerated the procedure and no complications were noted. The patient was provided with verbal and written post care instructions.         Follow-up: 3 month (s) in-person, or earlier for new or changing lesions    Staff:     All risks, benefits and alternatives were  discussed with patient.  Patient is in agreement and understands the assessment and plan.  All questions were answered.  Sun Screen Education was given.   Return to Clinic 3 months or sooner as needed.   Aline Teague PA-C    ____________________________________________    CC: Skin Check    HPI:  Ms. Shanelle Sepulveda is a(n) 73 year old female who presents today as a new patient for a skin check. Last seen in dermatology 9/5/23 when she had two bx on her upper arms and subsequently had wide local excision with Dr Castrejon and a sentinal lymphnode biopsy on her right axilla 10/16/23.     Today, Mckenzie is here for her first post surgical skin check. She denies any spot that are painful, bleeding, itchy or changing.     She typically stays out of the sun.  She had 1 bad blistering burn in her teen years and burns easily        Patient is otherwise feeling well, without additional skin concerns.     Labs Reviewed:  R upper arm:  - Malignant melanoma, superficial spreading type, Breslow depth at least 1.3mm, ulcerated, extending to the lateral and deep margins - (see comment and description)      B(2). L upper arm:  - Malignant melanoma, superficial spreading type, Breslow depth 0.5mm, with in situ component extending to the lateral margin    Physical Exam:  Vitals: LMP 10/24/2005   SKIN: Full skin, which includes the head/face, both arms, chest, back, abdomen,both legs, genitalia and/or groin buttocks, digits and/or nails, was examined.  LYMPH: There is no cervical, supraclavicular, axillary, or inguinal lymphadenopathy.   -There is a 6 mm brownish violaceous papule on the left distal upper arm  -There is a brown asymmetric macule, 7 mm on the left lower back.  -Linear scars on the right upper arm and left upper arm  - There are dome shaped bright red papules on the trunk and proximal extremities, including a violaceous papule on the right upper lateral thigh.   Multiple regular brown pigmented macules and papules are  identified on the trunk and extremities.   Scattered brown macules on sun exposed areas.  There are waxy stuck on tan to brown papules on the face, trunk and extremities..   - No other lesions of concern on areas examined.                   Medications:  Current Outpatient Medications   Medication    ASPIRIN PO    Cholecalciferol (VITAMIN D3) 50 MCG (2000 UT) CAPS    fish oil-omega-3 fatty acids 1000 MG capsule    lisinopril (ZESTRIL) 40 MG tablet    metoprolol succinate ER (TOPROL XL) 200 MG 24 hr tablet    Multiple Vitamins-Minerals (MULTIVITAL PO)    pravastatin (PRAVACHOL) 20 MG tablet    venlafaxine (EFFEXOR XR) 150 MG 24 hr capsule     No current facility-administered medications for this visit.      Past Medical History:   Patient Active Problem List   Diagnosis    Generalized anxiety disorder    Advanced directives, counseling/discussion    Overactive bladder    Atrophic vaginitis    Hyperlipidemia with target LDL less than 130    HTN, goal below 140/90    Mild major depression (H)    Urgency-frequency syndrome    Family history of malignant neoplasm of breast    Cervical cancer screening    Morbid obesity (H)    Major depression in complete remission (H24)    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, including shoulder, left (H)    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, including shoulder, right (H)     Past Medical History:   Diagnosis Date    Intramural leiomyoma of uterus     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, including shoulder, left (H) 9/21/2023    MENOPAUSAL DISORDER NOS 8/10/2005    Menses getting much heavier, pelvic sono 02/06--2 uterine fibroids seen, 1.5 and 2.7cm each; simple left ovarian cyst 4.2 cm, 2.6 cm complex right ovarian cyst--endometrial biopsy benign    Symptomatic menopausal or female climacteric states        CC No referring provider defined for this encounter. on close of this encounter.

## 2024-02-12 ENCOUNTER — TELEPHONE (OUTPATIENT)
Dept: FAMILY MEDICINE | Facility: CLINIC | Age: 74
End: 2024-02-12
Payer: COMMERCIAL

## 2024-02-12 DIAGNOSIS — D23.62 DYSPLASTIC NEVUS OF LEFT UPPER EXTREMITY: ICD-10-CM

## 2024-02-12 DIAGNOSIS — D03.59 MELANOMA IN SITU OF BACK (H): Primary | ICD-10-CM

## 2024-02-12 NOTE — TELEPHONE ENCOUNTER
S/w pt and went over Aline's message below about biopsy results and pt reviewed results and message via my chart also.  Explained and answered all questions about the excision process and pt states understanding.  Advised will refer to the [facility] and a  will call her to schedule consult and procedure appts.  Pt states understanding.    Jaleesa SOLIS RN  [facility] [place]  [phone]

## 2024-02-12 NOTE — TELEPHONE ENCOUNTER
----- Message from Aline Teague PA-C sent at 2/9/2024  4:38 PM CST -----  I called and left a message to review the results and that I would be sending her results on mychart.   If she does not check her mychart, will you please  let her know:    The spot biopsied on the left distal upper arm is called a dysplastic nevus.  It has abnormal melanocytes (moderately to severely) and does not appear to be fully excised. The dermatopathologist recommends further excision of the mole to ensure all of the abnormal cells are removed.     The spot on the left lower back was an early melanoma (melanoma in-situ). Because this skin cancer is just in the top layer of the skin it can be removed with in in office excision.   I will refer you to dermatology surgery department at the [facility] for these procedures. You will get a call from the dermatology surgery scheduler to make an appointment with one of our surgeons.     Thanks,  Aline Teague PA-C     A. Left distal upper arm:  - Compound dysplastic nevus with moderate to severe atypia, extending to the lateral and deep margins - (see comment and description)        B. Left lower back:  - Melanoma in situ arising within a precursor nevus - (see comment and description)      I didn't reach you on your phone, but these are the results of the two biopsies:     The spot biopsied on the left distal upper arm is called a dysplastic nevus.  It has abnormal melanocytes (moderately to severely) and does not appear to be fully excised. The dermatopathologist recommends further excision of the mole to ensure all of the abnormal cells are removed.     The spot on the left lower back was an early melanoma (melanoma in-situ). Because this skin cancer is just in the top layer of the skin it can be removed with in in office excision.     I will refer you to dermatology surgery department at the [facility] for these procedures. You will get a call  from the dermatology surgery scheduler to make an appointment with one of our surgeons.     Thanks,  Aline Teague PA-C   Written by Aline Teague PA-C on 2/9/2024  4:38 PM CST  Seen by patient Mckenzie DOWELL Derick on 2/10/2024  8:11 PM

## 2024-02-15 ENCOUNTER — TELEPHONE (OUTPATIENT)
Dept: DERMATOLOGY | Facility: CLINIC | Age: 74
End: 2024-02-15
Payer: COMMERCIAL

## 2024-02-15 NOTE — TELEPHONE ENCOUNTER
Called patient to schedule surgery with Dr. Christian    Date of Surgery: 03/20    Surgery type: excision    Consult scheduled: No    Has patient had mohs with us before? Not Applicable    Additional comments: maral Gill on 2/15/2024 at 12:01 PM

## 2024-03-11 ENCOUNTER — MYC MEDICAL ADVICE (OUTPATIENT)
Dept: INTERNAL MEDICINE | Facility: CLINIC | Age: 74
End: 2024-03-11

## 2024-03-11 DIAGNOSIS — M25.562 LEFT KNEE PAIN, UNSPECIFIED CHRONICITY: Primary | ICD-10-CM

## 2024-03-12 NOTE — PROGRESS NOTES
"ASSESSMENT & PLAN  Patient Instructions     1. Primary osteoarthritis of left knee        -Patient presents today for evaluation of her left knee that she reports with a give out and pain will occur.  This last for short periods of time.  We reviewed her imaging today which does show some medial joint arthritis.  I discussed with her that her exam is reassuring that her ligaments are intact.  We discussed management strategies today for treatment of her knee arthritis these include over-the-counter Tylenol, ibuprofen, glucosamine/chondroitin, turmeric, topical creams such as Voltaren gel, Aspercreme, china gel, etc., bracing, physical therapy, steroid injection, and surgical intervention.  -After reviewing her options she would like to consider physical therapy and referral has been placed for this.  We discussed that if her symptoms or not improving in 6 to 8 weeks that she should return to clinic for repeat evaluation and consideration of further intervention.    -Call direct clinic number [[phone]] at any time with questions or concerns.        Debra Mccullough PA-C  [facility]    -----  Chief Complaint   Patient presents with    Left Knee - Pain       SUBJECTIVE  Shanelle Sepulveda is a/an 73 year old female who is seen in consultation at the request of  Anna Valerio M.D. for evaluation of left knee pain.  Onset was last week on the 5th and patient reports on average her knee will \"give out\" on her 3 times a day. Pain is located anterior knee - pain is only when it gives out. Symptoms are worsened by giving out when she is walking.  She has tried ice and knee brace. Associated symptoms include  Swelling and Giving out.    The patient is seen with her     Prior injury/Surgical history of affected joint: nothing  Social History/Occupation: retired     REVIEW OF SYSTEMS:  Pertinent positives/negative: As stated above in HPI    OBJECTIVE:  Ht 1.626 m (5' 4\")   Wt " 86.2 kg (190 lb)   LMP 10/24/2005   BMI 32.61 kg/m     General: Alert and in no distress  Skin: no visable rashes  CV: Extremities appear well perfused   Resp: normal respiratory effort, no conversational dyspnea   Psych: normal mood, affect  MSK: LEFT KNEE    Inspection:  There is no evidence of open wounds.   There is no evidence of erythema or infection  There is was some mild posterior swelling.    Palpation:  There is no palpable fluctuance  There is tenderness to palpation about the medial joint line. Remainder of the knee non tender.    Strength:  Knee flexion: 5/5  Knee extension: 5/5  Extensor mechanism intact    Sensory:  Intact to light touch in the lower extremity bilaterally     Dorsalis pedis pulse is palpable and equal to contralateral side    Range of Motion:  Flexion: 110 degrees  Extension: 0 degrees    Examination Maneuvers:  Niru's: -  Anterior drawer: -  Posterior drawer: -    Stable to varus and valgus stress at 0 and 30 degrees of flexion       RADIOLOGY:  Final results and radiologist's interpretation available in the [facility] health record.  Images below were personally reviewed and discussed with the patient in the office today.  My personal interpretation of the performed imaging: Left knee demonstrated medial joint narrowing with degenerative changes.  No acute fracture or dislocation.

## 2024-03-14 ENCOUNTER — OFFICE VISIT (OUTPATIENT)
Dept: ORTHOPEDICS | Facility: CLINIC | Age: 74
End: 2024-03-14
Attending: INTERNAL MEDICINE
Payer: COMMERCIAL

## 2024-03-14 ENCOUNTER — ANCILLARY PROCEDURE (OUTPATIENT)
Dept: GENERAL RADIOLOGY | Facility: CLINIC | Age: 74
End: 2024-03-14
Attending: PHYSICIAN ASSISTANT
Payer: COMMERCIAL

## 2024-03-14 VITALS — BODY MASS INDEX: 32.44 KG/M2 | WEIGHT: 190 LBS | HEIGHT: 64 IN

## 2024-03-14 DIAGNOSIS — M25.562 LEFT KNEE PAIN, UNSPECIFIED CHRONICITY: ICD-10-CM

## 2024-03-14 DIAGNOSIS — M17.12 PRIMARY OSTEOARTHRITIS OF LEFT KNEE: Primary | ICD-10-CM

## 2024-03-14 PROCEDURE — 73562 X-RAY EXAM OF KNEE 3: CPT | Mod: TC | Performed by: RADIOLOGY

## 2024-03-14 PROCEDURE — 73560 X-RAY EXAM OF KNEE 1 OR 2: CPT | Mod: TC | Performed by: RADIOLOGY

## 2024-03-14 PROCEDURE — 99203 OFFICE O/P NEW LOW 30 MIN: CPT | Performed by: PHYSICIAN ASSISTANT

## 2024-03-14 NOTE — PATIENT INSTRUCTIONS
1. Primary osteoarthritis of left knee        -Patient presents today for evaluation of her left knee that she reports with a give out and pain will occur.  This last for short periods of time.  We reviewed her imaging today which does show some medial joint arthritis.  I discussed with her that her exam is reassuring that her ligaments are intact.  We discussed management strategies today for treatment of her knee arthritis these include over-the-counter Tylenol, ibuprofen, glucosamine/chondroitin, turmeric, topical creams such as Voltaren gel, Aspercreme, china gel, etc., bracing, physical therapy, steroid injection, and surgical intervention.  -After reviewing her options she would like to consider physical therapy and referral has been placed for this.  We discussed that if her symptoms or not improving in 6 to 8 weeks that she should return to clinic for repeat evaluation and consideration of further intervention.    -Call direct clinic number [[phone]] at any time with questions or concerns.

## 2024-03-14 NOTE — LETTER
"    3/14/2024         RE: Shanelle Sepulveda  [address] [address]        Dear Colleague,    Thank you for referring your patient, Shanelle Sepulveda, to the [facility] [place]. Please see a copy of my visit note below.    ASSESSMENT & PLAN  Patient Instructions     1. Primary osteoarthritis of left knee        -Patient presents today for evaluation of her left knee that she reports with a give out and pain will occur.  This last for short periods of time.  We reviewed her imaging today which does show some medial joint arthritis.  I discussed with her that her exam is reassuring that her ligaments are intact.  We discussed management strategies today for treatment of her knee arthritis these include over-the-counter Tylenol, ibuprofen, glucosamine/chondroitin, turmeric, topical creams such as Voltaren gel, Aspercreme, china gel, etc., bracing, physical therapy, steroid injection, and surgical intervention.  -After reviewing her options she would like to consider physical therapy and referral has been placed for this.  We discussed that if her symptoms or not improving in 6 to 8 weeks that she should return to clinic for repeat evaluation and consideration of further intervention.    -Call direct clinic number [[phone]] at any time with questions or concerns.        Debra Mccullough PA-C  [facility]    -----  Chief Complaint   Patient presents with     Left Knee - Pain       SUBJECTIVE  Shanelle Sepulveda is a/an 73 year old female who is seen in consultation at the request of  Anna Valerio M.D. for evaluation of left knee pain.  Onset was last week on the 5th and patient reports on average her knee will \"give out\" on her 3 times a day. Pain is located anterior knee - pain is only when it gives out. Symptoms are worsened by giving out when she is walking.  She has tried ice and knee brace. Associated symptoms include  " "Swelling and Giving out.    The patient is seen with her     Prior injury/Surgical history of affected joint: nothing  Social History/Occupation: retired     REVIEW OF SYSTEMS:  Pertinent positives/negative: As stated above in HPI    OBJECTIVE:  Ht 1.626 m (5' 4\")   Wt 86.2 kg (190 lb)   LMP 10/24/2005   BMI 32.61 kg/m     General: Alert and in no distress  Skin: no visable rashes  CV: Extremities appear well perfused   Resp: normal respiratory effort, no conversational dyspnea   Psych: normal mood, affect  MSK: LEFT KNEE    Inspection:  There is no evidence of open wounds.   There is no evidence of erythema or infection  There is was some mild posterior swelling.    Palpation:  There is no palpable fluctuance  There is tenderness to palpation about the medial joint line. Remainder of the knee non tender.    Strength:  Knee flexion: 5/5  Knee extension: 5/5  Extensor mechanism intact    Sensory:  Intact to light touch in the lower extremity bilaterally     Dorsalis pedis pulse is palpable and equal to contralateral side    Range of Motion:  Flexion: 110 degrees  Extension: 0 degrees    Examination Maneuvers:  Niru's: -  Anterior drawer: -  Posterior drawer: -    Stable to varus and valgus stress at 0 and 30 degrees of flexion       RADIOLOGY:  Final results and radiologist's interpretation available in the [facility] health record.  Images below were personally reviewed and discussed with the patient in the office today.  My personal interpretation of the performed imaging: Left knee demonstrated medial joint narrowing with degenerative changes.  No acute fracture or dislocation.          Again, thank you for allowing me to participate in the care of your patient.        Sincerely,        Debra Mccullough PA-C  "

## 2024-03-20 ENCOUNTER — TELEPHONE (OUTPATIENT)
Dept: DERMATOLOGY | Facility: CLINIC | Age: 74
End: 2024-03-20

## 2024-03-20 ENCOUNTER — OFFICE VISIT (OUTPATIENT)
Dept: DERMATOLOGY | Facility: CLINIC | Age: 74
End: 2024-03-20
Payer: COMMERCIAL

## 2024-03-20 VITALS — HEART RATE: 67 BPM | DIASTOLIC BLOOD PRESSURE: 88 MMHG | SYSTOLIC BLOOD PRESSURE: 164 MMHG

## 2024-03-20 DIAGNOSIS — D23.62 DYSPLASTIC NEVUS OF LEFT UPPER EXTREMITY: ICD-10-CM

## 2024-03-20 DIAGNOSIS — D03.59 MELANOMA IN SITU OF BACK (H): Primary | ICD-10-CM

## 2024-03-20 PROCEDURE — 11604 EXC TR-EXT MAL+MARG 3.1-4 CM: CPT | Mod: GC | Performed by: DERMATOLOGY

## 2024-03-20 PROCEDURE — 88305 TISSUE EXAM BY PATHOLOGIST: CPT | Mod: 26 | Performed by: PATHOLOGY

## 2024-03-20 PROCEDURE — 12034 INTMD RPR S/TR/EXT 7.6-12.5: CPT | Mod: GC | Performed by: DERMATOLOGY

## 2024-03-20 PROCEDURE — 88305 TISSUE EXAM BY PATHOLOGIST: CPT | Mod: TC | Performed by: DERMATOLOGY

## 2024-03-20 PROCEDURE — 11402 EXC TR-EXT B9+MARG 1.1-2 CM: CPT | Mod: GC | Performed by: DERMATOLOGY

## 2024-03-20 NOTE — TELEPHONE ENCOUNTER
Follow up call completed following excision procedure with Dr. Christian.       Are you having pain? no  Are you taking pain medication? no  Are you applying ice?  no  Have you had any noticeable bleeding through the bandage? no   Do you have any other concerns? no       Please call [phone] if you have any questions or concerns.

## 2024-03-20 NOTE — NURSING NOTE
Chief Complaint   Patient presents with    Skin Cancer     MIS lower back, dysplastic nevus arm    Saige ALICIA, RN-BSN  Dermatology Surgery  [phone]

## 2024-03-20 NOTE — PROGRESS NOTES
DERMATOLOGY EXCISION PROCEDURE NOTE    Dermatology Problem List:  # MM left upper arm and right upper arm, WLE and SNL bx 10/16/23  # MIS, left lower back, s/p shave 2/6/24, s/p exc. 3/20/24  # DN, left distal upper arm, moderate to severe atypia, s/p exc. 3/20/24  # ISK right flank s/p cryotherapy       NAME OF PROCEDURE: Excision intermediate layered linear closure  Staff surgeon: Cheo Christian MD  Fellow surgeon: Mary Banks MD  Scrub Nurse: Saige Correia RN    Case 1    PRE-OPERATIVE DIAGNOSIS:  Melanoma in situ   POST-OPERATIVE DIAGNOSIS: Same   LOCATION: L Lower Back  FINAL EXCISION SIZE(DEFECT SIZE): 3.1 x 2.4 cm  MARGIN: 0.7 cm  FINAL REPAIR LENGTH: 5.3 cm   ANESTHESIA: 9 cc 1% lidocaine with 1:100,000 epinephrine    INDICATIONS: This patient presented with a 1.7 x 1.0 cm Melanoma in situ. Excision was indicated. We discussed the principles of treatment and most likely complications including scarring, bleeding, infection, incomplete excision, wound dehiscence, pain, nerve damage, and recurrence. Informed consent was obtained and the patient underwent the procedure as follows:    PROCEDURE: The patient was taken to the operative suite. Time-out was performed.  The treatment area was anesthetized with 1% lidocaine with epinephrine. The area was prepped with Chlorhexidine and rinsed with sterile saline and draped with sterile towels. The lesion was delineated and excised down to subcutaneous fat in a elliptical manner. Hemostasis was obtained by electrocoagulation.     REPAIR: An intermediate layered linear closure was selected as the procedure which would maximally preserve both function and cosmesis.    After the excision of the tumor, the area was carefully undermined. Hemostasis was obtained with electrocoagulation.  Closure was oriented so that the wound was in the patient's natural skin tension lines. The subcutaneous and dermal layers were then closed with 3-0 vicryl and 4-0 monocryl sutures. The  epidermis was then carefully approximated along the length of the wound using 4-0 monocryl running subcuticular sutures.     Estimated blood loss was less than 10 ml for all surgical sites. A sterile pressure dressing was applied and wound care instructions, with a written handout, were given. The patient was discharged from the [facility]atologic Surgery Center alert and ambulatory.    The patient elected for pathology results to automatically release and understands that the clinical staff will contact them as soon as possible to notify them of the results.    Dr. Cheo Christian was immediately available for the entire surgery and was physicially present for the key portions of the procedure.    Anatomic Pathology Results: pending        Case 2    NAME OF PROCEDURE: Excision intermediate layered linear closure  Staff surgeon: Cheo Christian MD  Fellow surgeon: Mary Banks MD  Scrub Nurse: Saige Correia RN    PRE-OPERATIVE DIAGNOSIS:  Dysplastic nevus  POST-OPERATIVE DIAGNOSIS: Same   LOCATION: L Distal Upper Arm  FINAL EXCISION SIZE(DEFECT SIZE): 2.0 x 2.0 cm  MARGIN: 0.5 cm  FINAL REPAIR LENGTH: 5.0 cm   ANESTHESIA: 9 cc 1% lidocaine with 1:100,000 epinephrine    INDICATIONS: This patient presented with a 1.0 x 1.0 cm Dysplastic nevus. Excision was indicated. We discussed the principles of treatment and most likely complications including scarring, bleeding, infection, incomplete excision, wound dehiscence, pain, nerve damage, and recurrence. Informed consent was obtained and the patient underwent the procedure as follows:    PROCEDURE: The patient was taken to the operative suite. Time-out was performed.  The treatment area was anesthetized with 1% lidocaine with epinephrine. The area was prepped with Chlorhexidine and rinsed with sterile saline and draped with sterile towels. The lesion was delineated and excised down to subcutaneous fat in a elliptical manner. Hemostasis was obtained by electrocoagulation.     REPAIR:  An intermediate layered linear closure was selected as the procedure which would maximally preserve both function and cosmesis.    After the excision of the tumor, the area was carefully undermined. Hemostasis was obtained with electrocoagulation.  Closure was oriented so that the wound was in the patient's natural skin tension lines. The subcutaneous and dermal layers were then closed with 4-0 monocryl sutures. The epidermis was then carefully approximated along the length of the wound using 4-0 monocryl running subcuticular sutures.     Estimated blood loss was less than 10 ml for all surgical sites. A sterile pressure dressing was applied and wound care instructions, with a written handout, were given. The patient was discharged from the [facility]atologic Surgery Center alert and ambulatory.    The patient elected for pathology results to automatically release and understands that the clinical staff will contact them as soon as possible to notify them of the results.      Dr. Cheo Christian was immediately available for the entire surgery and was physicially present for the key portions of the procedure.    Anatomic Pathology Results: pending      Clinical Follow-Up: 5/7/24 as scheduled     Mary Banks MD  Micrographic Surgery and Dermatologic Oncology (MSDO) Fellow, PGY-5      Staff Involved:   Scribe/Fellow/Staff    Scribe Disclosure:   I, ASIM DURAN, am serving as a scribe; to document services personally performed by Cheo Christian MD -based on data collection and the provider's statements to me.       Attending attestation:  I was present for key elements of the procedure and immediately available for all other portions of the procedure.  I have reviewed the note and edited it as necessary.    Cheo Christian M.D.  Professor  Director of Dermatologic Surgery  Department of Dermatology  [facility]    Dermatology Surgery Clinic  [facility]  [address] [place] [address]  [place] [address]

## 2024-03-20 NOTE — PATIENT INSTRUCTIONS
Wound care    I will experience scar, bleeding, swelling, pain, crusting and redness. I may experience incomplete removal or recurrence. Risks are bleeding, bruising, swelling, infection, nerve damage, & a large wound. A second procedure may be recommended to obtain the best cosmetic or functional result.       A three month office visit with your Surgeon is recommended for scar evaluation. Please reach out sooner if you have concerns about you surgical site/wound.    Caring for your skin after surgery    After your surgery, a pressure bandage will be placed over the area that has stitches. This is important to prevent bleeding. Please follow these instructions over the next 1 to 2 weeks. Following this regimen will help to prevent complications as your wound heals.     For the first 48 hours after your surgery:    Leave the pressure dressing on and keep it dry. If it should come loose, you may re-tape it, but do not take it off.  Relax and take it easy. Do not do any vigorous exercise or heavy lifting. This could cause the wound to bleed.  Post-Operative pain is usually mild. If you are able to take tylenol, You may take plain or extra-strength Tylenol (acetaminophen) As directed on the bottle (do not take more than 4,000mg in one day). If you are able to take ibuprofen, you can alternate the tylenol and ibuprofen.   Avoid alcohol as this may increase your tendency to bleed.   You may put an ice pack around the bandaged area for 20 minutes at a time as needed. This may help reduce swelling, bruising, and pain. Make sure the ice pack is waterproof so that the pressure bandage doesn t get wet.  If the wound is on the face try to sleep with your head elevated. Either in a recliner or propped up in bed, this will decrease swelling around the eyes.   You may see a small amount of drainage or blood on your pressure bandage. This is normal. However:  If drainage or bleeding continues or saturates the bandage, you will  "need to apply firm pressure over the bandage with a piece of gauze for 15 minutes.  If bleeding continues after applying pressure for 15 minutes, apply an ice pack to the bandaged area for 15 minutes.  If bleeding still continues, call our office or go to the nearest emergency room.    Remove pressure dressing 48 hours after surgery:    Carefully remove the pressure bandage. If it seems sticky or too difficult to get off, you may need to soak it off in the shower.  After the pressure dressing is removed, you may shower and get the wound wet. However, Do Not let the forceful stream of the shower hit the wound directly.  Follow these wound care and dressing change instructions:    You have steri strips, skin glue (purplish/clear), and tegaderm (clear film) over your incision line, you can shower.   You may allow water to run over the site. Do not soak.  Do Not rub or scrub the site.  Pat dry after the shower or bath.  Avoid topical medications, lotions, creams, ointment,or oils.  Do not use tanning lamps or expose the site to sun.   Check wound appearance daily, some swelling and redness is normal after a procedure but should go away as your would is healing. If the swelling and redness or pain increases or if any other signs of infection occur listed below please send in a photo via my chart and or call us to let us know.  The clear film should start peeling off approximately around 2 weeks. By this time your wound should be sufficiently healed. If it still looks to be healing when the glue comes off you can clean the wound with soapy warm water daily in the shower and apply Vaseline to the incision line.   Once the clear film starts peeling off to the point where water is getting trapped under the clear film, please gently peel off.        If you are able to take acetaminophen (\"Tylenol,\" etc.) and ibuprofen (\"Advil\" or \"Motrin,\" etc.), then you may STAGGER these medications by taking 400 mg of ibuprofen (usually " two tabs) every 8 hours and 1,000 mg of acetaminophen (e.g., two tabs of extra-strength Tylenol) every 8 hours.    This means, for example, that you could take the followin,000 mg of Tylenol, followed 4 hours later by 400 mg Ibuprofen, followed 4 hours later with 1,000 mg of Tylenol, followed 4 hours later by 400 mg Ibuprofen, followed 4 hours later with 1,000 mg of Tylenol, and so forth.     Essentially, you can take either 1,000 mg of Tylenol or 400 mg of ibuprofen in alternating fashion EVERY FOUR HOURS.    Do NOT exceed more than 4,000 mg of Tylenol or 3,200 mg of ibuprofen per 24 hours. If you are not able to take Tylenol or ibuprofen as above due to other health issues (or a physician has told you directly that you are not allowed to take one of them, say due to pre-existing severe liver or kidney issues), then disregard the above directions.    Scientific evidence supports that this combination/schedule of pain medications is just as effective, if not more effective, than taking a narcotic pain medicine.       Follow up will be a 3 month scar evaluation either in person or via a telephone visit with you sending in a photo via [employer]. Unless you have been told to follow up sooner or if you have concerns and would like to be see sooner. Please call or send us in a [employer] message if possible and attach a photo.        What to expect:    The first couple of days your wound may be tender and may bleed slightly when doing wound care.  There may be swelling and bruising around the wound, especially if it is near the eyes. For your comfort, you may apply ice or cold compresses to the bruises after your have removed the pressure bandage.  The area around your wound may be numb for several weeks or even months.  You may experience periodic sharp pain or mild itching around the wound as it heals.   The suture line will look dark for a while but will lighten over time.       When to call us:    You have bleeding  that will not stop after applying pressure and ice.  You have pain that is not controlled with Tylenol (acetaminophen.)  You have signs or symptoms of an infection such as:  Fever over 100 degrees Fahrenheit  Redness, warmth or foul-smelling drainage from the wound  If you have any questions, or are not sure how to take care of the wound.    Phone numbers:    During business hours (M-F 8:00-4:30 p.m.)  [facility]-  [phone] Option 1 appt. Desk and ask for the Dermatology Surgery Team  [phone] [facility] .     ---------------------------------------------------------  Evenings/Weekends/Holidays  Hospital - [phone]   TTY for hearing qrdexjjx-[phone]  *Ask  to page dermatologist on-call  Emergency [place]-[phone]  TTY for hearing impaired- [phone]

## 2024-03-20 NOTE — LETTER
3/20/2024       RE: Shanelle Sepulveda  [address] [facility]– [place] [address]     Dear Colleague,    Thank you for referring your patient, Shanelle Sepulveda, to the [facility] D[facility]OLOG[facility] SURGERY CLINIC [place] at [facility]. Please see a copy of my visit note below.    DERMATOLOGY EXCISION PROCEDURE NOTE    Dermatology Problem List:  # MM left upper arm and right upper arm, WLE and SNL bx 10/16/23  # MIS, left lower back, s/p shave 2/6/24, s/p exc. 3/20/24  # DN, left distal upper arm, moderate to severe atypia, s/p exc. 3/20/24  # ISK right flank s/p cryotherapy       NAME OF PROCEDURE: Excision intermediate layered linear closure  Staff surgeon: Cheo Christian MD  Fellow surgeon: Mary Banks MD  Scrub Nurse: Saige Correia RN    Case 1    PRE-OPERATIVE DIAGNOSIS:  Melanoma in situ   POST-OPERATIVE DIAGNOSIS: Same   LOCATION: L Lower Back  FINAL EXCISION SIZE(DEFECT SIZE): 3.1 x 2.4 cm  MARGIN: 0.7 cm  FINAL REPAIR LENGTH: 5.3 cm   ANESTHESIA: 9 cc 1% lidocaine with 1:100,000 epinephrine    INDICATIONS: This patient presented with a 1.7 x 1.0 cm Melanoma in situ. Excision was indicated. We discussed the principles of treatment and most likely complications including scarring, bleeding, infection, incomplete excision, wound dehiscence, pain, nerve damage, and recurrence. Informed consent was obtained and the patient underwent the procedure as follows:    PROCEDURE: The patient was taken to the operative suite. Time-out was performed.  The treatment area was anesthetized with 1% lidocaine with epinephrine. The area was prepped with Chlorhexidine and rinsed with sterile saline and draped with sterile towels. The lesion was delineated and excised down to subcutaneous fat in a elliptical manner. Hemostasis was obtained by electrocoagulation.     REPAIR: An intermediate layered linear closure was selected as the procedure which would maximally preserve both  function and cosmesis.    After the excision of the tumor, the area was carefully undermined. Hemostasis was obtained with electrocoagulation.  Closure was oriented so that the wound was in the patient's natural skin tension lines. The subcutaneous and dermal layers were then closed with 3-0 vicryl and 4-0 monocryl sutures. The epidermis was then carefully approximated along the length of the wound using 4-0 monocryl running subcuticular sutures.     Estimated blood loss was less than 10 ml for all surgical sites. A sterile pressure dressing was applied and wound care instructions, with a written handout, were given. The patient was discharged from the [facility] alert and ambulatory.    The patient elected for pathology results to automatically release and understands that the clinical staff will contact them as soon as possible to notify them of the results.    Dr. Cheo Christian was immediately available for the entire surgery and was physicially present for the key portions of the procedure.    Anatomic Pathology Results: pending        Case 2    NAME OF PROCEDURE: Excision intermediate layered linear closure  Staff surgeon: Cheo Christian MD  Fellow surgeon: Mary Banks MD  Scrub Nurse: Saige Correia RN    PRE-OPERATIVE DIAGNOSIS:  Dysplastic nevus  POST-OPERATIVE DIAGNOSIS: Same   LOCATION: L Distal Upper Arm  FINAL EXCISION SIZE(DEFECT SIZE): 2.0 x 2.0 cm  MARGIN: 0.5 cm  FINAL REPAIR LENGTH: 5.0 cm   ANESTHESIA: 9 cc 1% lidocaine with 1:100,000 epinephrine    INDICATIONS: This patient presented with a 1.0 x 1.0 cm Dysplastic nevus. Excision was indicated. We discussed the principles of treatment and most likely complications including scarring, bleeding, infection, incomplete excision, wound dehiscence, pain, nerve damage, and recurrence. Informed consent was obtained and the patient underwent the procedure as follows:    PROCEDURE: The patient was taken to the operative suite. Time-out was  performed.  The treatment area was anesthetized with 1% lidocaine with epinephrine. The area was prepped with Chlorhexidine and rinsed with sterile saline and draped with sterile towels. The lesion was delineated and excised down to subcutaneous fat in a elliptical manner. Hemostasis was obtained by electrocoagulation.     REPAIR: An intermediate layered linear closure was selected as the procedure which would maximally preserve both function and cosmesis.    After the excision of the tumor, the area was carefully undermined. Hemostasis was obtained with electrocoagulation.  Closure was oriented so that the wound was in the patient's natural skin tension lines. The subcutaneous and dermal layers were then closed with 4-0 monocryl sutures. The epidermis was then carefully approximated along the length of the wound using 4-0 monocryl running subcuticular sutures.     Estimated blood loss was less than 10 ml for all surgical sites. A sterile pressure dressing was applied and wound care instructions, with a written handout, were given. The patient was discharged from the [facility] Center alert and ambulatory.    The patient elected for pathology results to automatically release and understands that the clinical staff will contact them as soon as possible to notify them of the results.      Dr. Cheo Christian was immediately available for the entire surgery and was physicially present for the key portions of the procedure.    Anatomic Pathology Results: pending      Clinical Follow-Up: 5/7/24 as scheduled     Mary Banks MD  Micrographic Surgery and Dermatologic Oncology (MSDO) Fellow, PGY-5      Staff Involved:   Scribe/Fellow/Staff    Scribe Disclosure:   PRABHU, ASIM DURAN, am serving as a scribe; to document services personally performed by Cheo Christian MD -based on data collection and the provider's statements to me.       Attending attestation:  I was present for key elements of the procedure and  immediately available for all other portions of the procedure.  I have reviewed the note and edited it as necessary.    Cheo Christian M.D.  Professor  Director of Dermatologic Surgery  Department of Dermatology  [facility]    Dermatology Surgery Clinic  [place] [phone]

## 2024-03-24 ASSESSMENT — ACTIVITIES OF DAILY LIVING (ADL)
GO DOWN STAIRS: ACTIVITY IS SOMEWHAT DIFFICULT
PAIN: THE SYMPTOM AFFECTS MY ACTIVITY SLIGHTLY
KNEEL ON THE FRONT OF YOUR KNEE: ACTIVITY IS NOT DIFFICULT
HOW_WOULD_YOU_RATE_THE_OVERALL_FUNCTION_OF_YOUR_KNEE_DURING_YOUR_USUAL_DAILY_ACTIVITIES?: ABNORMAL
WALK: ACTIVITY IS SOMEWHAT DIFFICULT
KNEEL ON THE FRONT OF YOUR KNEE: ACTIVITY IS NOT DIFFICULT
GIVING WAY, BUCKLING OR SHIFTING OF KNEE: THE SYMPTOM AFFECTS MY ACTIVITY SLIGHTLY
SWELLING: I HAVE THE SYMPTOM BUT IT DOES NOT AFFECT MY ACTIVITY
SIT WITH YOUR KNEE BENT: ACTIVITY IS NOT DIFFICULT
RISE FROM A CHAIR: ACTIVITY IS MINIMALLY DIFFICULT
GO UP STAIRS: ACTIVITY IS SOMEWHAT DIFFICULT
PAIN: THE SYMPTOM AFFECTS MY ACTIVITY SLIGHTLY
GO UP STAIRS: ACTIVITY IS SOMEWHAT DIFFICULT
AS_A_RESULT_OF_YOUR_KNEE_INJURY,_HOW_WOULD_YOU_RATE_YOUR_CURRENT_LEVEL_OF_DAILY_ACTIVITY?: ABNORMAL
GO DOWN STAIRS: ACTIVITY IS SOMEWHAT DIFFICULT
LIMPING: I DO NOT HAVE THE SYMPTOM
GIVING WAY, BUCKLING OR SHIFTING OF KNEE: THE SYMPTOM AFFECTS MY ACTIVITY SLIGHTLY
STIFFNESS: I DO NOT HAVE THE SYMPTOM
RAW_SCORE: 57
WEAKNESS: I DO NOT HAVE THE SYMPTOM
KNEE_ACTIVITY_OF_DAILY_LIVING_SUM: 57
SQUAT: ACTIVITY IS MINIMALLY DIFFICULT
RISE FROM A CHAIR: ACTIVITY IS MINIMALLY DIFFICULT
WEAKNESS: I DO NOT HAVE THE SYMPTOM
HOW_WOULD_YOU_RATE_THE_CURRENT_FUNCTION_OF_YOUR_KNEE_DURING_YOUR_USUAL_DAILY_ACTIVITIES_ON_A_SCALE_FROM_0_TO_100_WITH_100_BEING_YOUR_LEVEL_OF_KNEE_FUNCTION_PRIOR_TO_YOUR_INJURY_AND_0_BEING_THE_INABILITY_TO_PERFORM_ANY_OF_YOUR_USUAL_DAILY_ACTIVITIES?: 70
STIFFNESS: I DO NOT HAVE THE SYMPTOM
SQUAT: ACTIVITY IS MINIMALLY DIFFICULT
STAND: ACTIVITY IS NOT DIFFICULT
HOW_WOULD_YOU_RATE_THE_OVERALL_FUNCTION_OF_YOUR_KNEE_DURING_YOUR_USUAL_DAILY_ACTIVITIES?: ABNORMAL
KNEE_ACTIVITY_OF_DAILY_LIVING_SCORE: 81.43
STAND: ACTIVITY IS NOT DIFFICULT
AS_A_RESULT_OF_YOUR_KNEE_INJURY,_HOW_WOULD_YOU_RATE_YOUR_CURRENT_LEVEL_OF_DAILY_ACTIVITY?: ABNORMAL
PLEASE_INDICATE_YOR_PRIMARY_REASON_FOR_REFERRAL_TO_THERAPY:: KNEE
HOW_WOULD_YOU_RATE_THE_CURRENT_FUNCTION_OF_YOUR_KNEE_DURING_YOUR_USUAL_DAILY_ACTIVITIES_ON_A_SCALE_FROM_0_TO_100_WITH_100_BEING_YOUR_LEVEL_OF_KNEE_FUNCTION_PRIOR_TO_YOUR_INJURY_AND_0_BEING_THE_INABILITY_TO_PERFORM_ANY_OF_YOUR_USUAL_DAILY_ACTIVITIES?: 70
LIMPING: I DO NOT HAVE THE SYMPTOM
WALK: ACTIVITY IS SOMEWHAT DIFFICULT
SIT WITH YOUR KNEE BENT: ACTIVITY IS NOT DIFFICULT
SWELLING: I HAVE THE SYMPTOM BUT IT DOES NOT AFFECT MY ACTIVITY

## 2024-03-25 ENCOUNTER — THERAPY VISIT (OUTPATIENT)
Dept: PHYSICAL THERAPY | Facility: CLINIC | Age: 74
End: 2024-03-25
Attending: PHYSICIAN ASSISTANT
Payer: COMMERCIAL

## 2024-03-25 DIAGNOSIS — M17.12 PRIMARY OSTEOARTHRITIS OF LEFT KNEE: ICD-10-CM

## 2024-03-25 LAB
PATH REPORT.COMMENTS IMP SPEC: NORMAL
PATH REPORT.COMMENTS IMP SPEC: NORMAL
PATH REPORT.FINAL DX SPEC: NORMAL
PATH REPORT.GROSS SPEC: NORMAL
PATH REPORT.MICROSCOPIC SPEC OTHER STN: NORMAL
PATH REPORT.RELEVANT HX SPEC: NORMAL

## 2024-03-25 PROCEDURE — 97161 PT EVAL LOW COMPLEX 20 MIN: CPT | Mod: GP

## 2024-03-25 PROCEDURE — 97110 THERAPEUTIC EXERCISES: CPT | Mod: GP

## 2024-03-25 NOTE — PROGRESS NOTES
"PHYSICAL THERAPY EVALUATION  Type of Visit: Evaluation    See electronic medical record for Abuse and Falls Screening details.    Subjective       Presenting condition or subjective complaint: Mckenzie seen today for intermittent buckling of L knee during ambulation. She explained that problem began 3/5/24 w/ no noted BOUCHRA. She denies pain, locking or clicking in L knee, & reports no hx of falls d/t buckling. She does note some swelling in L knee that she elucidates can go down to her ankle sometimes. She hopes to be able to walk w/out sudden buckling to keep up with her grandkids (ages 3 & younger). She also feels she cannot kneel well, only kneeling for Religious as able. Recent XR indicates mild medial & patellofemoral compartment OA (no other concerns noted ~ please refer to imaging for full report).     Date of onset: 03/14/24 (date of referral)    Relevant medical history: High blood pressure; Overweight   Dates & types of surgery: 10/23 derm surgery; 3/24 derm surgery.    Prior diagnostic imaging/testing results: Other Scan done recently for current knee issue.   Prior therapy history for the same diagnosis, illness or injury: No      Prior Level of Function  Transfers: Independent  Ambulation: Independent  ADL: Independent    Living Environment  Social support: With a significant other or spouse   Type of home: [facility]   Stairs to enter the home: No       Ramp: No   Stairs inside the home: Yes 14 Is there a railing: Yes   Help at home: None  Equipment owned:       Employment: No    Hobbies/Interests: Cooking, interacting with family.    Patient goals for therapy: Walk/run without my knee giving out.    Pain assessment: Pain denied     Objective   KNEE EVALUATION  PAIN: pt denies pn in L knee  INTEGUMENTARY (edema, incisions):  Mild non-pitting edema noted primarily in medial suprapatellar region L LE. R knee @ suprapatellar region: 16\", L knee @ same location: 16 3/4\"  POSTURE: Standing Posture: Rounded shoulders, " "Forward head  GAIT:  Gait Deviations: Stride length decreased bilaterally, not achieving full knee ext during L LE stance phase  BALANCE/PROPRIOCEPTION:  assess further at next visit  WEIGHTBEARING ALIGNMENT: WFL  ROM:   (Degrees) Left AROM Left PROM  Right AROM Right PROM   Knee Flexion  124  130   Knee Extension  5 (off of full ext)  0   Pain: no pn noted, mild discomfort w/ pt noting sensation of \"pulling\" during PROM of L knee    STRENGTH:   Pain: - none + mild ++ moderate +++ severe  Strength Scale: 0-5/5 Left Right   Knee Flexion 4 4   Knee Extension 4 4   Hip Abd: R = 4, L = 3+  Hip Fl: R = 4, L = 3+  Dorsiflexion: R & L = 5  Plantarflexion: R & L = 5    FUNCTIONAL TESTS: Double Leg Squat: Anterior knee translation, Quadriceps avoidance squatting pattern, Improper use of glutes/hips, and With heels elevated , able to perform w/ vc to keep heels planted but w/ reduced squat ROM  Single Leg Squat: Anterior knee translation, Knee valgus, Hip internal rotation, Contralateral hip drop, Quadriceps avoidance squatting pattern, and Improper use of glutes/hips  PALPATION:  denies TTP w/ patellar glides, palpation of L knee medial & lateral joint line  JOINT MOBILITY:    Left Right     Patellofemoral Medial WNL WNL   Patellofemoral Lateral WNL WNL   Patellofemoral Superior Hypomobile Hypomobile   Patellofemoral Inferior Hypomobile Hypomobile       Assessment & Plan   CLINICAL IMPRESSIONS  Medical Diagnosis: Primary osteoarthritis of left knee (M17.12)  - Primary    Treatment Diagnosis: L Knee Pain   Impression/Assessment: Patient is a 73 year old female with L knee buckling/weakness complaints.  The following significant findings have been identified: Decreased ROM/flexibility, Decreased joint mobility, Decreased strength, Edema, Impaired gait, Impaired muscle performance, and Decreased activity tolerance. These impairments interfere with their ability to perform self care tasks, recreational activities, household " chores, household mobility, and community mobility as compared to previous level of function.     Clinical Decision Making (Complexity):  Clinical Presentation: Stable/Uncomplicated  Clinical Presentation Rationale: based on medical and personal factors listed in PT evaluation  Clinical Decision Making (Complexity): Low complexity    PLAN OF CARE  Treatment Interventions:  Modalities: Cryotherapy, Hot Pack  Interventions: Gait Training, Manual Therapy, Neuromuscular Re-education, Therapeutic Activity, Therapeutic Exercise, Self-Care/Home Management    Long Term Goals     PT Goal 1  Goal Identifier: Ambulation  Goal Description: Pt will be able to walk for at least 1 hr without L knee buckling in order to be able to safely caretake for grandchildren & ambulate safely in community.  Rationale: to maximize safety and independence with performance of ADLs and functional tasks;to maximize safety and independence within the home;to maximize safety and independence within the community;to maximize safety and independence with self cares  Target Date: 05/20/24  PT Goal 2  Goal Identifier: Knee ROM  Goal Description: Pt will be able to fully extend L knee in order to promote a normal gait pattern & reduce risks of falls d/t knee buckling when at home.  Rationale: to maximize safety and independence with performance of ADLs and functional tasks;to maximize safety and independence within the home;to maximize safety and independence with self cares  Target Date: 05/20/24      Frequency of Treatment: 1 x weekly, taper as clinically appropriate  Duration of Treatment: 8 weeks    Recommended Referrals to Other Professionals:  no referrals indicated at this time.  Education Assessment:        Risks and benefits of evaluation/treatment have been explained.   Patient/Family/caregiver agrees with Plan of Care.     Evaluation Time:     PT Eval, Low Complexity Minutes (92845): 20  Signing Clinician: CÉSAR Soto  [facility]                                                                                   OUTPATIENT PHYSICAL THERAPY      PLAN OF TREATMENT FOR OUTPATIENT REHABILITATION   Patient's Last Name, First Name, Shanelle Parra YOB: 1950   Provider's Name   [facility]   Medical Record No.  [id number]     Onset Date: 03/14/24 (date of referral)  Start of Care Date: 03/25/24     Medical Diagnosis:  Primary osteoarthritis of left knee (M17.12)  - Primary      PT Treatment Diagnosis:  L Knee Pain Plan of Treatment  Frequency/Duration: 1 x weekly, taper as clinically appropriate/ 8 weeks    Certification date from 03/25/24 to 06/22/24         See note for plan of treatment details and functional goals     Erasto Rivera, PT                         I CERTIFY THE NEED FOR THESE SERVICES FURNISHED UNDER        THIS PLAN OF TREATMENT AND WHILE UNDER MY CARE     (Physician attestation of this document indicates review and certification of the therapy plan).              Referring Provider:  Debra Mccullough    Initial Assessment  See Epic Evaluation- Start of Care Date: 03/25/24

## 2024-04-01 ENCOUNTER — PATIENT OUTREACH (OUTPATIENT)
Dept: INTERNAL MEDICINE | Facility: CLINIC | Age: 74
End: 2024-04-01
Payer: COMMERCIAL

## 2024-04-01 NOTE — TELEPHONE ENCOUNTER
Patient Quality Outreach    Patient is due for the following:   Hypertension -  Hypertension follow-up visit (Med change )    Next Steps:   Schedule a office visit for BP    Type of outreach:    Phone, spoke to patient/parent. Has upcoming appointment.       Questions for provider review:    None           Leeann Diaz LPN  Chart routed to none.

## 2024-04-02 ENCOUNTER — THERAPY VISIT (OUTPATIENT)
Dept: PHYSICAL THERAPY | Facility: CLINIC | Age: 74
End: 2024-04-02
Payer: COMMERCIAL

## 2024-04-02 DIAGNOSIS — M17.12 PRIMARY OSTEOARTHRITIS OF LEFT KNEE: Primary | ICD-10-CM

## 2024-04-02 PROCEDURE — 97530 THERAPEUTIC ACTIVITIES: CPT | Mod: GP | Performed by: PHYSICAL THERAPIST

## 2024-04-02 PROCEDURE — 97110 THERAPEUTIC EXERCISES: CPT | Mod: GP | Performed by: PHYSICAL THERAPIST

## 2024-04-09 ENCOUNTER — THERAPY VISIT (OUTPATIENT)
Dept: PHYSICAL THERAPY | Facility: CLINIC | Age: 74
End: 2024-04-09
Payer: COMMERCIAL

## 2024-04-09 DIAGNOSIS — M17.12 PRIMARY OSTEOARTHRITIS OF LEFT KNEE: Primary | ICD-10-CM

## 2024-04-09 PROCEDURE — 97110 THERAPEUTIC EXERCISES: CPT | Mod: GP | Performed by: PHYSICAL THERAPIST

## 2024-04-09 ASSESSMENT — ACTIVITIES OF DAILY LIVING (ADL)
PAIN: I HAVE THE SYMPTOM BUT IT DOES NOT AFFECT MY ACTIVITY
STIFFNESS: I DO NOT HAVE THE SYMPTOM
STAND: ACTIVITY IS NOT DIFFICULT
KNEE_ACTIVITY_OF_DAILY_LIVING_SCORE: 91.43
KNEE_ACTIVITY_OF_DAILY_LIVING_SUM: 64
GO DOWN STAIRS: ACTIVITY IS SOMEWHAT DIFFICULT
SQUAT: ACTIVITY IS NOT DIFFICULT
AS_A_RESULT_OF_YOUR_KNEE_INJURY,_HOW_WOULD_YOU_RATE_YOUR_CURRENT_LEVEL_OF_DAILY_ACTIVITY?: NEARLY NORMAL
GO UP STAIRS: ACTIVITY IS MINIMALLY DIFFICULT
SIT WITH YOUR KNEE BENT: ACTIVITY IS NOT DIFFICULT
WALK: ACTIVITY IS NOT DIFFICULT
KNEEL ON THE FRONT OF YOUR KNEE: ACTIVITY IS NOT DIFFICULT
HOW_WOULD_YOU_RATE_THE_CURRENT_FUNCTION_OF_YOUR_KNEE_DURING_YOUR_USUAL_DAILY_ACTIVITIES_ON_A_SCALE_FROM_0_TO_100_WITH_100_BEING_YOUR_LEVEL_OF_KNEE_FUNCTION_PRIOR_TO_YOUR_INJURY_AND_0_BEING_THE_INABILITY_TO_PERFORM_ANY_OF_YOUR_USUAL_DAILY_ACTIVITIES?: 80
GIVING WAY, BUCKLING OR SHIFTING OF KNEE: I DO NOT HAVE THE SYMPTOM
RISE FROM A CHAIR: ACTIVITY IS NOT DIFFICULT
RAW_SCORE: 64
LIMPING: I DO NOT HAVE THE SYMPTOM
WEAKNESS: I HAVE THE SYMPTOM BUT IT DOES NOT AFFECT MY ACTIVITY
HOW_WOULD_YOU_RATE_THE_OVERALL_FUNCTION_OF_YOUR_KNEE_DURING_YOUR_USUAL_DAILY_ACTIVITIES?: NEARLY NORMAL
SWELLING: I HAVE THE SYMPTOM BUT IT DOES NOT AFFECT MY ACTIVITY

## 2024-04-09 NOTE — PROGRESS NOTES
PT DISCHARGE  Reason for Discharge: Patient has met all goals.       04/09/24 0500   Appointment Info   Signing clinician's name / credentials Maryjane Carter PT, DPT   Total/Authorized Visits eval & treat   Visits Used 3   Medical Diagnosis Primary osteoarthritis of left knee (M17.12)  - Primary   PT Tx Diagnosis L Knee Pain   Other pertinent information Referring provider would like to see after 8wks if sxs not improved.   Quick Adds Certification   Progress Note/Certification   Start of Care Date 03/25/24   Onset of illness/injury or Date of Surgery 03/14/24  (date of referral)   Therapy Frequency 1 x weekly, taper as clinically appropriate   Predicted Duration 8 weeks   Certification date from 03/25/24   Certification date to 06/22/24   Progress Note Due Date 05/23/24   GOALS   PT Goals 2   PT Goal 1   Goal Identifier Ambulation   Goal Description Pt will be able to walk for at least 1 hr without L knee buckling in order to be able to safely caretake for grandchildren & ambulate safely in community.   Rationale to maximize safety and independence with performance of ADLs and functional tasks;to maximize safety and independence within the home;to maximize safety and independence within the community;to maximize safety and independence with self cares   Goal Progress able to walk 1 hour in the store   Target Date 05/20/24   Date Met 04/09/24   PT Goal 2   Goal Identifier Knee ROM   Goal Description Pt will be able to fully extend L knee in order to promote a normal gait pattern & reduce risks of falls d/t knee buckling when at home.   Rationale to maximize safety and independence with performance of ADLs and functional tasks;to maximize safety and independence within the home;to maximize safety and independence with self cares   Goal Progress met   Target Date 05/20/24   Date Met 04/09/24   Subjective Report   Subjective Report Pt reports that overall things are going well. She has biked x3 since the last session for  30 minutes each. She is completing her RTB clamshell every other day. Continues to feel lmuscles working but states it is an appropriate challenge. Notes no instability events for 2+ weeks.   Objective Measures   Objective Measures Objective Measure 1   Objective Measure 1   Objective Measure Knee ROM   Details WNL bilateral   Therapeutic Procedure/Exercise   Therapeutic Procedures: strength, endurance, ROM, flexibility minutes (22016) 27   Ther Proc 1 Clamshell   Ther Proc 1 - Details RTB complete x20   PTRx Ther Proc 1 Passive Knee Extension Stretch   PTRx Ther Proc 1 - Details 1x5-10min 2-3 x daily- has not completed, pause HEP due to improving motion   PTRx Ther Proc 2 Hip Flexion Straight Leg Raise   PTRx Ther Proc 2 - Details 1x20, some musculat fatigue   PTRx Ther Proc 3 Gluteal Myofascial Full Arc   PTRx Ther Proc 3 - Details 1x20; modified to complete clamshell due to improved understaning of the motion an dproper set up   Skilled Intervention review HEP, cues, progress to functional tasks   Patient Response/Progress progression, tolerated well   Ther Proc 2 Stairs   Ther Proc 2 - Details ascending and descending stairs with reiprocal pattern   Ther Proc 3 Biking   Ther Proc 3 - Details daily activities, continue 3-4x per week 30 minutes each   Therapeutic Procedures Ther Proc 2;Ther Proc 3   Education   Learner/Method No Barriers to Learning   Education Comments HEP establishment, continued activity education   Plan   Home program Refer to PTRx- print out   Updates to plan of care continue HEP independently, d/c       Discharge Plan: Patient to continue home program.    Referring Provider:  Debra Mccullough

## 2024-04-11 ENCOUNTER — OFFICE VISIT (OUTPATIENT)
Dept: FAMILY MEDICINE | Facility: CLINIC | Age: 74
End: 2024-04-11
Payer: COMMERCIAL

## 2024-04-11 VITALS
SYSTOLIC BLOOD PRESSURE: 148 MMHG | TEMPERATURE: 98 F | OXYGEN SATURATION: 97 % | HEIGHT: 63 IN | DIASTOLIC BLOOD PRESSURE: 84 MMHG | RESPIRATION RATE: 13 BRPM | WEIGHT: 197 LBS | HEART RATE: 78 BPM | BODY MASS INDEX: 34.91 KG/M2

## 2024-04-11 DIAGNOSIS — C43.61 MALIGNANT MELANOMA OF SKIN OF UPPER LIMB, INCLUDING SHOULDER, RIGHT (H): ICD-10-CM

## 2024-04-11 DIAGNOSIS — E78.5 HYPERLIPIDEMIA WITH TARGET LDL LESS THAN 130: ICD-10-CM

## 2024-04-11 DIAGNOSIS — H25.9 AGE-RELATED CATARACT OF BOTH EYES, UNSPECIFIED AGE-RELATED CATARACT TYPE: ICD-10-CM

## 2024-04-11 DIAGNOSIS — F41.1 GENERALIZED ANXIETY DISORDER: ICD-10-CM

## 2024-04-11 DIAGNOSIS — Z80.3 FAMILY HISTORY OF MALIGNANT NEOPLASM OF BREAST: ICD-10-CM

## 2024-04-11 DIAGNOSIS — I10 HTN, GOAL BELOW 140/90: ICD-10-CM

## 2024-04-11 DIAGNOSIS — Z01.818 PREOPERATIVE EXAMINATION: Primary | ICD-10-CM

## 2024-04-11 DIAGNOSIS — F33.42 RECURRENT MAJOR DEPRESSIVE DISORDER, IN FULL REMISSION (H): ICD-10-CM

## 2024-04-11 DIAGNOSIS — Z51.81 MEDICATION MONITORING ENCOUNTER: ICD-10-CM

## 2024-04-11 PROBLEM — E66.01 MORBID OBESITY (H): Status: RESOLVED | Noted: 2021-08-16 | Resolved: 2024-04-11

## 2024-04-11 LAB
ALBUMIN SERPL BCG-MCNC: 4.7 G/DL (ref 3.5–5.2)
ALBUMIN UR-MCNC: NEGATIVE MG/DL
ALP SERPL-CCNC: 65 U/L (ref 40–150)
ALT SERPL W P-5'-P-CCNC: 39 U/L (ref 0–50)
ANION GAP SERPL CALCULATED.3IONS-SCNC: 12 MMOL/L (ref 7–15)
APPEARANCE UR: CLEAR
AST SERPL W P-5'-P-CCNC: 30 U/L (ref 0–45)
BILIRUB SERPL-MCNC: 0.3 MG/DL
BILIRUB UR QL STRIP: NEGATIVE
BUN SERPL-MCNC: 18.6 MG/DL (ref 8–23)
CALCIUM SERPL-MCNC: 10.2 MG/DL (ref 8.8–10.2)
CHLORIDE SERPL-SCNC: 105 MMOL/L (ref 98–107)
COLOR UR AUTO: YELLOW
CREAT SERPL-MCNC: 0.9 MG/DL (ref 0.51–0.95)
DEPRECATED HCO3 PLAS-SCNC: 26 MMOL/L (ref 22–29)
EGFRCR SERPLBLD CKD-EPI 2021: 67 ML/MIN/1.73M2
ERYTHROCYTE [DISTWIDTH] IN BLOOD BY AUTOMATED COUNT: 13 % (ref 10–15)
GLUCOSE SERPL-MCNC: 104 MG/DL (ref 70–99)
GLUCOSE UR STRIP-MCNC: NEGATIVE MG/DL
HCT VFR BLD AUTO: 41.4 % (ref 35–47)
HGB BLD-MCNC: 14 G/DL (ref 11.7–15.7)
HGB UR QL STRIP: NEGATIVE
KETONES UR STRIP-MCNC: NEGATIVE MG/DL
LEUKOCYTE ESTERASE UR QL STRIP: ABNORMAL
MCH RBC QN AUTO: 31.5 PG (ref 26.5–33)
MCHC RBC AUTO-ENTMCNC: 33.8 G/DL (ref 31.5–36.5)
MCV RBC AUTO: 93 FL (ref 78–100)
NITRATE UR QL: NEGATIVE
PH UR STRIP: 5.5 [PH] (ref 5–7)
PLATELET # BLD AUTO: 242 10E3/UL (ref 150–450)
POTASSIUM SERPL-SCNC: 4.4 MMOL/L (ref 3.4–5.3)
PROT SERPL-MCNC: 7.5 G/DL (ref 6.4–8.3)
RBC # BLD AUTO: 4.44 10E6/UL (ref 3.8–5.2)
RBC #/AREA URNS AUTO: ABNORMAL /HPF
SODIUM SERPL-SCNC: 143 MMOL/L (ref 135–145)
SP GR UR STRIP: >=1.03 (ref 1–1.03)
SQUAMOUS #/AREA URNS AUTO: ABNORMAL /LPF
UROBILINOGEN UR STRIP-ACNC: 0.2 E.U./DL
WBC # BLD AUTO: 3.8 10E3/UL (ref 4–11)
WBC #/AREA URNS AUTO: ABNORMAL /HPF

## 2024-04-11 PROCEDURE — 81001 URINALYSIS AUTO W/SCOPE: CPT | Performed by: FAMILY MEDICINE

## 2024-04-11 PROCEDURE — 85027 COMPLETE CBC AUTOMATED: CPT | Performed by: FAMILY MEDICINE

## 2024-04-11 PROCEDURE — 93000 ELECTROCARDIOGRAM COMPLETE: CPT | Performed by: FAMILY MEDICINE

## 2024-04-11 PROCEDURE — 96127 BRIEF EMOTIONAL/BEHAV ASSMT: CPT | Performed by: FAMILY MEDICINE

## 2024-04-11 PROCEDURE — 36415 COLL VENOUS BLD VENIPUNCTURE: CPT | Performed by: FAMILY MEDICINE

## 2024-04-11 PROCEDURE — 80053 COMPREHEN METABOLIC PANEL: CPT | Performed by: FAMILY MEDICINE

## 2024-04-11 PROCEDURE — 99214 OFFICE O/P EST MOD 30 MIN: CPT | Mod: 25 | Performed by: FAMILY MEDICINE

## 2024-04-11 RX ORDER — PREDNISOLONE ACETATE 10 MG/ML
SUSPENSION/ DROPS OPHTHALMIC
COMMUNITY
Start: 2024-04-10

## 2024-04-11 RX ORDER — MOXIFLOXACIN 5 MG/ML
SOLUTION/ DROPS OPHTHALMIC
COMMUNITY
Start: 2024-04-10

## 2024-04-11 RX ORDER — KETOROLAC TROMETHAMINE 5 MG/ML
SOLUTION OPHTHALMIC
COMMUNITY
Start: 2024-04-10

## 2024-04-11 ASSESSMENT — PATIENT HEALTH QUESTIONNAIRE - PHQ9
10. IF YOU CHECKED OFF ANY PROBLEMS, HOW DIFFICULT HAVE THESE PROBLEMS MADE IT FOR YOU TO DO YOUR WORK, TAKE CARE OF THINGS AT HOME, OR GET ALONG WITH OTHER PEOPLE: NOT DIFFICULT AT ALL
SUM OF ALL RESPONSES TO PHQ QUESTIONS 1-9: 0
SUM OF ALL RESPONSES TO PHQ QUESTIONS 1-9: 0

## 2024-04-11 ASSESSMENT — ANXIETY QUESTIONNAIRES
5. BEING SO RESTLESS THAT IT IS HARD TO SIT STILL: NOT AT ALL
2. NOT BEING ABLE TO STOP OR CONTROL WORRYING: NOT AT ALL
GAD7 TOTAL SCORE: 0
1. FEELING NERVOUS, ANXIOUS, OR ON EDGE: NOT AT ALL
3. WORRYING TOO MUCH ABOUT DIFFERENT THINGS: NOT AT ALL
8. IF YOU CHECKED OFF ANY PROBLEMS, HOW DIFFICULT HAVE THESE MADE IT FOR YOU TO DO YOUR WORK, TAKE CARE OF THINGS AT HOME, OR GET ALONG WITH OTHER PEOPLE?: NOT DIFFICULT AT ALL
4. TROUBLE RELAXING: NOT AT ALL
7. FEELING AFRAID AS IF SOMETHING AWFUL MIGHT HAPPEN: NOT AT ALL
7. FEELING AFRAID AS IF SOMETHING AWFUL MIGHT HAPPEN: NOT AT ALL
6. BECOMING EASILY ANNOYED OR IRRITABLE: NOT AT ALL
IF YOU CHECKED OFF ANY PROBLEMS ON THIS QUESTIONNAIRE, HOW DIFFICULT HAVE THESE PROBLEMS MADE IT FOR YOU TO DO YOUR WORK, TAKE CARE OF THINGS AT HOME, OR GET ALONG WITH OTHER PEOPLE: NOT DIFFICULT AT ALL

## 2024-04-11 NOTE — PROGRESS NOTES
Preoperative Evaluation    [address] [address]  Phone: [phone]  Primary Provider: Anna Valerio  Pre-op Performing Provider: AMMY THOMPSON  2024       Mckenzie is a 73 year old, presenting for the followin/11/2024    10:34 AM   Additional Questions   Roomed by Gavino KENNEDY CMA     Surgical Information  Surgery/Procedure: Cataracts  Surgery Location: [facility]  Surgeon: Dr. Charbel Enciso  Surgery Date: 12:00 PM  Time of Surgery: 2024 - Right Eye, 2024 Left Eye  Where patient plans to recover: At home with family  Fax number for surgical facility: [phone]    Assessment & Plan     The proposed surgical procedure is considered LOW risk.    Preoperative examination    - Comprehensive metabolic panel  - CBC with platelets  - UA Macroscopic with reflex to Microscopic and Culture  - EKG 12-lead complete w/read - Clinics  - Comprehensive metabolic panel  - CBC with platelets  - UA Macroscopic with reflex to Microscopic and Culture    Age-related cataract of both eyes, unspecified age-related cataract type      HTN, goal below 140/90    - Comprehensive metabolic panel  - CBC with platelets  - UA Macroscopic with reflex to Microscopic and Culture  - EKG 12-lead complete w/read - Clinics  - Comprehensive metabolic panel  - CBC with platelets  - UA Macroscopic with reflex to Microscopic and Culture    Hyperlipidemia with target LDL less than 130    - Comprehensive metabolic panel  - Comprehensive metabolic panel    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, including shoulder, right (H)      Recurrent major depressive disorder, in full remission (H24)      Generalized anxiety disorder      Family history of malignant neoplasm of breast      Medication monitoring encounter    - Comprehensive metabolic panel  - CBC with platelets  - UA Macroscopic with reflex to Microscopic and Culture  - EKG 12-lead complete  w/read - Clinics  - Comprehensive metabolic panel  - CBC with platelets  - UA Macroscopic with reflex to Microscopic and Culture    Possible Sleep Apnea: considering testing      Implanted Device  none    Risks and Recommendations  The patient has the following additional risks and recommendations for perioperative complications:   - No identified additional risk factors other than previously addressed    Antiplatelet or Anticoagulation Medication Instructions  Hold ASA 5-7 days prior    Additional Medication Instructions  Hold lisinopril 1 day prior  Ok to take metoprolol and pravastatin day of surgery    Recommendation  APPROVAL GIVEN to proceed with proposed procedure, without further diagnostic evaluation.    Prescription drug management    24 minutes spent by me on the date of the encounter doing chart review, history and exam, documentation and further activities per the note    Subjective     HPI related to upcoming procedure:     Bilateral cataracts - IOL with Dr Enciso        9/26/2023     8:27 AM   Preop Questions   1. Have you ever had a heart attack or stroke? No   2. Have you ever had surgery on your heart or blood vessels, such as a stent placement, a coronary artery bypass, or surgery on an artery in your head, neck, heart, or legs? No   3. Do you have chest pain with activity? No   4. Do you have a history of  heart failure? No   5. Do you currently have a cold, bronchitis or symptoms of other infection? No   6. Do you have a cough, shortness of breath, or wheezing? No   7. Do you or anyone in your family have previous history of blood clots? No   8. Do you or does anyone in your family have a serious bleeding problem such as prolonged bleeding following surgeries or cuts? No   9. Have you ever had problems with anemia or been told to take iron pills? No   10. Have you had any abnormal blood loss such as black, tarry or bloody stools, or abnormal vaginal bleeding? No   11. Have you ever had a blood  transfusion? No   12. Are you willing to have a blood transfusion if it is medically needed before, during, or after your surgery? Yes   13. Have you or any of your relatives ever had problems with anesthesia? No   14. Do you have sleep apnea, excessive snoring or daytime drowsiness? YES - snoring   14a. Do you have a CPAP machine? No   15. Do you have any artifical heart valves or other implanted medical devices like a pacemaker, defibrillator, or continuous glucose monitor? No   16. Do you have artificial joints? No   17. Are you allergic to latex? No     Health Care Directive  Patient has a Health Care Directive on file    Preoperative Review of    reviewed - no record of controlled substances prescribed.      Status of Chronic Conditions:    Htn    BP Readings from Last 3 Encounters:   04/11/24 (!) 148/84   03/20/24 (!) 164/88   12/11/23 (!) 172/92     Creatinine   Date Value Ref Range Status   12/11/2023 0.93 0.51 - 0.95 mg/dL Final   08/10/2020 0.82 0.52 - 1.04 mg/dL Final     GFR Estimate   Date Value Ref Range Status   12/11/2023 65 >60 mL/min/1.73m2 Final   08/10/2020 73 >60 mL/min/[1.73_m2] Final     Comment:     Non  GFR Calc  Starting 12/18/2018, serum creatinine based estimated GFR (eGFR) will be   calculated using the Chronic Kidney Disease Epidemiology Collaboration   (CKD-EPI) equation.       Lipids    Recent Labs   Lab Test 12/11/23  1458 10/19/22  1000   CHOL 216* 192   HDL 53 49*   * 112*   TRIG 153* 153*     Melanoma - Dermatology every 3 months    Depression / Anxiety        10/1/2023     1:37 PM 12/11/2023     2:09 PM 4/11/2024    10:32 AM   PHQ   PHQ-9 Total Score 1 4 0   Q9: Thoughts of better off dead/self-harm past 2 weeks Not at all Not at all Not at all           4/29/2019     8:52 AM 9/28/2021    10:12 AM 4/11/2024    10:33 AM   CHONG-7 SCORE   Total Score  0 (minimal anxiety) 0 (minimal anxiety)   Total Score 0 0 0       Patient Active Problem List     "Diagnosis Date Noted    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, including shoulder, left (H) 09/21/2023     Priority: Medium    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, including shoulder, right (H) 09/21/2023     Priority: Medium     Check 12.23 for follow-up Lucía      Major depression in complete remission (H24) 01/18/2022     Priority: Medium    Cervical cancer screening 01/09/2018     Priority: Medium     Criteria necessary to stop screening per ASCCP guidelines:  Older than 65 years  Three consecutive negative cytology results or two consecutive negative cotest results within the previous 10 years, with the most recent being performed within the last 5 years.   (Women with hx of EMMETT 2 or 3, or adenocarcinoma in situ should continue screening for full 20 years, even if this goes past age 65).    Lab Results   Component Value Date    PAP NIL / neg HPV 01/02/2018    PAP NIL 04/22/2013    PAP NIL 12/03/2010 1/2018: HM updated, Pap screening discontinued/ck      Family history of malignant neoplasm of breast 12/08/2015     Priority: Medium    Urgency-frequency syndrome 06/19/2015     Priority: Medium     2011: urinary urgency, frquency and leakage. She has to void every 2 hours, seems to produce normal volumes and feels she empties well; she leakage with stress type, small amounts generally; she also has nocturia, every 2 hours. She has vaginal dryness; given vaginal estrogen cream and Ditropan XL      HTN, goal below 140/90 03/19/2014     Priority: Medium    Hyperlipidemia with target LDL less than 130 02/12/2014     Priority: Medium     Diagnosis updated by automated process. Provider to review and confirm.      Overactive bladder 11/21/2011     Priority: Medium    Atrophic vaginitis 11/21/2011     Priority: Medium    Advanced directives, counseling/discussion 06/27/2011     Priority: Medium     Patient does not have an Advance/Health Care Directive (HCD), given \"What is Advance Care Planning?\" flyer and " requests blank HCD form.    Sera Lawrence  June 27, 2011        Generalized anxiety disorder 12/22/2009     Priority: Medium     Controlled on citalopram since 12/09        Past Medical History:   Diagnosis Date    Intramural leiomyoma of uterus     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, including shoulder, left (H) 9/21/2023    MENOPAUSAL DISORDER NOS 8/10/2005    Menses getting much heavier, pelvic sono 02/06--2 uterine fibroids seen, 1.5 and 2.7cm each; simple left ovarian cyst 4.2 cm, 2.6 cm complex right ovarian cyst--endometrial biopsy benign    Symptomatic menopausal or female climacteric states      Past Surgical History:   Procedure Laterality Date    BIOPSY NODE SENTINEL Right 10/16/2023    Procedure: RIGHT SENTINEL LYMPH NODE BIOPSY;  Surgeon: Roney Castrejon MD;  Location: [facility] OR    COLONOSCOPY  02/17/2004    wnl    COLONOSCOPY N/A 6/16/2016    Procedure: COLONOSCOPY;  Surgeon: Lloyd Gutierrez MD;  Location: [facility]    EXCISE LESION UPPER EXTREMITY Right 10/16/2023    Procedure: RIGHT ARM WIDE LOCAL EXCISION;  Surgeon: Roney Castrejon MD;  [place] [facility] [place]    EXCISE MASS UPPER EXTREMITY Left 10/16/2023    Procedure: LEFT ARM WIDE LOCAL EXCISION;  Surgeon: Roney Castrejon MD;  Location: [facility] OR    HYSTERECTOMY, VAGINAL  4/18/06    Laparoscopic supracerv hyst-BSO     Current Outpatient Medications   Medication Sig Dispense Refill    ASPIRIN PO Take 81 mg by mouth daily      Cholecalciferol (VITAMIN D3) 50 MCG (2000 UT) CAPS       fish oil-omega-3 fatty acids 1000 MG capsule Take 1 capsule (1 g) by mouth 3 times daily 120 capsule 11    lisinopril (ZESTRIL) 40 MG tablet Take 1 tablet (40 mg) by mouth daily 90 tablet 3    metoprolol succinate ER (TOPROL XL) 200 MG 24 hr tablet Take 1 tablet (200 mg) by mouth daily 90 tablet 3    Multiple Vitamins-Minerals (MULTIVITAL PO) Take  by mouth.      pravastatin (PRAVACHOL) 20 MG tablet Take 1 tablet (20 mg) by mouth daily 90 tablet 3    venlafaxine (EFFEXOR XR) 150  "MG 24 hr capsule TAKE 1 CAPSULE EVERY DAY 90 capsule 3    ketorolac (ACULAR) 0.5 % ophthalmic solution  (Patient not taking: Reported on 4/11/2024)      moxifloxacin (VIGAMOX) 0.5 % ophthalmic solution  (Patient not taking: Reported on 4/11/2024)      prednisoLONE acetate (PRED FORTE) 1 % ophthalmic suspension  (Patient not taking: Reported on 4/11/2024)       Allergies   Allergen Reactions    Atorvastatin Other (See Comments)     Myalgia      Maxzide [Hydrochlorothiazide W-Triamterene]      gout      Social History     Tobacco Use    Smoking status: Never    Smokeless tobacco: Never   Substance Use Topics    Alcohol use: Yes     Comment: rare     Family History   Problem Relation Age of Onset    Hypertension Mother     Cancer Mother         pancreatic CA    Lipids Mother     Cancer Father         liver CA    Depression Father     Depression Sister     Arthritis Sister         left knee replacement    Breast Cancer Sister     Breast Cancer Sister      History   Drug Use No       Review of Systems    Review of Systems  CONSTITUTIONAL: NEGATIVE for fever, chills, change in weight  INTEGUMENTARY/SKIN: NEGATIVE for worrisome rashes, moles or lesions  EYES: NEGATIVE for vision changes or irritation  ENT/MOUTH: NEGATIVE for ear, mouth and throat problems  RESP: NEGATIVE for significant cough or SOB  CV: NEGATIVE for chest pain, palpitations or peripheral edema  GI: NEGATIVE for nausea, abdominal pain, heartburn, or change in bowel habits  : NEGATIVE for frequency, dysuria, or hematuria  MUSCULOSKELETAL: NEGATIVE for significant arthralgias or myalgia  NEURO: NEGATIVE for weakness, dizziness or paresthesias  ENDOCRINE: NEGATIVE for temperature intolerance, skin/hair changes  HEME: NEGATIVE for bleeding problems  PSYCHIATRIC: NEGATIVE for changes in mood or affect    Objective    BP (!) 148/84   Pulse 78   Temp 98  F (36.7  C) (Tympanic)   Resp 13   Ht 1.607 m (5' 3.25\")   Wt 89.4 kg (197 lb)   LMP 10/24/2005   " "SpO2 97%   BMI 34.62 kg/m     Estimated body mass index is 34.62 kg/m  as calculated from the following:    Height as of this encounter: 1.607 m (5' 3.25\").    Weight as of this encounter: 89.4 kg (197 lb).    Physical Exam  GENERAL: alert and no distress  EYES: Eyes grossly normal to inspection, PERRL and conjunctivae and sclerae normal  HENT: ear canals and TM's normal, nose and mouth without ulcers or lesions  NECK: no adenopathy, no asymmetry, masses, or scars  RESP: lungs clear to auscultation - no rales, rhonchi or wheezes  CV: regular rate and rhythm, normal S1 S2, no S3 or S4, no murmur, click or rub, no peripheral edema  ABDOMEN: soft, nontender, no hepatosplenomegaly, no masses and bowel sounds normal  MS: no gross musculoskeletal defects noted, no edema  SKIN: no suspicious lesions or rashes  NEURO: Normal strength and tone, mentation intact and speech normal  PSYCH: mentation appears normal, affect normal/bright    Recent Labs   Lab Test 12/11/23  1458 10/19/22  1000   HGB 14.5 13.9    271    138   POTASSIUM 4.7 3.8   CR 0.93 0.87      Diagnostics  Labs pending at this time.  Results will be reviewed when available.   EKG: appears normal, NSR, normal axis, normal intervals, no acute ST/T changes c/w ischemia, no LVH by voltage criteria    Revised Cardiac Risk Index (RCRI)  The patient has the following serious cardiovascular risks for perioperative complications:   - No serious cardiac risks = 0 points   RCRI Interpretation: 0 points: Class I (very low risk - 0.4% complication rate)     Signed Electronically by:              Eduardo Barger MD, FAAFP     [facility]  [address] [place] [id number]  [email or web address]   Office: [phone]  Fax: [phone]       Copy of this evaluation report is provided to requesting physician.         "

## 2024-05-07 ENCOUNTER — OFFICE VISIT (OUTPATIENT)
Dept: FAMILY MEDICINE | Facility: CLINIC | Age: 74
End: 2024-05-07
Payer: COMMERCIAL

## 2024-05-07 DIAGNOSIS — D22.9 MULTIPLE BENIGN NEVI: ICD-10-CM

## 2024-05-07 DIAGNOSIS — L82.1 SEBORRHEIC KERATOSES: ICD-10-CM

## 2024-05-07 DIAGNOSIS — Z12.83 SKIN CANCER SCREENING: ICD-10-CM

## 2024-05-07 DIAGNOSIS — D18.01 CHERRY ANGIOMA: ICD-10-CM

## 2024-05-07 DIAGNOSIS — Z85.820 HX OF MALIGNANT MELANOMA: Primary | ICD-10-CM

## 2024-05-07 DIAGNOSIS — L81.4 SOLAR LENTIGO: ICD-10-CM

## 2024-05-07 PROCEDURE — 99213 OFFICE O/P EST LOW 20 MIN: CPT | Performed by: PHYSICIAN ASSISTANT

## 2024-05-07 ASSESSMENT — PAIN SCALES - GENERAL: PAINLEVEL: NO PAIN (0)

## 2024-05-07 NOTE — PATIENT INSTRUCTIONS
Patient Education       Proper skin care from [facility]:    -Eliminate harsh soaps as they strip the natural oils from the skin, often resulting in dry itchy skin ( i.e. Dial, Zest, [place])  -Use mild soaps such as Cetaphil or Dove Sensitive Skin in the shower. You do not need to use soap on arms, legs, and trunk every time you shower unless visibly soiled.   -Avoid hot or cold showers.  -After showering, lightly dry off and apply moisturizing within 2-3 minutes. This will help trap moisture in the skin.   -Aggressive use of a moisturizer at least 1-2 times a day to the entire body (including -Vanicream, Cetaphil, Aquaphor or Cerave) and moisturize hands after every washing.  -We recommend using moisturizers that come in a tub that needs to be scooped out, not a pump. This has more of an oil base. It will hold moisture in your skin much better than a water base moisturizer. The above recommended are non-pore clogging.      Wear a sunscreen with at least SPF 30 on your face, ears, neck and V of the chest daily. Wear sunscreen on other areas of the body if those areas are exposed to the sun throughout the day. Sunscreens can contain physical and/or chemical blockers. Physical blockers are less likely to clog pores, these include zinc oxide and titanium dioxide. Reapply every two hour and after swimming.     Sunscreen examples: https://www.ewg.org/sunscreen/    UV radiation  UVA radiation remains constant throughout the day and throughout the year. It is a longer wavelength than UVB and therefore penetrates deeper into the skin leading to immediate and delayed tanning, photoaging, and skin cancer. 70-80% of UVA and UVB radiation occurs between the hours of 10am-2pm.  UVB radiation  UVB radiation causes the most harmful effects and is more significant during the summer months. However, snow and ice can reflect UVB radiation leading to skin damage during the winter months as well. UVB radiation is  responsible for tanning, burning, inflammation, delayed erythema (pinkness), pigmentation (brown spots), and skin cancer.     I recommend self monthly full body exams and yearly full body exams with a dermatology provider. If you develop a new or changing lesion please follow up for examination. Most skin cancers are pink and scaly or pink and pearly. However, we do see blue/brown/black skin cancers.  Consider the ABCDEs of melanoma when giving yourself your monthly full body exam ( don't forget the groin, buttocks, feet, toes, etc). A-asymmetry, B-borders, C-color, D-diameter, E-elevation or evolving. If you see any of these changes please follow up in clinic. If you cannot see your back I recommend purchasing a hand held mirror to use with a larger wall mirror.       Checking for Skin Cancer  You can find cancer early by checking your skin each month. There are 3 kinds of skin cancer. They are melanoma, basal cell carcinoma, and squamous cell carcinoma. Doing monthly skin checks is the best way to find new marks or skin changes. Follow the instructions below for checking your skin.   The ABCDEs of checking moles for melanoma   Check your moles or growths for signs of melanoma using ABCDE:   Asymmetry: the sides of the mole or growth don t match  Border: the edges are ragged, notched, or blurred  Color: the color within the mole or growth varies  Diameter: the mole or growth is larger than 6 mm (size of a pencil eraser)  Evolving: the size, shape, or color of the mole or growth is changing (evolving is not shown in the images below)    Checking for other types of skin cancer  Basal cell carcinoma or squamous cell carcinoma have symptoms such as:     A spot or mole that looks different from all other marks on your skin  Changes in how an area feels, such as itching, tenderness, or pain  Changes in the skin's surface, such as oozing, bleeding, or scaliness  A sore that does not heal  New swelling or redness beyond  the border of a mole    Who s at risk?  Anyone can get skin cancer. But you are at greater risk if you have:   Fair skin, light-colored hair, or light-colored eyes  Many moles or abnormal moles on your skin  A history of sunburns from sunlight or tanning beds  A family history of skin cancer  A history of exposure to radiation or chemicals  A weakened immune system  If you have had skin cancer in the past, you are at risk for recurring skin cancer.   How to check your skin  Do your monthly skin checkups in front of a full-length mirror. Check all parts of your body, including your:   Head (ears, face, neck, and scalp)  Torso (front, back, and sides)  Arms (tops, undersides, upper, and lower armpits)  Hands (palms, backs, and fingers, including under the nails)  Buttocks and genitals  Legs (front, back, and sides)  Feet (tops, soles, toes, including under the nails, and between toes)  If you have a lot of moles, take digital photos of them each month. Make sure to take photos both up close and from a distance. These can help you see if any moles change over time.   Most skin changes are not cancer. But if you see any changes in your skin, call your doctor right away. Only he or she can diagnose a problem. If you have skin cancer, seeing your doctor can be the first step toward getting the treatment that could save your life.   [employer] last reviewed this educational content on 4/1/2019 2000-2020 [employer]. [address] [place] [address], [place] [address]. All rights reserved. This information is not intended as a substitute for professional medical care. Always follow your healthcare professional's instructions.       When should I call my doctor?  If you are worsening or not improving, please, contact us or seek urgent care as noted below.     Who should I call with questions (adults)?  [facility] (adult and pediatric): [phone]  [facility]  [place] (adult): [phone]  [facility] ([place], [place], [place] and [place]) [phone]  For urgent needs outside of business hours call the [facility] at [phone] and ask for the dermatology resident on call to be paged  If this is a medical emergency and you are unable to reach an ER, Call 911      If you need a prescription refill, please contact your pharmacy. Refills are approved or denied by our Physicians during normal business hours, Monday through Fridays  Per office policy, refills will not be granted if you have not been seen within the past year (or sooner depending on your child's condition)

## 2024-05-07 NOTE — LETTER
5/7/2024         RE: Shanelle Sepulveda  [address] [facility] [address]        Dear Colleague,    Thank you for referring your patient, Shanelle [facility]ulved[facility]. Please see a copy of my visit note below.    [facility] Dermatology Note  Encounter Date: May 7, 2024  Office Visit     Dermatology Problem List:  1. 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 ISK right flank s/p cryotherapy      ____________________________________________    Assessment & Plan:     # Hx MM, MM left upper arm (T1a) and right upper arm (T2b), Left distal upper arm insitu.  - No signs of recurrence  - Continue regular skin examinations, every 3 months through October 2025  - Sun precaution was advised including the use of sun screens of SPF 30 or higher, sun protective clothing, and avoidance of tanning beds.    # History of dysplastic nevus,with moderate to severe atypia  Left lower back, status post excision 3/20/2024  -No signs of recurrence.    # Skin cancer screening with multiple benign nevi, solar lentigines  - ABCDEs: Counseled ABCDEs of melanoma: Asymmetry, Border (irregularity), Color (not uniform, changes in color), Diameter (greater than 6 mm which is about the size of a pencil eraser), and Evolving (any changes in preexisting moles).  - Sun protection: Counseled SPF30+ sunscreen, UPF clothing, sun avoidance, tanning bed avoidance.    # Cherry angiomas and seborrheic keratoses,  Benign, reassurance given.        Procedures Performed:   N/A    Follow-up: 3 month (s) in-person, or earlier for new or changing lesions    Staff and Scribe:   Scribe Disclosure:   By signing my name below, I, Natasha Bruno, attest that this  documentation has been prepared under the direction and in the presence of Aline Teague PA-C.  - Electronically Signed: Natasha Bruno 05/07/24       Provider Disclosure:  I agree with above History, Review of Systems, Physical exam and Plan.  I have reviewed the content of the documentation and have edited it as needed. I have personally performed the services documented here and the documentation accurately represents those services and the decisions I have made.      Electronically signed by:  All risks, benefits and alternatives were discussed with patient.  Patient is in agreement and understands the assessment and plan.  All questions were answered.  Sun Screen Education was given.   Return to Clinic in 3 months or sooner as needed.   Aline Teague PA-C      ____________________________________________    CC: Skin Check (3 month [facility] [place])    HPI:  Ms. Shanelle Sepulveda is a(n) 73 year old female who presents today as a return patient for a skin check.  Last seen by me 2/6/2024 when she had 2 shave biopsies, on her left lower back and left distal upper arm.  This returned as a melanoma in situ arising within a precursor nevus and a compound dysplastic nevus with moderate to severe atypia.  Last seen in dermatology by Dr. Christian on 03/20/2024 for excisions of the sites. She typically stays out of the sun.  She had 1 bad blistering burn in her teen years and burns easily    Patient has had no health symptoms that would be considered abnormal to her.  She has some areas that she believes are age spots but nothing is painful, bleeding, not healing, growing rapidly or dark black.    -Denies unexplained weight loss, fevers, arthralgias, myalgias, swollen glands or night sweats.      Patient is otherwise feeling well, without additional skin concerns.     Labs Reviewed:    A. Left distal upper arm:  - Compound dysplastic nevus with moderate to severe atypia, extending to the lateral and deep margins - (see  comment and description)        B. Left lower back:  - Melanoma in situ arising within a precursor nevus   Dermatopathology from 03/20/2024  Final Diagnosis   A(1). Skin, L Lower Back, excision:  - Scar from the prior surgical procedure, with no evidence of residual lesion - (see description)      B(2). Skin, L Distal Upper Arm, excision:  - Scar from the prior surgical procedure, with no evidence of residual lesion - (see description)        Physical exam:  Vitals: LMP 10/24/2005   GEN: This is a well developed, well-nourished female in no acute distress, in a pleasant mood.    SKIN: Full skin, which includes the head/face, both arms, chest, back, abdomen,both legs, genitalia and/or groin buttocks, digits and/or nails, was examined.  - There are dome shaped bright red papules on the head/neck, trunk, extremities.   - Multiple regular brown pigmented macules and papules are identified on the head/neck, trunk, extremities.   - Scattered brown macules on sun exposed areas.  - There are waxy stuck on tan to brown papules on the head/neck, trunk, extremities.  - There is no lymphadenopathy on palpation of cervical, post auricular, occipital, axillary, inguinal, and popliteal nodes.  - linear scars on L posterior upper arm x2, L lower back, R upper arm  - No other lesions of concern on areas examined.         Medications:  Current Outpatient Medications   Medication Sig Dispense Refill     ASPIRIN PO Take 81 mg by mouth daily       Cholecalciferol (VITAMIN D3) 50 MCG (2000 UT) CAPS        fish oil-omega-3 fatty acids 1000 MG capsule Take 1 capsule (1 g) by mouth 3 times daily 120 capsule 11     ketorolac (ACULAR) 0.5 % ophthalmic solution        lisinopril (ZESTRIL) 40 MG tablet Take 1 tablet (40 mg) by mouth daily 90 tablet 3     metoprolol succinate ER (TOPROL XL) 200 MG 24 hr tablet Take 1 tablet (200 mg) by mouth daily 90 tablet 3     moxifloxacin (VIGAMOX) 0.5 % ophthalmic solution        Multiple Vitamins-Minerals  (MULTIVITAL PO) Take  by mouth.       pravastatin (PRAVACHOL) 20 MG tablet Take 1 tablet (20 mg) by mouth daily 90 tablet 3     prednisoLONE acetate (PRED FORTE) 1 % ophthalmic suspension        venlafaxine (EFFEXOR XR) 150 MG 24 hr capsule TAKE 1 CAPSULE EVERY DAY 90 capsule 3     No current facility-administered medications for this visit.      Past Medical History:   Patient Active Problem List   Diagnosis     Generalized anxiety disorder     Advanced directives, counseling/discussion     Overactive bladder     Atrophic vaginitis     Hyperlipidemia with target LDL less than 130     HTN, goal below 140/90     Urgency-frequency syndrome     Family history of malignant neoplasm of breast     Cervical cancer screening     Major depression in complete remission (H24)     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, including shoulder, left (H)     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, including shoulder, right (H)     Past Medical History:   Diagnosis Date     Intramural leiomyoma of uterus      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, including shoulder, left (H) 9/21/2023     MENOPAUSAL DISORDER NOS 8/10/2005    Menses getting much heavier, pelvic sono 02/06--2 uterine fibroids seen, 1.5 and 2.7cm each; simple left ovarian cyst 4.2 cm, 2.6 cm complex right ovarian cyst--endometrial biopsy benign     Symptomatic menopausal or female climacteric states        CC No referring provider defined for this encounter. on close of this encounter.       Again, thank you for allowing me to participate in the care of your patient.        Sincerely,        Aline Teague PA-C

## 2024-05-07 NOTE — PROGRESS NOTES
[facility] Dermatology Note  Encounter Date: May 7, 2024  Office Visit     Dermatology Problem List:  1. 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 ISK right flank s/p cryotherapy      ____________________________________________    Assessment & Plan:     # Hx MM, MM left upper arm (T1a) and right upper arm (T2b), Left distal upper arm insitu.  - No signs of recurrence  - Continue regular skin examinations, every 3 months through October 2025  - Sun precaution was advised including the use of sun screens of SPF 30 or higher, sun protective clothing, and avoidance of tanning beds.    # History of dysplastic nevus,with moderate to severe atypia  Left lower back, status post excision 3/20/2024  -No signs of recurrence.    # Skin cancer screening with multiple benign nevi, solar lentigines  - ABCDEs: Counseled ABCDEs of melanoma: Asymmetry, Border (irregularity), Color (not uniform, changes in color), Diameter (greater than 6 mm which is about the size of a pencil eraser), and Evolving (any changes in preexisting moles).  - Sun protection: Counseled SPF30+ sunscreen, UPF clothing, sun avoidance, tanning bed avoidance.    # Cherry angiomas and seborrheic keratoses,  Benign, reassurance given.        Procedures Performed:   N/A    Follow-up: 3 month (s) in-person, or earlier for new or changing lesions    Staff and Scribe:   Scribe Disclosure:   By signing my name below, I, Natasha Bruno, attest that this documentation has been prepared under the direction and in the presence of Aline Teague PA-C.  - Electronically Signed: Natasha Bruno 05/07/24       Provider Disclosure:  I agree with above History, Review of Systems, Physical exam and Plan.  I have reviewed  the content of the documentation and have edited it as needed. I have personally performed the services documented here and the documentation accurately represents those services and the decisions I have made.      Electronically signed by:  All risks, benefits and alternatives were discussed with patient.  Patient is in agreement and understands the assessment and plan.  All questions were answered.  Sun Screen Education was given.   Return to Clinic in 3 months or sooner as needed.   Aline Teague PA-C      ____________________________________________    CC: Skin Check (3 month [facility] [place])    HPI:  Ms. Shanelle Sepulveda is a(n) 73 year old female who presents today as a return patient for a skin check.  Last seen by me 2/6/2024 when she had 2 shave biopsies, on her left lower back and left distal upper arm.  This returned as a melanoma in situ arising within a precursor nevus and a compound dysplastic nevus with moderate to severe atypia.  Last seen in dermatology by Dr. Christian on 03/20/2024 for excisions of the sites. She typically stays out of the sun.  She had 1 bad blistering burn in her teen years and burns easily    Patient has had no health symptoms that would be considered abnormal to her.  She has some areas that she believes are age spots but nothing is painful, bleeding, not healing, growing rapidly or dark black.    -Denies unexplained weight loss, fevers, arthralgias, myalgias, swollen glands or night sweats.      Patient is otherwise feeling well, without additional skin concerns.     Labs Reviewed:    A. Left distal upper arm:  - Compound dysplastic nevus with moderate to severe atypia, extending to the lateral and deep margins - (see comment and description)        B. Left lower back:  - Melanoma in situ arising within a precursor nevus   Dermatopathology from 03/20/2024  Final Diagnosis   A(1). Skin, L Lower Back, excision:  - Scar from the prior surgical procedure, with no evidence of residual lesion  - (see description)      B(2). Skin, L Distal Upper Arm, excision:  - Scar from the prior surgical procedure, with no evidence of residual lesion - (see description)        Physical exam:  Vitals: LMP 10/24/2005   GEN: This is a well developed, well-nourished female in no acute distress, in a pleasant mood.    SKIN: Full skin, which includes the head/face, both arms, chest, back, abdomen,both legs, genitalia and/or groin buttocks, digits and/or nails, was examined.  - There are dome shaped bright red papules on the head/neck, trunk, extremities.   - Multiple regular brown pigmented macules and papules are identified on the head/neck, trunk, extremities.   - Scattered brown macules on sun exposed areas.  - There are waxy stuck on tan to brown papules on the head/neck, trunk, extremities.  - There is no lymphadenopathy on palpation of cervical, post auricular, occipital, axillary, inguinal, and popliteal nodes.  - linear scars on L posterior upper arm x2, L lower back, R upper arm  - No other lesions of concern on areas examined.         Medications:  Current Outpatient Medications   Medication Sig Dispense Refill    ASPIRIN PO Take 81 mg by mouth daily      Cholecalciferol (VITAMIN D3) 50 MCG (2000 UT) CAPS       fish oil-omega-3 fatty acids 1000 MG capsule Take 1 capsule (1 g) by mouth 3 times daily 120 capsule 11    ketorolac (ACULAR) 0.5 % ophthalmic solution       lisinopril (ZESTRIL) 40 MG tablet Take 1 tablet (40 mg) by mouth daily 90 tablet 3    metoprolol succinate ER (TOPROL XL) 200 MG 24 hr tablet Take 1 tablet (200 mg) by mouth daily 90 tablet 3    moxifloxacin (VIGAMOX) 0.5 % ophthalmic solution       Multiple Vitamins-Minerals (MULTIVITAL PO) Take  by mouth.      pravastatin (PRAVACHOL) 20 MG tablet Take 1 tablet (20 mg) by mouth daily 90 tablet 3    prednisoLONE acetate (PRED FORTE) 1 % ophthalmic suspension       venlafaxine (EFFEXOR XR) 150 MG 24 hr capsule TAKE 1 CAPSULE EVERY DAY 90 capsule 3     No  current facility-administered medications for this visit.      Past Medical History:   Patient Active Problem List   Diagnosis    Generalized anxiety disorder    Advanced directives, counseling/discussion    Overactive bladder    Atrophic vaginitis    Hyperlipidemia with target LDL less than 130    HTN, goal below 140/90    Urgency-frequency syndrome    Family history of malignant neoplasm of breast    Cervical cancer screening    Major depression in complete remission (H24)    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, including shoulder, left (H)    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, including shoulder, right (H)     Past Medical History:   Diagnosis Date    Intramural leiomyoma of uterus     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, including shoulder, left (H) 9/21/2023    MENOPAUSAL DISORDER NOS 8/10/2005    Menses getting much heavier, pelvic sono 02/06--2 uterine fibroids seen, 1.5 and 2.7cm each; simple left ovarian cyst 4.2 cm, 2.6 cm complex right ovarian cyst--endometrial biopsy benign    Symptomatic menopausal or female climacteric states        CC No referring provider defined for this encounter. on close of this encounter.

## 2024-06-05 ENCOUNTER — OFFICE VISIT (OUTPATIENT)
Dept: INTERNAL MEDICINE | Facility: CLINIC | Age: 74
End: 2024-06-05
Payer: COMMERCIAL

## 2024-06-05 VITALS
BODY MASS INDEX: 35.08 KG/M2 | OXYGEN SATURATION: 97 % | HEIGHT: 63 IN | WEIGHT: 198 LBS | RESPIRATION RATE: 18 BRPM | TEMPERATURE: 97.8 F | DIASTOLIC BLOOD PRESSURE: 85 MMHG | HEART RATE: 72 BPM | SYSTOLIC BLOOD PRESSURE: 137 MMHG

## 2024-06-05 DIAGNOSIS — I10 HTN, GOAL BELOW 140/90: Primary | ICD-10-CM

## 2024-06-05 DIAGNOSIS — E66.812 CLASS 2 SEVERE OBESITY DUE TO EXCESS CALORIES WITH SERIOUS COMORBIDITY AND BODY MASS INDEX (BMI) OF 35.0 TO 35.9 IN ADULT (H): ICD-10-CM

## 2024-06-05 DIAGNOSIS — E66.01 CLASS 2 SEVERE OBESITY DUE TO EXCESS CALORIES WITH SERIOUS COMORBIDITY AND BODY MASS INDEX (BMI) OF 35.0 TO 35.9 IN ADULT (H): ICD-10-CM

## 2024-06-05 PROCEDURE — 99214 OFFICE O/P EST MOD 30 MIN: CPT | Performed by: INTERNAL MEDICINE

## 2024-06-05 NOTE — PATIENT INSTRUCTIONS
Blood pressure at goal.  Will continue lisinopril 40 mg daily and metoprolol succinate  mg daily as currently prescribed.

## 2024-06-05 NOTE — PROGRESS NOTES
"  Assessment & Plan     HTN, goal below 140/90  At this time, patient blood pressure does appear to be under good control.  We will continue her lisinopril 40 mg daily and metoprolol succinate at 200 mg daily.  Side effects of each medication were reviewed.  Patient was encouraged to continue monitoring her blood pressure outside the clinic setting.    Class 2 severe obesity due to excess calories with serious comorbidity and body mass index (BMI) of 35.0 to 35.9 in adult (H)  BMI is 35.07 with history of hypertension.  Weight loss encouraged.      BMI  Estimated body mass index is 35.07 kg/m  as calculated from the following:    Height as of this encounter: 1.6 m (5' 3\").    Weight as of this encounter: 89.8 kg (198 lb).   Weight management plan: Discussed healthy diet and exercise guidelines      See Patient Instructions    Subjective   Mckenzie is a 73 year old, presenting for the following health issues:  Recheck Medication        6/5/2024     2:36 PM   Additional Questions   Roomed by PATRIZIA Matias     Patient is a 73-year-old  female who presents to the clinic for a follow-up visit in regards to her blood pressure.  Patient has been taking lisinopril 40 mg daily and metoprolol succinate 200 mg daily for management of her blood pressure.  She is tolerating both medications without issue.  Patient does check her blood pressure intermittently at home, and her systolic pressures are typically less than 140 and diastolic is in the 70s and 80s.  She did not wish to follow-up today to ensure that she should continue her medications as currently prescribed.  She had no other questions or concerns to address today.    History of Present Illness       Hypertension: She presents for follow up of hypertension.  She does not check blood pressure  regularly outside of the clinic. Outpatient blood pressures have not been over 140/90. She follows a low salt diet.     She eats 2-3 servings of fruits and vegetables " "daily.She consumes 0 sweetened beverage(s) daily.She exercises with enough effort to increase her heart rate 10 to 19 minutes per day.  She exercises with enough effort to increase her heart rate 3 or less days per week.   She is taking medications regularly.         Review of Systems  CONSTITUTIONAL: NEGATIVE for fever, chills, change in weight  ENT/MOUTH: NEGATIVE for ear, mouth and throat problems  RESP: NEGATIVE for significant cough or SOB  CV: NEGATIVE for chest pain, palpitations or peripheral edema  GI: NEGATIVE for nausea, abdominal pain, heartburn, or change in bowel habits  MUSCULOSKELETAL: NEGATIVE for significant arthralgias or myalgia  NEURO: NEGATIVE for weakness, dizziness or paresthesias      Objective    /85   Pulse 72   Temp 97.8  F (36.6  C)   Resp 18   Ht 1.6 m (5' 3\")   Wt 89.8 kg (198 lb)   LMP 10/24/2005   SpO2 97%   Breastfeeding No   BMI 35.07 kg/m    Body mass index is 35.07 kg/m .  Physical Exam  Vitals reviewed.   Constitutional:       Appearance: Normal appearance.   HENT:      Head: Normocephalic and atraumatic.      Mouth/Throat:      Mouth: Mucous membranes are moist.      Pharynx: Oropharynx is clear.   Eyes:      Extraocular Movements: Extraocular movements intact.      Conjunctiva/sclera: Conjunctivae normal.      Pupils: Pupils are equal, round, and reactive to light.   Cardiovascular:      Rate and Rhythm: Normal rate and regular rhythm.      Pulses: Normal pulses.      Heart sounds: Normal heart sounds.   Pulmonary:      Effort: Pulmonary effort is normal.      Breath sounds: Normal breath sounds.   Skin:     General: Skin is dry.      Capillary Refill: Capillary refill takes less than 2 seconds.   Neurological:      Mental Status: She is alert.           Signed Electronically by: Tio Rainey MD    "

## 2024-08-06 ENCOUNTER — OFFICE VISIT (OUTPATIENT)
Dept: DERMATOLOGY | Facility: CLINIC | Age: 74
End: 2024-08-06
Attending: PHYSICIAN ASSISTANT
Payer: COMMERCIAL

## 2024-08-06 DIAGNOSIS — D22.9 MULTIPLE BENIGN NEVI: ICD-10-CM

## 2024-08-06 DIAGNOSIS — D18.01 CHERRY ANGIOMA: ICD-10-CM

## 2024-08-06 DIAGNOSIS — D49.2 NEOPLASM OF UNSPECIFIED BEHAVIOR OF BONE, SOFT TISSUE, AND SKIN: ICD-10-CM

## 2024-08-06 DIAGNOSIS — Z85.820 HX OF MALIGNANT MELANOMA: ICD-10-CM

## 2024-08-06 DIAGNOSIS — L82.1 SEBORRHEIC KERATOSES: ICD-10-CM

## 2024-08-06 DIAGNOSIS — L81.4 SOLAR LENTIGO: ICD-10-CM

## 2024-08-06 DIAGNOSIS — Z12.83 SKIN CANCER SCREENING: Primary | ICD-10-CM

## 2024-08-06 DIAGNOSIS — L30.9 ECZEMA, UNSPECIFIED TYPE: ICD-10-CM

## 2024-08-06 DIAGNOSIS — Z86.018 HISTORY OF DYSPLASTIC NEVUS: ICD-10-CM

## 2024-08-06 PROCEDURE — 11102 TANGNTL BX SKIN SINGLE LES: CPT | Performed by: PHYSICIAN ASSISTANT

## 2024-08-06 PROCEDURE — 88341 IMHCHEM/IMCYTCHM EA ADD ANTB: CPT | Performed by: PATHOLOGY

## 2024-08-06 PROCEDURE — 88305 TISSUE EXAM BY PATHOLOGIST: CPT | Performed by: PATHOLOGY

## 2024-08-06 PROCEDURE — 88342 IMHCHEM/IMCYTCHM 1ST ANTB: CPT | Performed by: PATHOLOGY

## 2024-08-06 PROCEDURE — 99213 OFFICE O/P EST LOW 20 MIN: CPT | Mod: 25 | Performed by: PHYSICIAN ASSISTANT

## 2024-08-06 ASSESSMENT — PAIN SCALES - GENERAL: PAINLEVEL: NO PAIN (0)

## 2024-08-06 NOTE — LETTER
8/6/2024      Shanelle Sepulveda  [address] [facility] [address]      Dear Colleague,    Thank you for referring your patient, Shanelle Sepulveda, to the [facility] [place]. Please see a copy of my visit note below.    [facility] Dermatology Note  Encounter Date: Aug 6, 2024  Office Visit     Dermatology Problem List:  1. 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 ISK right flank s/p cryotherapy  3. NUB x1, R distal forearm, s/p shave bx 08/06/2024    ____________________________________________    Assessment & Plan:     # Hx MM, MM left upper arm (T1a) and right upper arm (T2b), Left distal upper arm insitu.  - No signs of recurrence  - Continue regular skin examinations, every 3 months through October 2025  - Sun precaution was advised including the use of sun screens of SPF 30 or higher, sun protective clothing, and avoidance of tanning beds.    # History of dysplastic nevus,with moderate to severe atypia  Left lower back, status post excision 3/20/2024  -No signs of recurrence.    # Skin cancer screening with multiple benign nevi, solar lentigines  - ABCDEs: Counseled ABCDEs of melanoma: Asymmetry, Border (irregularity), Color (not uniform, changes in color), Diameter (greater than 6 mm which is about the size of a pencil eraser), and Evolving (any changes in preexisting moles).  - Sun protection: Counseled SPF30+ sunscreen, UPF clothing, sun avoidance, tanning bed avoidance.    # Cherry angiomas and seborrheic keratoses,  Benign, reassurance given.      # Neoplasm of unspecified behavior of the skin (D49.2) on the R distal forearm. The differential diagnosis includes inflamed nevus vs dysplastic nevus vs other.   - shave biopsy  performed today, see procedure note below    # Eczematous Dermatitis, secondary to xerosis  - recommending moisturizer  - patient can use Hydrocortisone to treat itching    # Seborrheic Keratosis on dorsal hands  - recommending OTC Differin gel    Procedures Performed:   - Shave biopsy: After discussion of benefits and risks including but not limited to bleeding, infection, scar, incomplete removal, recurrence, and non-diagnostic biopsy, written consent and photographs were obtained. The area was cleaned with isopropyl alcohol. < 1mL of 1% lidocaine with epinephrine was injected to obtain adequate anesthesia. A shave biopsy was performed. Hemostasis was achieved with aluminium chloride. Vaseline and a sterile dressing were applied. The patient tolerated the procedure and no complications were noted. The patient was provided with verbal and written post care instructions.       Follow-up: 3 month (s) in-person, or earlier for new or changing lesions    Staff and Scribe:   Scribe Disclosure:   By signing my name below, I, Natasha Bruno, attest that this documentation has been prepared under the direction and in the presence of Aline Teague PA-C.  - Electronically Signed: Natasha Bruno 08/06/24       Provider Disclosure:  I agree with above History, Review of Systems, Physical exam and Plan.  I have reviewed the content of the documentation and have edited it as needed. I have personally performed the services documented here and the documentation accurately represents those services and the decisions I have made.      Electronically signed by:  All risks, benefits and alternatives were discussed with patient.  Patient is in agreement and understands the assessment and plan.  All questions were answered.  Sun Screen Education was given.   Return to Clinic in 3 months or sooner as needed.   Aline Teague PA-C        ____________________________________________    CC: Skin Check (FBSC, c/o some dry spots on R  upper arm)    HPI:  Ms. Shanelle Sepulveda is a(n) 73 year old female who presents today as a return patient for a skin check.      Patient reports new presence of scabs on one side of her arm that are concerning.    Patient is otherwise feeling well, without additional skin concerns.     Labs Reviewed:  N/A    Physical exam:  Vitals: LMP 10/24/2005   GEN: This is a well developed, well-nourished female in no acute distress, in a pleasant mood.    SKIN: Full skin, which includes the head/face, both arms, chest, back, abdomen,both legs, genitalia and/or groin buttocks, digits and/or nails, was examined.  - xerosis on UE  - faint pink scaly papules on R upper arm  - R distal forearm pink and brown papule 4 mm   Linear scars on arms from previous skin cancers and left lower back  - There is no lymphadenopathy on palpation of cervical, post auricular, occipital, axillary, inguinal, and popliteal nodes.  - There are dome shaped bright red papules on the head/neck, trunk, extremities.   - Multiple regular brown pigmented macules and papules are identified on the head/neck, trunk, extremities.   - Scattered brown macules on sun exposed areas.  - There are waxy stuck on tan to brown papules on the head/neck, trunk, extremities.  - No other lesions of concern on areas examined.           Medications:  Current Outpatient Medications   Medication Sig Dispense Refill     ASPIRIN PO Take 81 mg by mouth daily       Cholecalciferol (VITAMIN D3) 50 MCG (2000 UT) CAPS        fish oil-omega-3 fatty acids 1000 MG capsule Take 1 capsule (1 g) by mouth 3 times daily 120 capsule 11     ketorolac (ACULAR) 0.5 % ophthalmic solution        lisinopril (ZESTRIL) 40 MG tablet Take 1 tablet (40 mg) by mouth daily 90 tablet 3     metoprolol succinate ER (TOPROL XL) 200 MG 24 hr tablet Take 1 tablet (200 mg) by mouth daily 90 tablet 3     moxifloxacin (VIGAMOX) 0.5 % ophthalmic solution        Multiple Vitamins-Minerals (MULTIVITAL PO) Take  by  mouth.       pravastatin (PRAVACHOL) 20 MG tablet Take 1 tablet (20 mg) by mouth daily 90 tablet 3     prednisoLONE acetate (PRED FORTE) 1 % ophthalmic suspension        venlafaxine (EFFEXOR XR) 150 MG 24 hr capsule TAKE 1 CAPSULE EVERY DAY 90 capsule 3     No current facility-administered medications for this visit.      Past Medical History:   Patient Active Problem List   Diagnosis     Generalized anxiety disorder     Overactive bladder     Atrophic vaginitis     Hyperlipidemia with target LDL less than 130     HTN, goal below 140/90     Urgency-frequency syndrome     Family history of malignant neoplasm of breast     Cervical cancer screening     Major depression in complete remission (H24)     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, including shoulder, left (H)     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, including shoulder, right (H)     Class 2 severe obesity due to excess calories with serious comorbidity in adult (H)     Past Medical History:   Diagnosis Date     Intramural leiomyoma of uterus      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, including shoulder, left (H) 9/21/2023     MENOPAUSAL DISORDER NOS 8/10/2005    Menses getting much heavier, pelvic sono 02/06--2 uterine fibroids seen, 1.5 and 2.7cm each; simple left ovarian cyst 4.2 cm, 2.6 cm complex right ovarian cyst--endometrial biopsy benign     Symptomatic menopausal or female climacteric states        CC No referring provider defined for this encounter. on close of this encounter.       Again, thank you for allowing me to participate in the care of your patient.        Sincerely,        Aline Teague PA-C

## 2024-08-06 NOTE — PATIENT INSTRUCTIONS
Wound Care After a Biopsy    What is a skin biopsy?  A skin biopsy allows the doctor to examine a very small piece of tissue under the microscope to determine the diagnosis and the best treatment for the skin condition. A local anesthetic (numbing medicine) is injected with a very small needle into the skin area to be tested. A small piece of skin is taken from the area. Sometimes a suture (stitch) is used.     What are the risks of a skin biopsy?  I will experience scar, bleeding, swelling, pain, crusting and redness. I may experience incomplete removal or recurrence. Risks of this procedure are excessive bleeding, bruising, infection, nerve damage, numbness, thick (hypertrophic or keloidal) scar and non-diagnostic biopsy.    How should I care for my wound for the first 24 hours?  Keep the wound dry and covered for 24 hours  If it bleeds, hold direct pressure on the area for 15 minutes. If bleeding does not stop, call us or go to the emergency room  Avoid strenuous exercise the first 1-2 days or as your doctor instructs you    How should I care for the wound after 24 hours?  After 24 hours, remove the bandage  You may bathe or shower as normal  If you had a scalp biopsy, you can shampoo as usual and can use shower water to clean the biopsy site daily  Clean the wound once a day with gentle soap and water  Do not scrub, be gentle  Apply white petroleum/Vaseline after cleaning the wound with a cotton swab or a clean finger, and keep the site covered with a Bandaid /bandage. Bandages are not necessary with a scalp biopsy  If you are unable to cover the site with a Bandaid /bandage, re-apply ointment 2-3 times a day to keep the site moist. Moisture will help with healing  Avoid strenuous activity for first 1-2 days  Avoid lakes, rivers, pools, and oceans until the stitches are removed or the site is healed    How do I clean my wound?  Wash hands thoroughly with soap or use hand  before all wound care  Clean  the wound with gentle soap and water  Apply white petroleum/Vaseline  to wound after it is clean  Replace the Bandaid /bandage to keep the wound covered for the first few days or as instructed by your doctor  If you had a scalp biopsy, warm shower water to the area on a daily basis should suffice    What should I use to clean my wound?   Cotton-tipped applicators (Qtips )  White petroleum jelly (Vaseline ). Use a clean new container and use Q-tips to apply.  Bandaids  as needed  Gentle soap     How should I care for my wound long term?  Do not get your wound dirty  Keep up with wound care for one week or until the area is healed.   You may return to our clinic for this or you may have it done locally at your doctor s office.  A small scab will form and fall off by itself when the area is completely healed. The area will be red and will become pink in color as it heals. Sun protection is very important for how your scar will turn out. Sunscreen with an SPF 30 or greater is recommended once the area is healed.  You should have some soreness but it should be mild and slowly go away over several days. Talk to your doctor about using tylenol for pain,    When should I call my doctor?  If you have increased:   Pain or swelling  Pus or drainage (clear or slightly yellow drainage is ok)  Temperature over 100F  Spreading redness or warmth around wound    When will I hear about my results?  The biopsy results can take 2 weeks to come back.  Your results will automatically release to [employer] before your provider has even reviewed them.  The clinic will call you with the results, send you a [employer] message, or have you schedule a follow-up clinic or phone time to discuss the results.  Contact our clinics if you do not hear from us in 2 weeks.    Who should I call with questions?  [facility]: [phone]  [facility]: [phone]  For urgent needs outside of  business hours call the [facility] at [phone] and ask for the dermatology resident on call For the darks spots on your hands, you can start Differin gel 0.1% over the counter. Moisturize on top.     Wound Care After a Biopsy    What is a skin biopsy?  A skin biopsy allows the doctor to examine a very small piece of tissue under the microscope to determine the diagnosis and the best treatment for the skin condition. A local anesthetic (numbing medicine) is injected with a very small needle into the skin area to be tested. A small piece of skin is taken from the area. Sometimes a suture (stitch) is used.     What are the risks of a skin biopsy?  I will experience scar, bleeding, swelling, pain, crusting and redness. I may experience incomplete removal or recurrence. Risks of this procedure are excessive bleeding, bruising, infection, nerve damage, numbness, thick (hypertrophic or keloidal) scar and non-diagnostic biopsy.    How should I care for my wound for the first 24 hours?  Keep the wound dry and covered for 24 hours  If it bleeds, hold direct pressure on the area for 15 minutes. If bleeding does not stop, call us or go to the emergency room  Avoid strenuous exercise the first 1-2 days or as your doctor instructs you    How should I care for the wound after 24 hours?  After 24 hours, remove the bandage  You may bathe or shower as normal  If you had a scalp biopsy, you can shampoo as usual and can use shower water to clean the biopsy site daily  Clean the wound once a day with gentle soap and water  Do not scrub, be gentle  Apply white petroleum/Vaseline after cleaning the wound with a cotton swab or a clean finger, and keep the site covered with a Bandaid /bandage. Bandages are not necessary with a scalp biopsy  If you are unable to cover the site with a Bandaid /bandage, re-apply ointment 2-3 times a day to keep the site moist. Moisture will help with healing  Avoid strenuous activity for first 1-2  days  Avoid lakes, rivers, pools, and oceans until the stitches are removed or the site is healed    How do I clean my wound?  Wash hands thoroughly with soap or use hand  before all wound care  Clean the wound with gentle soap and water  Apply white petroleum/Vaseline  to wound after it is clean  Replace the Bandaid /bandage to keep the wound covered for the first few days or as instructed by your doctor  If you had a scalp biopsy, warm shower water to the area on a daily basis should suffice    What should I use to clean my wound?   Cotton-tipped applicators (Qtips )  White petroleum jelly (Vaseline ). Use a clean new container and use Q-tips to apply.  Bandaids  as needed  Gentle soap     How should I care for my wound long term?  Do not get your wound dirty  Keep up with wound care for one week or until the area is healed.  A small scab will form and fall off by itself when the area is completely healed. The area will be red and will become pink in color as it heals. Sun protection is very important for how your scar will turn out. Sunscreen with an SPF 30 or greater is recommended once the area is healed.  You should have some soreness but it should be mild and slowly go away over several days. Talk to your doctor about using tylenol for pain,    When should I call my doctor?  If you have increased:   Pain or swelling  Pus or drainage (clear or slightly yellow drainage is ok)  Temperature over 100F  Spreading redness or warmth around wound    When will I hear about my results?  The biopsy results can take 2 weeks to come back.  Your results will automatically release to [facility] before your provider has even reviewed them.  The clinic will call you with the results, send you a [facility] message, or have you schedule a follow-up clinic or phone time to discuss the results.  Contact our clinics if you do not hear from us in 2 weeks.    Who should I call with questions?  [facility]  [place]: [phone]  [facility]: [phone]  For urgent needs outside of business hours call the [facility] [[email or web address]] at [phone] and ask for the dermatology resident on call     Proper skin care from [facility]:    -Eliminate harsh soaps as they strip the natural oils from the skin, often resulting in dry itchy skin ( i.e. Dial, Zest, [place] Spring)  -Use mild soaps such as Cetaphil or Dove Sensitive Skin in the shower. You do not need to use soap on arms, legs, and trunk every time you shower unless visibly soiled.   -Avoid hot or cold showers.  -After showering, lightly dry off and apply moisturizing within 2-3 minutes. This will help trap moisture in the skin.   -Aggressive use of a moisturizer at least 1-2 times a day to the entire body (including -Vanicream, Cetaphil, Aquaphor or Cerave) and moisturize hands after every washing.  -We recommend using moisturizers that come in a tub that needs to be scooped out, not a pump. This has more of an oil base. It will hold moisture in your skin much better than a water base moisturizer. The above recommended are non-pore clogging.      Wear a sunscreen with at least SPF 30 on your face, ears, neck and V of the chest daily. Wear sunscreen on other areas of the body if those areas are exposed to the sun throughout the day. Sunscreens can contain physical and/or chemical blockers. Physical blockers are less likely to clog pores, these include zinc oxide and titanium dioxide. Reapply every two hour and after swimming.     Sunscreen examples: https://www.ewg.org/sunscreen/    UV radiation  UVA radiation remains constant throughout the day and throughout the year. It is a longer wavelength than UVB and therefore penetrates deeper into the skin leading to immediate and delayed tanning, photoaging, and skin cancer. 70-80% of UVA and UVB radiation occurs between the hours of 10am-2pm.  UVB radiation  UVB radiation causes the most  harmful effects and is more significant during the summer months. However, snow and ice can reflect UVB radiation leading to skin damage during the winter months as well. UVB radiation is responsible for tanning, burning, inflammation, delayed erythema (pinkness), pigmentation (brown spots), and skin cancer.     I recommend self monthly full body exams and yearly full body exams with a dermatology provider. If you develop a new or changing lesion please follow up for examination. Most skin cancers are pink and scaly or pink and pearly. However, we do see blue/brown/black skin cancers.  Consider the ABCDEs of melanoma when giving yourself your monthly full body exam ( don't forget the groin, buttocks, feet, toes, etc). A-asymmetry, B-borders, C-color, D-diameter, E-elevation or evolving. If you see any of these changes please follow up in clinic. If you cannot see your back I recommend purchasing a hand held mirror to use with a larger wall mirror.       Checking for Skin Cancer  You can find cancer early by checking your skin each month. There are 3 kinds of skin cancer. They are melanoma, basal cell carcinoma, and squamous cell carcinoma. Doing monthly skin checks is the best way to find new marks or skin changes. Follow the instructions below for checking your skin.   The ABCDEs of checking moles for melanoma   Check your moles or growths for signs of melanoma using ABCDE:   Asymmetry: the sides of the mole or growth don t match  Border: the edges are ragged, notched, or blurred  Color: the color within the mole or growth varies  Diameter: the mole or growth is larger than 6 mm (size of a pencil eraser)  Evolving: the size, shape, or color of the mole or growth is changing (evolving is not shown in the images below)    Checking for other types of skin cancer  Basal cell carcinoma or squamous cell carcinoma have symptoms such as:     A spot or mole that looks different from all other marks on your skin  Changes in  how an area feels, such as itching, tenderness, or pain  Changes in the skin's surface, such as oozing, bleeding, or scaliness  A sore that does not heal  New swelling or redness beyond the border of a mole    Who s at risk?  Anyone can get skin cancer. But you are at greater risk if you have:   Fair skin, light-colored hair, or light-colored eyes  Many moles or abnormal moles on your skin  A history of sunburns from sunlight or tanning beds  A family history of skin cancer  A history of exposure to radiation or chemicals  A weakened immune system  If you have had skin cancer in the past, you are at risk for recurring skin cancer.   How to check your skin  Do your monthly skin checkups in front of a full-length mirror. Check all parts of your body, including your:   Head (ears, face, neck, and scalp)  Torso (front, back, and sides)  Arms (tops, undersides, upper, and lower armpits)  Hands (palms, backs, and fingers, including under the nails)  Buttocks and genitals  Legs (front, back, and sides)  Feet (tops, soles, toes, including under the nails, and between toes)  If you have a lot of moles, take digital photos of them each month. Make sure to take photos both up close and from a distance. These can help you see if any moles change over time.   Most skin changes are not cancer. But if you see any changes in your skin, call your doctor right away. Only he or she can diagnose a problem. If you have skin cancer, seeing your doctor can be the first step toward getting the treatment that could save your life.   [employer] last reviewed this educational content on 4/1/2019 2000-2020 [employer]. [address] [place] [address], [place] [address]. All rights reserved. This information is not intended as a substitute for professional medical care. Always follow your healthcare professional's instructions.       When should I call my doctor?  If you are worsening or not improving, please, contact us or seek urgent  care as noted below.     Who should I call with questions (adults)?    [facility] [phone]  For urgent needs outside of business hours call the [facility] at [phone] and ask for the dermatology resident on call to be paged  If this is a medical emergency and you are unable to reach an ER, Call 911      If you need a prescription refill, please contact your pharmacy. Refills are approved or denied by our Physicians during normal business hours, Monday through Fridays  Per office policy, refills will not be granted if you have not been seen within the past year (or sooner depending on your child's condition)

## 2024-08-06 NOTE — PROGRESS NOTES
[facility] Dermatology Note  Encounter Date: Aug 6, 2024  Office Visit     Dermatology Problem List:  1. 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 ISK right flank s/p cryotherapy  3. NUB x1, R distal forearm, s/p shave bx 08/06/2024    ____________________________________________    Assessment & Plan:     # Hx MM, MM left upper arm (T1a) and right upper arm (T2b), Left distal upper arm insitu.  - No signs of recurrence  - Continue regular skin examinations, every 3 months through October 2025  - Sun precaution was advised including the use of sun screens of SPF 30 or higher, sun protective clothing, and avoidance of tanning beds.    # History of dysplastic nevus,with moderate to severe atypia  Left lower back, status post excision 3/20/2024  -No signs of recurrence.    # Skin cancer screening with multiple benign nevi, solar lentigines  - ABCDEs: Counseled ABCDEs of melanoma: Asymmetry, Border (irregularity), Color (not uniform, changes in color), Diameter (greater than 6 mm which is about the size of a pencil eraser), and Evolving (any changes in preexisting moles).  - Sun protection: Counseled SPF30+ sunscreen, UPF clothing, sun avoidance, tanning bed avoidance.    # Cherry angiomas and seborrheic keratoses,  Benign, reassurance given.      # Neoplasm of unspecified behavior of the skin (D49.2) on the R distal forearm. The differential diagnosis includes inflamed nevus vs dysplastic nevus vs other.   - shave biopsy performed today, see procedure note below    # Eczematous Dermatitis, secondary to xerosis  - recommending moisturizer  - patient can use Hydrocortisone to treat itching    # Seborrheic Keratosis on dorsal hands  - recommending OTC Differin  gel    Procedures Performed:   - Shave biopsy: After discussion of benefits and risks including but not limited to bleeding, infection, scar, incomplete removal, recurrence, and non-diagnostic biopsy, written consent and photographs were obtained. The area was cleaned with isopropyl alcohol. < 1mL of 1% lidocaine with epinephrine was injected to obtain adequate anesthesia. A shave biopsy was performed. Hemostasis was achieved with aluminium chloride. Vaseline and a sterile dressing were applied. The patient tolerated the procedure and no complications were noted. The patient was provided with verbal and written post care instructions.       Follow-up: 3 month (s) in-person, or earlier for new or changing lesions    Staff and Scribe:   Scribe Disclosure:   By signing my name below, I, Natasha Bruno, attest that this documentation has been prepared under the direction and in the presence of Aline Teague PA-C.  - Electronically Signed: Natasha Bruno 08/06/24       Provider Disclosure:  I agree with above History, Review of Systems, Physical exam and Plan.  I have reviewed the content of the documentation and have edited it as needed. I have personally performed the services documented here and the documentation accurately represents those services and the decisions I have made.      Electronically signed by:  All risks, benefits and alternatives were discussed with patient.  Patient is in agreement and understands the assessment and plan.  All questions were answered.  Sun Screen Education was given.   Return to Clinic in 3 months or sooner as needed.   Aline Teague PA-C        ____________________________________________    CC: Skin Check ([facility] [place], c/o some dry spots on R upper arm)    HPI:  Ms. Shanelle Sepulveda is a(n) 73 year old female who presents today as a return patient for a skin check.      Patient reports new presence of scabs on one side of her arm that are concerning.    Patient is otherwise  feeling well, without additional skin concerns.     Labs Reviewed:  N/A    Physical exam:  Vitals: LMP 10/24/2005   GEN: This is a well developed, well-nourished female in no acute distress, in a pleasant mood.    SKIN: Full skin, which includes the head/face, both arms, chest, back, abdomen,both legs, genitalia and/or groin buttocks, digits and/or nails, was examined.  - xerosis on UE  - faint pink scaly papules on R upper arm  - R distal forearm pink and brown papule 4 mm   Linear scars on arms from previous skin cancers and left lower back  - There is no lymphadenopathy on palpation of cervical, post auricular, occipital, axillary, inguinal, and popliteal nodes.  - There are dome shaped bright red papules on the head/neck, trunk, extremities.   - Multiple regular brown pigmented macules and papules are identified on the head/neck, trunk, extremities.   - Scattered brown macules on sun exposed areas.  - There are waxy stuck on tan to brown papules on the head/neck, trunk, extremities.  - No other lesions of concern on areas examined.           Medications:  Current Outpatient Medications   Medication Sig Dispense Refill    ASPIRIN PO Take 81 mg by mouth daily      Cholecalciferol (VITAMIN D3) 50 MCG (2000 UT) CAPS       fish oil-omega-3 fatty acids 1000 MG capsule Take 1 capsule (1 g) by mouth 3 times daily 120 capsule 11    ketorolac (ACULAR) 0.5 % ophthalmic solution       lisinopril (ZESTRIL) 40 MG tablet Take 1 tablet (40 mg) by mouth daily 90 tablet 3    metoprolol succinate ER (TOPROL XL) 200 MG 24 hr tablet Take 1 tablet (200 mg) by mouth daily 90 tablet 3    moxifloxacin (VIGAMOX) 0.5 % ophthalmic solution       Multiple Vitamins-Minerals (MULTIVITAL PO) Take  by mouth.      pravastatin (PRAVACHOL) 20 MG tablet Take 1 tablet (20 mg) by mouth daily 90 tablet 3    prednisoLONE acetate (PRED FORTE) 1 % ophthalmic suspension       venlafaxine (EFFEXOR XR) 150 MG 24 hr capsule TAKE 1 CAPSULE EVERY DAY 90  capsule 3     No current facility-administered medications for this visit.      Past Medical History:   Patient Active Problem List   Diagnosis    Generalized anxiety disorder    Overactive bladder    Atrophic vaginitis    Hyperlipidemia with target LDL less than 130    HTN, goal below 140/90    Urgency-frequency syndrome    Family history of malignant neoplasm of breast    Cervical cancer screening    Major depression in complete remission (H24)    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, including shoulder, left (H)    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, including shoulder, right (H)    Class 2 severe obesity due to excess calories with serious comorbidity in adult (H)     Past Medical History:   Diagnosis Date    Intramural leiomyoma of uterus     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, including shoulder, left (H) 9/21/2023    MENOPAUSAL DISORDER NOS 8/10/2005    Menses getting much heavier, pelvic sono 02/06--2 uterine fibroids seen, 1.5 and 2.7cm each; simple left ovarian cyst 4.2 cm, 2.6 cm complex right ovarian cyst--endometrial biopsy benign    Symptomatic menopausal or female climacteric states        CC No referring provider defined for this encounter. on close of this encounter.

## 2024-08-14 DIAGNOSIS — C43.61 MALIGNANT MELANOMA OF RIGHT FOREARM (H): Primary | ICD-10-CM

## 2024-08-15 ENCOUNTER — TELEPHONE (OUTPATIENT)
Dept: DERMATOLOGY | Facility: CLINIC | Age: 74
End: 2024-08-15
Payer: COMMERCIAL

## 2024-08-15 ENCOUNTER — PATIENT OUTREACH (OUTPATIENT)
Dept: ONCOLOGY | Facility: CLINIC | Age: 74
End: 2024-08-15
Payer: COMMERCIAL

## 2024-08-15 NOTE — TELEPHONE ENCOUNTER
Called patient to schedule surgery with Dr. Christian    Date of Surgery: 08/26    Surgery type: excision    Consult scheduled: No    Has patient had mohs with us before? Not Applicable    Additional comments: maral Gill on 8/15/2024 at 12:21 PM

## 2024-08-15 NOTE — PROGRESS NOTES
Writer received Cancer Risk Management Program referral, referred for:    Malignant melanoma of right forearm (H)   Referred By    Provider Department Location Phone   Aline Teague PA-C [facility] [phone]        Reviewed for appropriate plan, and sent to New Patient Scheduling for completion.

## 2024-08-26 ENCOUNTER — VIRTUAL VISIT (OUTPATIENT)
Dept: ONCOLOGY | Facility: CLINIC | Age: 74
End: 2024-08-26
Attending: PHYSICIAN ASSISTANT
Payer: COMMERCIAL

## 2024-08-26 ENCOUNTER — OFFICE VISIT (OUTPATIENT)
Dept: DERMATOLOGY | Facility: CLINIC | Age: 74
End: 2024-08-26
Payer: COMMERCIAL

## 2024-08-26 ENCOUNTER — TELEPHONE (OUTPATIENT)
Dept: DERMATOLOGY | Facility: CLINIC | Age: 74
End: 2024-08-26

## 2024-08-26 VITALS — DIASTOLIC BLOOD PRESSURE: 90 MMHG | HEART RATE: 71 BPM | SYSTOLIC BLOOD PRESSURE: 151 MMHG

## 2024-08-26 DIAGNOSIS — Z80.8 FAMILY HISTORY OF MELANOMA: ICD-10-CM

## 2024-08-26 DIAGNOSIS — C43.61 MALIGNANT MELANOMA OF RIGHT FOREARM (H): Primary | ICD-10-CM

## 2024-08-26 DIAGNOSIS — Z80.3 FAMILY HISTORY OF MALIGNANT NEOPLASM OF BREAST: ICD-10-CM

## 2024-08-26 DIAGNOSIS — Z80.0 FAMILY HISTORY OF PANCREATIC CANCER: ICD-10-CM

## 2024-08-26 PROCEDURE — 88342 IMHCHEM/IMCYTCHM 1ST ANTB: CPT | Mod: TC | Performed by: DERMATOLOGY

## 2024-08-26 PROCEDURE — 88342 IMHCHEM/IMCYTCHM 1ST ANTB: CPT | Mod: 26 | Performed by: DERMATOLOGY

## 2024-08-26 PROCEDURE — 96040 HC GENETIC COUNSELING, EACH 30 MINUTES: CPT | Mod: TEL | Performed by: GENETIC COUNSELOR, MS

## 2024-08-26 PROCEDURE — 12032 INTMD RPR S/A/T/EXT 2.6-7.5: CPT | Mod: GC | Performed by: DERMATOLOGY

## 2024-08-26 PROCEDURE — 88341 IMHCHEM/IMCYTCHM EA ADD ANTB: CPT | Mod: 26 | Performed by: DERMATOLOGY

## 2024-08-26 PROCEDURE — 11603 EXC TR-EXT MAL+MARG 2.1-3 CM: CPT | Mod: GC | Performed by: DERMATOLOGY

## 2024-08-26 PROCEDURE — 88305 TISSUE EXAM BY PATHOLOGIST: CPT | Mod: 26 | Performed by: DERMATOLOGY

## 2024-08-26 NOTE — PROGRESS NOTES
Virtual Visit Details    Type of service:  Telephone Visit   Phone call duration: 49 minutes   Originating Location (pt. Location): Home  Distant Location (provider location):  Off-site    8/26/2024    Referring Provider: Aline Teague PA-C     Presenting Information:   I spoke with Shanelle Sepulveda over the phone today for genetic counseling to discuss her personal and family history of cancer. This appointment was conducted virtually due to COVID-19 precautions. We talked today to review this history, cancer screening recommendations, and available genetic testing options.    Personal History:  Shanelle is a 73 year old female. 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 Left lower back: Melanoma in situ.   9/5/23: Right upper arm: Malignant melanoma. Left upper arm: Malignant melanoma.    She will continue with skin exams every 3 months.     She had her first menstrual period at age 17, her first child at age 24, and is postmenopausal (age 55). Shanelle underwent hysterectomy and bilateral salpingo-oophorectomy on 4/18/06 due to fibroids. She reports that she has not used hormone replacement therapy or oral contraceptives. She has clinical breast exams and mammograms; her most recent mammogram on 1/22/24 was negative. She had Cologuard testing in 2023 which was negative. Her most recent colonoscopy on 6/16/16 detected no polyps and follow-up was recommended in 10 years. A previous colonoscopy in 2004 was normal. Shanelle reported no history of tobacco use and occasional alcohol use.    Family History: (Please see scanned pedigree for detailed family history information)  Siblings:  Her sister is 79 years old and was diagnosed with breast cancer in her 70s. Treatment included radiation.   Another sister is in her 70s and was diagnosed with breast cancer in  her 60s. Treatment included radiation and possibly chemotherapy.   A third sister is 72 years old and was diagnosed with melanoma in her 20s in the 1970s.  Maternal:  Her mother was diagnosed with pancreatic cancer at age 66 and passed away in her 60s. She had no known history of smoking or other exposures.   Shanelle is not aware of any other cancers in her maternal relatives, but reports that she has limited information about her cousins.   Paternal:  Her father was diagnosed with liver cancer in his early 80s and passed away at age 83. He had no known history of smoking or other exposures.   One aunt may have had a cancer in her 60s (type unknown to Shanelle) and passed away in approximately her mid 60s.     Shanelle is not aware of any genetic testing for inherited cancer risk in any of her family members.     Her maternal ethnicity is Kh[place]. Her paternal ethnicity is [place], [place]. There is no known Ashkenazi Mandaen ancestry on either side of her family.     Discussion:  Shanelle's personal and family history of cancer is suggestive of a hereditary cancer syndrome.  We reviewed the features of sporadic, familial, and hereditary cancers. In looking at Shanelle's family history, it is possible that a cancer susceptibility gene is present due to her personal history of four melanomas since September 2023 and her family history of melanoma, breast, and pancreatic cancer.  We discussed the natural history and genetics of hereditary cancer. Based on her personal and family history, we discussed genes in which mutations are associated with an increased risk for melanoma, pancreatic, and breast cancer, such as:  Familial Atypical Multiple Mole Melanoma Syndrome (FAMMM) is caused by mutations in the CDKN2A gene. The lifetime risk for melanoma is between 28-76% (PMID 96492089, PMID 87382668). Some studies indicate that these risks may vary, due to different factors. Individuals with FAMMM typically have many moles (more  than 50), which can be atypical. People with FAMMM have increased risks for pancreatic cancer, and possibly other cancers. The exact risk of pancreatic cancer can depend on a person s specific CDKN2A mutation, but has been reported as about 17% by age 75 by one larger study (PMID 55596420). The National Comprehensive Cancer Network (NCCN) states that the lifetime risk of pancreatic cancer is greater than 15%. We discussed screening for melanoma (frequent skin exams) and pancreatic cancer (endoscopic ultrasonography (EUS) and/or MRI/magnetic resonance).    Mutations in the BRCA1 and BRCA2 genes cause a condition known as Hereditary Breast and Ovarian Cancer syndrome (HBOC). Women with a mutation in either of these genes are at increased risk for breast and ovarian cancer. There is also an increased risk for a second primary breast cancer. Men with a mutation in either of these genes are at increased risk for breast and prostate cancer. Both women and men may also be at increased risk for pancreatic cancer and melanoma.   Detailed handouts regarding these genes and other genes in which mutations are associated with an increased risk for melanoma, breast, and pancreatic cancer will be provided to Shanelle via [employer] and can be found in the after visit summary. Topics included: inheritance pattern, cancer risks, cancer screening recommendations, and also risks, benefits and limitations of testing.   Based on her personal and family history, Shanelle meets current National Comprehensive Cancer Network (NCCN) criteria for genetic testing of melanoma susceptibility genes, as well as pancreatic and breast cancer susceptibility genes.    We discussed that there are additional genes that could cause increased risk for melanoma, breast, pancreatic, liver, and other cancers. As many of these genes present with overlapping features in a family and accurate cancer risk cannot always be established based upon the pedigree  analysis alone, it would be reasonable for Shanelle to consider panel genetic testing to analyze multiple genes at once.  We reviewed genetic testing options for Shanelle based on her personal and family history: a panel of genes associated with an increased risk for certain cancers, or larger panel options to include genes associated with increased risk for multiple different cancer types. She expressed an interest in more broad testing and opted for a Custom Panel (a combination of the Common Hereditary Cancers Panel + Hereditary Skin Cancer Panel, including preliminary-evidence genes for skin cancer).  Genetic testing is available for 48 genes associated with cancers of the breast, ovary, uterus, prostate and gastrointestinal system: [facility] Common Hereditary Cancers panel (APC, SHALINI, AXIN2, BAP1, BARD1, BMPR1A, BRCA1, BRCA2, BRIP1, CDH1, CDK4, CDKN2A, CHEK2, CTNNA1, DICER1, EPCAM, FH, GREM1, HOXB13, KIT, MBD4, MEN1, MLH1, MSH2, MSH3, MSH6, MUTYH, NF1, NTHL1, PALB2, PDGFRA, PMS2, POLD1, POLE, PTEN, RAD51C, RAD51D, SDHA, SDHB, SDHC, SDHD, SMAD4, SMARCA4, STK11, TP53, TSC1, TSC2, VHL).    We discussed that many of these genes are associated with specific hereditary cancer syndromes and published management guidelines: Hereditary Breast and Ovarian Cancer syndrome (BRCA1, BRCA2), Mcdermott syndrome (MLH1, MSH2, MSH6, PMS2, EPCAM), Familial Adenomatous Polyposis (APC), Hereditary Diffuse Gastric Cancer (CDH1), Familial Atypical Multiple Mole Melanoma syndrome (CDK4, CDKN2A), Multiple Endocrine Neoplasia type 1 (MEN1), Juvenile Polyposis syndrome (BMPR1A, SMAD4), Cowden syndrome (PTEN), Li Fraumeni syndrome (TP53), Hereditary Paraganglioma and Pheochromocytoma syndrome (SDHA, SDHB, SDHC, SDHD), Peutz-Jeghers syndrome (STK11), MUTYH Associated Polyposis (MUTYH), Tuberous sclerosis complex (TSC1, TSC2), Von Hippel-Lindau disease (VHL), and Neurofibromatosis type 1 (NF1).   The SHALINI, AXIN2, BAP1, BRIP1, CHEK2, FH, GREM1, MBD4,  MSH3, NTHL1, PALB2, POLD1, POLE, RAD51C, and RAD51D genes are associated with increased cancer risk and have published management guidelines for certain cancers.   The remaining genes (BARD1, CTNNA1, DICER1, HOXB13, KIT, PDGFRA, and SMARCA4) are associated with increased cancer risk and may allow us to make medical recommendations when mutations are identified.   As noted above, her testing will also include analysis of additional genes associated with an increased risk for melanoma.   Due to COVID-19 precautions consent was obtained over the phone/video today. Genetic testing via a Custom Panel (a combination of the Common Hereditary Cancers Panel + Hereditary Skin Cancer Panel, including preliminary-evidence genes for skin cancer) will be sent to [facility]. Shanelle opted to schedule a blood draw for testing. Turnaround time from date when sample is received at the lab: approximately 3-4 weeks.  Medical Management: For Shanelle, we reviewed that the information from genetic testing may determine:  additional cancer screening for which Shanelle may qualify (i.e. mammogram and breast MRI, pancreatic cancer screening, more frequent colonoscopies, more frequent dermatologic exams, etc.),  options for risk reducing surgeries Shanelle could consider (i.e. bilateral mastectomy, etc.),    and targeted chemotherapies if she were to develop certain cancers in the future (i.e. immunotherapy for individuals with Mcdermott syndrome, PARP inhibitors, etc.).   These recommendations will be discussed in detail once genetic testing is completed.     Plan:  1) Today Shanelle elected to proceed with genetic testing via a Custom Panel (a combination of the Common Hereditary Cancers Panel + Hereditary Skin Cancer Panel, including preliminary-evidence genes for skin cancer) offered by [facility].  2) This information should be available in 4-5 weeks.  3) Shanelle will be scheduled for a virtual visit (phone or  video) to discuss the results.    Suzanne Leon MS, [facility] [place]  Licensed, Certified Genetic Counselor  Office: [phone]  Email: [email or web address]

## 2024-08-26 NOTE — PATIENT INSTRUCTIONS
Assessing Cancer Risk  Only about 5-10% of cancers are thought to be due to an inherited cancer susceptibility gene.    These families often have:  Several people with the same or related types of cancer  Cancers diagnosed at a young age (before age 50)  Individuals with more than one primary cancer  Multiple generations of the family affected with cancer    Melanoma Genes  We each inherit two copies of every gene in our bodies: one from our mother, and one from our father.  Each gene has a specific job to do.  When a gene has a mistake or  mutation  in it, it does not work like it should.  When someone inherits a mutation in a melanoma gene, this increases their risk to develop melanoma above that of the general population risk (about 2.2% lifetime risk).  Inheriting a gene mutation does not guarantee that a person will develop melanoma or other associated cancers, but it does significantly increase their risk above that of the general population.    Below is a list of genes commonly associated with melanoma. A single mutation in any of these genes increases the risk for melanoma, among other cancers.     BAP1  BAP1-tumor predisposition syndrome (BAP1-TPDS) is caused by mutations in one copy of the BAP1 gene. It is associated with an increased risk for multiple skin findings such as atypical Spitz tumors, cutaneous (skin) melanoma, and basal cell carcinoma, as well as other cancers, including uveal (eye) melanoma, malignant mesothelioma, and kidney cancer. The exact lifetime risks for these cancers are unknown at this time. Other suspected cancer risks include breast cancer, cholangiocarcinoma, non-small cell lung cancer, meningioma and neuroendocrine tumors.       BRCA2  Mutations in one copy of the BRCA2 gene result in Hereditary Breast and Ovarian Cancer (HBOC) syndrome. Individuals with HBOC have elevated breast, ovarian, male breast, prostate, pancreatic, and melanoma risks. Risks for both cutaneous (skin)  and ocular (eye) melanoma may be elevated in some families with a BRCA2 mutation, but not all.     General Population BRCA2 Mutation   Breast 12% >60%   Ovarian 1-2% 13-29%   Male Breast <1% 1.8-7.1%   Prostate 16% 19-61%   Pancreatic 2-3% 5-10%   Melanoma 2.2% Increased     CDK4  Individuals with a mutation in one copy of their CDK4 gene have familial cutaneous malignant melanoma. They are at an increased risk for developing atypical moles and melanoma, though the exact lifetime risk has not yet been defined.     CDKN2A  Mutations in one copy of the CDKN2A gene are associated with Familial Atypical Multiple Mole Melanoma (FAMMM) syndrome. It is characterized by multiple melanocytic nevi and a family history of melanoma. The likelihood of developing melanoma has been estimated to be 28-76% depending on other risk factors, including family history, geographic location, and other genetic modifiers. CDKN2A mutations are also associated with an increased risk for pancreatic cancer, among others. The likelihood of developing pancreatic cancer has been estimated to be >15%; in some families this risk may be higher.    MITF  Individuals with the E318K mutation in the MITF gene have an elevated risk for cutaneous melanoma and renal cell carcinoma, however these risk numbers are not well defined. The risk for melanoma has been estimated to be up to a 2-8 fold increased risk compared to the general population risk. Individuals who carry this MITF gene mutation may have an up to 5-fold increased risk for renal cell carcinoma. Other mutations in the MITF gene are not currently known to be associated with this increased risk.    PTEN  PTEN hamartoma tumor syndrome (PHTS) (also known as Cowden syndrome) is caused by a mutation in the PTEN gene. This condition increases the risk for breast, thyroid, endometrial, colon, melanoma, and kidney cancer. Other benign features seen in some individuals with PHTS include benign skin lesions  (facial papules, trichilemmomas, keratoses, lipomas), learning disability, autism, thyroid nodules, colon polyps, and larger head size.   Lifetime Cancer Risks    General Population PTEN Mutation   Breast 12% 40-60%*   Thyroid 1% Up to 38%   Renal 1-2% Up to 35%   Endometrial 3% Up to 28%   Colon 5% Up to 9%   Melanoma 2-3% Up to 6%   *Emerging data suggests the risk for breast cancer could be greater than 60%    RB1  Retinoblastoma is caused by mutations in one copy of the RB1 gene. It is characterized by a significantly increased risk for malignant tumors in the retina, typically found in young children. Melanomas, osteosarcomas, and soft tissues sarcomas can also be seen in affected individuals, though the exact lifetime risks are unknown.     TP53  Mutations in one copy of the TP53 gene cause Li-Fraumeni syndrome (LFS). LFS is associated with the development of many different types of cancer (i.e. sarcomas, adrenocortical tumors, breast cancer) beginning in childhood or young adulthood. Increased risk for melanoma has also been reported in families with LFS.     Genetic Testing  Genetic testing involves a blood test and will look for any harmful mutations that are associated with increased cancer risk. If possible, it is recommended that the person(s) who has had cancer be tested before other family members. That person will give us the most useful information about whether or not a specific gene is associated with the cancer in the family.     Results  There are three possible results of genetic testing:  Positive--a harmful mutation was identified  Negative--no mutation was identified  Variant of unknown significance--a variation in one of the genes was identified, but it is unclear how this impacts cancer risk in the family    Advantages and Disadvantages  There are advantages and disadvantages to genetic testing.    Advantages  May clarify your cancer risk  Can help you make medical decisions  May explain  the cancers in your family  May give useful information to your family members (if you share your results)    Disadvantages  Possible negative emotional impact of learning about inherited cancer risk  Uncertainty in interpreting a negative test result in some situations  Possible genetic discrimination concerns (see below)    Inheritance   All of the cancer syndromes reviewed above are inherited in an autosomal dominant pattern. This means that if a parent has a mutation, each of their children will have a 50% chance of inheriting that same mutation.  Therefore, each child -- of any gender -- would have a 50% chance of being at increased risk for developing cancer.                                            Image obtained from Genetics Home Reference, 2013       Genetic Information Nondiscrimination Act (EUN)  The Genetic Information Nondiscrimination Act of 2008 (EUN) is a federal law that protects individuals from health insurance or employment discrimination based on a genetic test result alone (with some exceptions, including employers with fewer than 15 employees, and ). Although rare, EUN does not cover discrimination protections in terms of life insurance, long term care, or disability insurances. Visit the National Human Genome Research [place] website to learn more.    Reducing Cancer Risk  If a harmful mutation is found in a cancer risk gene, there may be certain screening recommendations or risk-reducing surgeries that can be offered. For example, screening recommendations for individuals with a melanoma-associated gene mutation may include dermatology exams, ophthalmology exams, or other screening, depending on the gene mutation identified. This information will be discussed after genetic testing is completed.    If no mutations are found on genetic testing, screening is then recommended based on personal and/or family history of cancer.     All individuals with a gene mutation should talk  to their doctor or genetic counselor about mutation specific screening as different mutations present with different risks and screening recommendations. In addition, the age at which to start screening may be modified based on family history of young cancer.    Questions to Think About Regarding Genetic Testing  What effect will the test result have on me and my relationship with my family members if I have an inherited gene mutation?  If I don t have a gene mutation?  Should I share my test results, and how will my family react to this news, which may also affect them?  Are my children ready to learn new information that may one day affect their own health?    Resources  Melanoma Research Foundation https://www.melanoma.org/   AIM at Melanoma Foundation https://www.aimatmelanoma.org/   American Cancer Society (ACS) cancer.org   National Cancer [place] (NCI) cancer.gov     Please call us if you have any questions or concerns.   Cancer Risk Management Program [phone]-CANCER ([phone])  Dante Delgado, MS [facility]  [phone]  Sheeba Bravo, MS, [facility] [phone]  Inessa Cohen, MS, [facility]  [phone]  Lena Silverio, MS, [facility]  [phone]  Suzanne Leon, MS, [facility]  [phone]  Venus Santos, MS, [facility] [phone]  Cassandra Wilkins, MS, [facility] [phone]    References  Duran E, Glenis P, Zuleyma K, Ridge A, Jacqui H. Hereditary Melanoma: Update on Syndromes and Management - Genetics of familial atypical multiple mole melanoma syndrome. Journal of the American Academy of Dermatology. 2016;74(3):395-407. doi:10.1016/j.jaad.2015.08.038.  Rolf K, Diogenes R, Roxann CM, Trang-Wallis MH. Comprehensive review of BAP1 tumor predisposition syndrome with report of two new cases. Clinical genetics. 2016;89(3):285-294. doi:10.1111/cge.70825.  Kelly, Shila, Sang, Tj, Arabella, Debora, . . . Brendan. (2013). Cancer Risks for BRCA1 and BRCA2 Mutation Carriers: Results From Prospective Analysis of EMBRACE. Journal Of The National  Cancer [place], 105(11), 812-822.  Marisol Toledo, George Busby, Peteshantelle Mack, Jody Bingham, Rosalia Ortega, Joe Ratliff, . . . Jack Higuera. (1996). Germline mutations in the [id number] binding domain of CDK4 in familial melanoma. Nature Genetics, 12(1), 97-99.  RAVIN Santiago, BERNIE Tang, RAVIN Reno, BECKY Guillermo, YAZ Bradley., Spatz, A., . . . BECKY Muro (2007). BRCA1, BRCA2, TP53, and CDKN2A germline mutations in patients with breast cancer and cutaneous melanoma. Familial Cancer, 6(4), 453-461.  BERNIE Reno, & ALESHA Reyes (2012). Genetic alterations of PTEN in human melanoma. Cellular and Molecular Life Sciences, 69(9), 6689-6346.  Gretchen Koenig, Altagracia Callaway, Bree Etienne, Bucky Kahn, Misty Negron, Lexi Hoyos, . . . Noemi Mireles. (2011). A SUMOylation-defective MITF germline mutation predisposes to melanoma and renal carcinoma. Nature, 480(5789), 94-8.  VJ Wright., BERNIE Valdivia, BECKY Wilkins, & YAZ Blas. (2008). Identification of [id number] , QRIRH6T , FGFR3, and RB1 mutations in melanoma by inhibition of nonsense-mediated mRNA decay. Genes, Chromosomes and Cancer, 47(12), 2813-1666.  WILL Bermudez, & David C. (2015). Cowden syndrome: Recognizing and managing a not?so?rare hereditary cancer syndrome. Journal of Surgical Oncology, 111(1), 125-130.  Prevalence of the E318K MITF germline mutation in [place] melanoma patients: Associations with histological subtypes and family cancer history. (2013). Pigment Cell & Melanoma Research, 26(2), 259-262.  Duran E, Glenis P, Zuleyma K, Ridge A, Jacqui H. Hereditary Melanoma: Update on Syndromes and Management - Genetics of familial atypical multiple mole melanoma syndrome. Journal of the American Academy of Dermatology. 2016;74(3):395-407. Doi:10.1016/j.jaad.2015.08.038.  Светлана CHRISTINE, Carlos ED, Thom KG, et al. Prevalence of CDKN2A mutations in pancreatic cancer patients: implications for genetic  counseling.  Journal of Human Genetics. 2011;19(4):472-478. Doi:10.1038/ej.2010.198.  VINCENZO Loyola Demenais, Florence, Goldstein, Alisa M., Augusta Galvan Bishop, Julia Newton, Paillerets, Brigitte Bressac-de, . . . Rosalia Ortega (2002). Geographical Variation in the Penetrance of CDKN2A Mutations for Melanoma. Journal of the National Cancer [place], 94(12), 894-903.  Zachery, Byron, Otis, Joey, Wan, Mima, . . . Abhijit. (2006). High-risk Melanoma Susceptibility Genes and Pancreatic Cancer, Neural System Tumors, and Uveal Melanoma across [employer]. Cancer Research., 66(48), 6498-8773.         Assessing Cancer Risk  Only about 5-10% of cancers are thought to be due to an inherited cancer susceptibility gene. These families often have:  Several people with the same or related types of cancer  Cancers diagnosed at a young age (before age 50)  Individuals with more than one primary cancer  Multiple generations of the family affected with cancer    Some people may be candidates for genetic testing of more than one gene.  For these families, genetic testing using a cancer panel may be offered.  These panels can test many genes at once known to increase the risk for pancreatic (and other) cancers: APC, SHALINI, BRCA1, BRCA2, CDKN2A, EPCAM, MLH1, MSH2, MSH6, PALB2, PMS2, STK11, and TP53. The purpose of this handout is to serve as a brief summary of the pancreatic cancer risk genes and cancer syndrome with pancreatic cancer risks.     Hereditary Breast and Ovarian Cancer Syndrome  (BRCA1 and BRCA2)    A single mutation in one of the copies of BRCA1 or BRCA2 increases the risk for breast and ovarian cancer. The risk for pancreatic cancer approximately 5-10%. BRCA2 is considered the most common gene responsible for familial pancreatic cancer.    A person s ethnic background is also important to consider, as individuals of Ashkenazi Latter-day ancestry have a higher chance of having a BRCA gene  mutation. There are three BRCA mutations that occur more frequently in this population.    Mcdermott Syndrome  (MLH1, MSH2, MSH6, PMS2, and EPCAM)    Currently five genes are known to cause Mcdermott Syndrome: MLH1, MSH2, MSH6, PMS2, and EPCAM.  A single mutation in one of the Mcdermott Syndrome genes increases the risk for colon, endometrial, ovarian, and stomach cancers.  Other cancers that occur less commonly in Mcdermott Syndrome include urinary tract cancers, brain cancers, and other cancers. People who have Mcdermott Syndrome have up to a 6% risk of pancreatic cancer. MLH1 has the highest risk for pancreatic cancer amongst the Mcdermott syndrome genes.    *Cancer risk varies depending on Mcdermott syndrome gene found    Familial Atypical Multiple Mole Melanoma Syndrome  (CDKN2A)    Familial Atypical Multiple Mole Melanoma Syndrome (FAMMM) is caused by a single mutation in the CDKN2A gene. This gene used to be called p16. The lifetime risk for melanoma is between 58-92%5. People with FAMMM have increased risks for pancreatic cancer, and possibly other cancers.      People with FAMMM typically have many moles (more than 50), which can be atypical. The exact risk of pancreatic cancer can depend on a person s specific CDKN2A mutation. The risk for pancreatic cancer at least 15%, but may be higher depending on a person's specific mutation.    Peutz-Jeghers Syndrome  (STK11)    Peutz-Jeghers Syndrome is caused by a mutation in the STK11 gene. The main features of Peutz-Jeghers Syndrome are multiple hamartomatous colon polyps and blue pigmentation of the lips and oral mucosa. Cancers associated with Peutz-Jeghers Syndrome include: gastrointestinal, gynecological, lung, breast, and pancreatic cancer. The risk of developing pancreatic cancer at least 15% for those with Peutz-Jeghers syndrome.     Li-Fraumeni Syndrome  (TP53)    Li-Fraumeni Syndrome (LFS) is a cancer predisposition syndrome caused by a mutation in the TP53 gene. A single mutation  in one of the copies of TP53 increases the risk for multiple cancers. Individuals with LFS are at an increased risk for developing cancer at a young age. The lifetime risk for development of a LFS-associated cancer is 50% by age 30 and 90% by age 60. The risk for pancreatic cancer specifically is approximately 5-10%.  Core Cancers: Sarcomas, Breast, Brain, Lung, Leukemias/Lymphomas, Adrenocortical carcinomas  Other Cancers: Gastrointestinal (including pancreatic), Thyroid, Skin, Genitourinary    Familial Adenomatous Polyposis Syndrome  (APC)    Familial Adenomatous Polyposis syndrome (FAP) is caused by a single mutation in the APC gene and is characterize by having over 100 adenomatous polyps in the colon and significantly increased risk for colon cancer. These typically appear during adolescence. FAP is associated with other tumors in the thyroid, stomach, and duodenum. People with FAP have an increased lifetime pancreatic cancer risk over the general population, however, the exact risk is currently not known at this time.    Additional Genes  PALB2  Mutations in the PALB2 gene have been shown to increase the risk of breast cancer up to 58% in some families. PALB2 mutations have also been associated with increased risk for pancreatic cancer. Individuals who inherit two PALB2 mutations--one from their mother and one from their father--have a condition called Fanconi Anemia. This condition is associated with short stature, developmental delay, bone marrow failure, and increased risk for childhood cancers.    SHALINI  Mutations in the SHALINI gene typically increase the risk of breast cancer 2-4 times higher than an average woman. SHALINI mutations have also been associated with an increased risk of pancreatic cancer. People who inherit an SHALINI mutation from both their mother and father have a condition called ataxia-telangiectasia. Ataxia telangiectasia is associated with ataxia in childhood (trouble with balance and walking),  telangiectasias (red or purple spotty clusters on the skin), involuntary movements, frequent infections, and increased risk of leukemia and lymphoma.     Inheritance    All of the genes reviewed above are inherited in an autosomal dominant pattern.  This means that if a parent has a mutation, each of their children will have a 50% chance of inheriting that same mutation.  Every child--male or female--would have a 50% chance of inheriting the mutation and being at increased risk for developing cancer.       https://r.nlm.nih.gov/primer/inheritance/inheritancepatterns    Mutations in some genes can occur de ryan, which means that a person s mutation occurred for the first time in them and was not inherited from a parent.  Now that they have the mutation, however, it can be passed on to future generations.     Genetic Testing  Genetic testing involves a blood test and will look for any harmful mutations that are associated with increased cancer risk.  If possible, it is recommended that the person(s) who has had cancer be tested before other family members.  That person will give us the most useful information about whether or not a specific gene is associated with the cancer in the family.    Advantages and Disadvantages   There are advantages and disadvantages to genetic testing.  Advantages  May clarify your cancer risk and additional appropriate cancer screenings   Can help you make medical decisions  May explain the cancers in your family  May give useful information to your family members (if you share your results)     Disadvantages  Possible negative emotional impact of learning about inherited cancer risk  Uncertainty in interpreting a negative test result in some situations  Possible genetic discrimination concerns (see below)     Genetic Information Nondiscrimination Act (EUN)  EUN is a federal law that protects individuals from health insurance or employment discrimination based on a genetic test  result.  There are currently no legal discrimination protections in terms of life insurance, long term care, or disability insurances.  Visit the National Human Genome Research [place] website to learn more: https://www.genome.gov/68380826/genetic-discrimination/    Questions to Think About Regarding Genetic Testing:  What effect will the test result have on me and my relationship with my family members if I have an inherited gene mutation?  If I don t have a gene mutation?  Should I share my test results, and how will my family react to this news, which may also affect them?  Are my children ready to learn new information that may one day affect their own health?    There are three possible results of genetic testing:  Positive--a harmful mutation was identified in one or more of the genes  Negative--no mutation was identified in any of the genes on this panel  Variant of unknown significance (VUS)--a variation in one of the genes was identified, but it is unclear how this impacts cancer risk in the family  Families with VUS results can contact their genetic counselor annually for updates     Reducing Cancer Risk  Recommendations are based upon an individual s genetic test result as well as their personal and family history of cancer. Pancreatic cancer screening may be available based on family history. Talk with your physician about screening options.     Cancer Resources    National Pancreatic Cancer Foundation: npcf.   Pancreatic Cancer Action Network: pancan.org   FORCE: Facing Our Risk of Cancer Empowered: facingourrisk.org  [place] [place]: umu.org  Li-Fraumeni Syndrome Association: lfsassociation.org  Collaborative Group of the [place] on Inherited Colorectal Cancer (CGA)   cgaicc.com http://www.facingourrisk.org/  Cancer Care: cancercare.org  American Cancer Society (ACS): cancer.org  National Cancer [place] (NCI): cancer.gov      Please call us if you have any questions or concerns.   Cancer  Risk Management Program [phone]-CANCER ([phone])  Dante Delgado, MS [facility]  [phone]  Sheeba Bravo, MS, [facility] [phone]  Inessa Cohen, MS, [facility]  [phone]  Lena Silverio, MS, [facility]  [phone]  Suzanne Leon, MS, [facility]  [phone]  Venus Santos, MS, [facility] [phone]  Cassandra Wilkins, MS, [facility] [phone]    References  Genetic / Familial High-Risk Assessment?: Breast and Ovarian. NCCN Pract Guidel Oncol. 2017;1.2018.  Chandra J, Bernard A, Telma J, et al. The incidence of pancreatic cancer in BRCA1 and BRCA2 mutation carriers. Br J Cancer. 2012;107(12):3674-5930. doi:10.1038/bjc.2012.483.  Alberto DB, Thom KG, Rose Marie S, et al. BRCA1, BRCA2, PALB2, and CDKN2A mutations in familial pancreatic cancer: a PACGENE study. Kate Med. 2015;17(7):569-577. doi:10.1038/gim.2014.153.  Cas HAMM. Genetic / Familial High-Risk Assessment?: Colorectal. NCCN Pract Guidel Oncol. 2017;2.2017.  Walter MONTEIRO,  M, Jessica E, Chilo J. Familial Atypical Multiple Mole Melanoma Syndrome. National Center for Biotechnology Information (); 2009. http://www.ncbi.nlm.nih.gov/pubmed/02279006. Accessed October 10, 2017.  Mcdermott HT, Addison RM, Ana J, et al. Tumour spectrum in the FAMMM syndrome. Br J Cancer. 1981;44(4):553-560. http://www.ncbi.nlm.nih.gov/pubmed/2799980. Accessed October 10, 2017.  Alvarado MANNING, Kip MENCHACA, Susan A, et al. Very high risk of cancer in familial Peutz-Jeghers syndrome. Gastroenterology. 2000;119(6):7153-7581. doi:10.1053/PRADEEP.2000.58388.  Giardiello FM, Offerhaus GJ, Cristopher DH, et al. Increased risk of thyroid and pancreatic carcinoma in familial adenomatous polyposis. Gut. 1993;34(10):1223-7372. doi:10.1136/GUT.34.10.1394.         Assessing Cancer Risk  Cancer is a common diagnosis which impacts many families.  Individuals may develop cancer due to environmental factors (such as exposures and lifestyle), aging, genetic predisposition, or a combination of these factors.      Only about 5-10% of  cancers are thought to be due to an inherited cancer susceptibility gene.    These families often have:  Several people with the same or related types of cancer  Cancers diagnosed at a young age (before age 50)  Individuals with more than one primary cancer  Multiple generations of the family affected with cancer    Comprehensive Breast and Gynecologic Cancer Panel  We each inherit two copies of every gene in our bodies: one from our mother, and one from our father. Each gene has a specific job to do.  When a gene has a mistake or  mutation  in it, it does not work like it should.     Some people may be candidates for genetic testing of more than one gene.  For these families, genetic testing using a cancer panel may be offered. These panels will test different genes at once known to increase the risk for breast, ovarian, uterine, and/or other cancers.    This handout will review common hereditary breast and gynecologic cancer syndromes. The genes that will be discussed in this handout are: SHALINI, BRCA1, BRCA2, BRIP1, CDH1, CHEK2, MLH1, MSH2, MSH6, PMS2, EPCAM, PTEN, PALB2, RAD51C, RAD51D, and TP53.    The purpose of this handout is to serve as a brief summary of the breast and gynecologic cancer risk genes that have published clinical management guidelines for individuals who are found to carry a mutation. Inheriting a mutation does not mean a person will develop cancer, but it does significantly increase their risk above the general population risk.     ______________________________________________________________________________    Hereditary Breast and Ovarian Cancer Syndrome (BRCA1 and BRCA2)  A single mutation in one of the copies of BRCA1 or BRCA2 increases the risk for breast and ovarian cancer, among others.  The risk for pancreatic cancer and melanoma may also be slightly increased in some families.  The chart below shows the chance that someone with a BRCA mutation would develop cancer in his or her  lifetime1,2,3,4.       Lifetime Cancer Risks    General Population BRCA1  BRCA2   Breast  12% >60% >60%   Ovarian  1-2% 39-58% 13-29%   Prostate 12% 7-26% 19-61%   Male Breast 0.1% 0.2-1.2% 1.8-7.1%   Pancreas 1-2% Up to 5% 5-10%     A person s ethnic background is also important to consider, as individuals of Ashkenazi Hoahaoism ancestry have a higher chance of having a BRCA gene mutation.  There are three BRCA mutations that occur more frequently in this population.      Mcdermott Syndrome (MLH1, MSH2, MSH6, PMS2, and EPCAM)  Currently five genes are known to cause Mcdermott Syndrome: MLH1, MSH2, MSH6, PMS2, and EPCAM.  A single mutation in one of the Mcdermott Syndrome genes increases the risk for colon, endometrial, ovarian, and stomach cancers.  Other cancers that occur less commonly in Mcdermott Syndrome include urinary tract, skin, and brain cancers.  The chart below shows the chance that a person with Mcdermott syndrome would develop cancer in his or her lifetime5.      Lifetime Cancer Risks    General Population Mcdermott Syndrome   Colon 5% 10-61%   Endometrial 3% 13-57%   Ovarian 1-2% 1-38%   Stomach <1% 1-9%   *Cancer risk varies depending on Mcdermott syndrome gene found      Cowden Syndrome (PTEN)  Cowden syndrome is a hereditary condition that increases the risk for breast, thyroid, endometrial, colon, and kidney cancer.  Cowden syndrome is caused by a mutation in the PTEN gene.  A single mutation in one of the copies of PTEN causes Cowden syndrome and increases cancer risk.  The chart below shows the chance that someone with a PTEN mutation would develop cancer in their lifetime6,7.  Other benign features seen in some individuals with Cowden syndrome include benign skin lesions (facial papules, keratoses, lipomas), learning disability, autism, thyroid nodules, colon polyps, and larger head size.     Lifetime Cancer Risks    General Population Cowden   Breast 12% 40-60%*   Thyroid 1% Up to 38%   Renal 1-2% Up to 35%   Endometrial  3% Up to 28%   Colon 5% Up to 9%   Melanoma 2-3% Up to 6%   *Emerging data suggests the risk for breast cancer could be greater than 60%               Li-Fraumeni Syndrome (TP53)  Li-Fraumeni Syndrome (LFS) is a cancer predisposition syndrome caused by a mutation in the TP53 gene. A single mutation in one of the copies of TP53 increases the risk for multiple cancers. Individuals with LFS are at an increased risk for developing cancer at a young age. The lifetime risk for development of a LFS-associated cancer is 50% by age 30 and 90% by age 60.   Core Cancers: Sarcomas, Breast, Brain, Lung, Leukemias/Lymphomas, Adrenocortical carcinomas  Other Cancers: Gastrointestinal, Thyroid, Skin, Genitourinary       Hereditary Diffuse Gastric Cancer (CDH1)  Currently, one gene is known to cause hereditary diffuse gastric cancer (HDGC): CDH1.  Individuals with HDGC are at increased risk for diffuse gastric cancer and lobular breast cancer. Of people diagnosed with HDGC, 30-50% have a mutation in the CDH1 gene.  This suggests there are likely other genes that may cause HDGC that have not been identified yet.      Lifetime Cancer Risks    General Population HDGC   Diffuse Gastric  <1% ~80%   Breast 12% 41-60%       Additional Genes    SHALINI  SHALINI is a moderate-risk breast cancer gene. Women who have a mutation in SHALINI can have between a 2-4 fold increased risk for breast cancer compared to the general population8. SHALINI mutations have also been associated with increased risk for pancreatic cancer between 5-10%9. Individuals who inherit two SHALINI mutations have a condition called ataxia-telangiectasia (AT).  This rare autosomal recessive condition affects the nervous system and immune system, and is associated with progressive cerebellar ataxia beginning in childhood. Individuals with ataxia-telangiectasia often have a weakened immune system and have an increased risk for childhood cancers.    PALB2  Mutations in PALB2 have been shown to  increase the risk of breast cancer up to 41-60% in some families; where individuals fall within this risk range is dependent upon family [id number]. PALB2 mutations have also been associated with increased risk for pancreatic cancer between 5-10%.  Individuals who inherit two PALB2 mutations--one from their mother and one from their father--have a condition called Fanconi Anemia.  This rare autosomal recessive condition is associated with short stature, developmental delay, bone marrow failure, and increased risk for childhood cancers.    CHEK2   CHEK2 is a moderate-risk breast cancer gene.  Women who have a mutation in CHEK2 have around a 2-4 fold increased risk for breast cancer compared to the general population, and this risk may be higher depending upon family history.11,12,13 The risk of colon cancer may be twice as high as the general population risk of colon cancer of 5%. Mutations in CHEK2 have also been shown to increase the risk of other cancers, including prostate, however these cancer risks are currently not well understood.    BRIP1, RAD51C and RAD51D  Mutations in RAD51C and RAD51D have been shown to increase the risk of ovarian cancer and breast cancer 14,. Mutations in BRIP1 have been shown to increase the risk of ovarian cancer and possibly female breast cancer 15 .       Lifetime Cancer Risk    General Population        BRIP1   RAD51C  RAD51D   Breast 12% Not well defined 20-40% 20-40%   Ovarian 1-2% 5-15% 10-15% 10-20%     ______________________________________________________________  Inheritance  All of the cancer syndromes reviewed above are inherited in an autosomal dominant pattern.  This means that if a parent has a mutation, each of their children will have a 50% chance of inheriting that same mutation. Therefore, each child --male or female-- would have a 50% chance of being at increased risk for developing cancer.    Image obtained from Genetics Home Reference, 2013     Mutations in some  genes can occur de ryan, which means that a person s mutation occurred for the first time in them and was not inherited from a parent.  Now that they have the mutation, however, it can be passed on to future generations.    Genetic Testing  Genetic testing involves a blood test and will look for any harmful mutations that are associated with increased cancer risk.  If possible, it is recommended that the person(s) who has had cancer be tested before other family members.  That person will give us the most useful information about whether or not a specific gene is associated with the cancer in the family.    Results  There are three possible results of genetic testing:  Positive--a harmful mutation was identified in one or more of the genes  Negative--no mutations were identified in any of the genes tested  Variant of unknown significance--a variation in one of the genes was identified, but it is unclear how this impacts cancer risk in the family    Advantages and Disadvantages   There are advantages and disadvantages to genetic testing.    Advantages  May clarify your cancer risk  Can help you make medical decisions  May explain the cancers in your family  May give useful information to your family members (if you share your results)    Disadvantages  Possible negative emotional impact of learning about inherited cancer risk  Uncertainty in interpreting a negative test result in some situations  Possible genetic discrimination concerns (see below)    Genetic Information Nondiscrimination Act (EUN)  The Genetic Information Nondiscrimination Act of 2008 (EUN) is a federal law that protects individuals from health insurance or employment discrimination based on a genetic test result alone (with some exceptions, including employers with fewer than 15 employees, and ).  Although rare, EUN  does not cover discrimination protections in terms of life insurance, long term care, or disability insurances.  Visit the  National Human Genome Research [place]ile website to learn more.    Reducing Cancer Risk  All of the genes described in this handout have nationally recognized cancer screening guidelines that would be recommended for individuals who test positive.  In addition to increased cancer screening, surgeries may be offered or recommended to reduce cancer risk.  Recommendations are based upon an individual s genetic test result as well as their personal and family history of cancer.    Questions to Think About Regarding Genetic Testing:  What effect will the test result have on me and my relationship with my family members if I have an inherited gene mutation?  If I don t have a gene mutation?  Should I share my test results, and how will my family react to this news, which may also affect them?  Are my children ready to learn new information that may one day affect their own health?    Hereditary Cancer Resources    FORCE: Facing Our Risk of Cancer Empowered facingourrisk.org   Bright Pink bebrightpink.org   Li-Fraumeni Syndrome Association lfsassociation.org   PTEN World PTENworld.com   No stomach for cancer, Inc. nostomachforcancer.org   Stomach cancer relief network Scrnet.org   Collaborative Group of the Americas on Inherited Colorectal Cancer (CGA) cgaicc.com    Cancer Care cancercare.org   American Cancer Society (ACS) cancer.org   National Cancer Fertile (NCI) cancer.gov     Please call us if you have any questions or concerns.   Cancer Risk Management Program [phone] ([phone])  Dante Delgado, MS [facility]  [phone]  Sheeba Bravo, MS, [facility] [phone]  Inessa Cohen, MS, [facility]  [phone]  Lena Silverio, MS, [facility]  [phone]  Suzanne Leon, MS, [facility]  [phone]  Venus Santos, MS, [facility] [phone]  Cassandra Wilkins, MS, [facility] [phone]    References  Danny FinnP, Dipti S, Nancy ROCHA, Joceline JE, Coty JL, Perla N, Jeremiah H, Belle O, Patrick A, Gray B, Jena P, Ernie S, Braydon DM, Adolph N,  Waldo E, Jordan H, Angel Luis E, Telma J, Dalton J, Eze B, Tulinius H, Thorlacius S, Eerola H, Karen H, Lindy K, Yoav OP. Average risks of breast and ovarian cancer associated with BRCA1 or BRCA2 mutations detected in case series unselected for family history: a combined analysis of 222 studies. Am J Hum Kate. 2003;72:1117-30.  Moy N, Kim M, Silverio G.  BRCA1 and BRCA2 Hereditary Breast and Ovarian Cancer. Gene Reviews online. 2013.  Andrew YC, Taylor S, Reji G, White S. Breast cancer risk among male BRCA1 and BRCA2 mutation carriers. J Natl Cancer Inst. 2007;99:1811-4.  Bry TAVERAS, Johnny I, Yasmani MENCHACA, Frank E, Anahi ER, Manju F. Risk of breast cancer in male BRCA2 carriers. J Med Kate. 2010;47:710-1.  National Comprehensive Cancer Network. Clinical practice guidelines in oncology, colorectal cancer screening. Available online (registration required). 2015.  Perez MH, Lara J, Bbo J, Sharla LA, Orrea MS, Eng C. Lifetime cancer risks in individuals with germline PTEN mutations. Clin Cancer Res. 2012;18:400-7.  Diogenes BULLARD. Cowden Syndrome: A Critical Review of the Clinical Literature. J Kate . 2009:18:13-27.  Raúl A, Avi D, Erin S, Debra P, Jefei T, Kev M, Jamarcus B, Olesya H, John R, Magaly K, Viji L, Bry TAVERAS, Braydon D, Brendan DF, Marcel MR, The Breast Cancer Susceptibility Collaboration (UK) & Bimal N. SHALINI mutations that cause ataxia-telangiectasia are breast cancer susceptibility alleles. Nature Genetics. 2006;38:873-875  Elliott N , Ama Y, Demetra MENCHACA, Tere DOWELL, Ever GM , Cindy ML, Rose Marie S, Jernigan AG, Syngal S, Tiburcio ML, Dennis J , Jewel R, Sravan SZ, Alannah JR, Regla VE, Alla M, Vogelstein B, Coreen N, Anthony RH, Bobby KW, and Adis AP. SHALINI mutations in patients with hereditary pancreatic cancer. Cancer Discover. 2012;2:41-46  Patricia OBRIEN et al. Breast-Cancer Risk in Families with Mutations in PALB2.  NEJM. 2014; 371(6):497-506.  CHEK2 Breast Cancer Case-Control Consortium. CHEK2*1100delC and susceptibility to breast cancer: A collaborative analysis involving 10,860 breast cancer cases and 9,065 controls from 10 studies. Am J Hum Kate, 74 (2004), pp. 3358-1613  Hans T, Trevin S, Lian K, et al. Spectrum of Mutations in BRCA1, BRCA2, CHEK2, and TP53 in Families at High Risk of Breast Cancer. KOBE. 2006;295(12):8426-7550.   Gabo C, Karl D, Yamile A, et al. Risk of breast cancer in women with a CHEK2 mutation with and without a family history of breast cancer. J Clin Oncol. 2011;29:3628-6775.  Hugo H, David E, Katy SJ, et al. Contribution of germline mutations in the RAD51B, RAD51C, and RAD51D genes to ovarian cancer in the population. J Clin Oncol. 2015;33(26):6511-8110. Doi:10.1200/JCO.2015.61.2408.  Andre T, Mikal DF, Lea P, et al. Mutations in BRIP1 confer high risk of ovarian cancer. Marii Kate. 2011;43(11):7038-9878. doi:10.1038/ng.955.

## 2024-08-26 NOTE — NURSING NOTE
Chief Complaint   Patient presents with    Derm Problem     Excision-R distal forearm melanoma     Jaja GOODSON, RN  Dermatology Surgery  [phone]

## 2024-08-26 NOTE — NURSING NOTE
Current patient location: [address] [place] [id number]    Is the patient currently in the state of [place]? YES    Visit mode:Telephone    If the visit is dropped, the patient can be reconnected by: TELEPHONE VISIT: Phone number:   Telephone Information:   Mobile [phone]       Will anyone else be joining the visit? YES: How would they like to receive their invitation? Text to cell phone: NA  (If patient encounters technical issues they should call [phone]    How would you like to obtain your AVS? MyChart    Are changes needed to the allergy or medication list? N/A    Are refills needed on medications prescribed by this physician? NO    Rooming Documentation:  Questionnaire(s) not done per department protocol      Reason for visit: Consult    Abdi NEWBERRY

## 2024-08-26 NOTE — TELEPHONE ENCOUNTER
Follow up call attempted & left voicemail following excision procedure with Dr. Christian.       Are you having pain?   Are you taking pain medication?   Are you applying ice?    Have you had any noticeable bleeding through the bandage?    Do you have any other concerns?        Please call [phone] if you have any questions or concerns.    Jaja GOODSON RN  Dermatology Surgery  [phone]

## 2024-08-26 NOTE — PROGRESS NOTES
DERMATOLOGY EXCISION PROCEDURE NOTE    Dermatology Problem List:  1. 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 ISK right flank s/p cryotherapy  3. Malignant melanoma, R distal forearm, s/p shave bx 08/06/2024, s/p excision 8/26/24    NAME OF PROCEDURE: Excision intermediate layered linear closure  Staff surgeon: Cheo Christian MD  Fellow: Norberto Haynes MD  Scrub Nurse: none    PRE-OPERATIVE DIAGNOSIS:  Malignant Melanoma  POST-OPERATIVE DIAGNOSIS: Same   LOCATION: Right distal forearm  FINAL EXCISION SIZE(DEFECT SIZE): 2.5x3  cm  MARGIN: 1 cm  FINAL REPAIR LENGTH: 6.5 cm   ANESTHESIA: 6 1% lidocaine with 1:100,000 epinephrine    INDICATIONS: This patient presented with a 1x1 cm Malignant Melanoma. Excision was indicated. We discussed the principles of treatment and most likely complications including scarring, bleeding, infection, incomplete excision, wound dehiscence, pain, nerve damage, and recurrence. Informed consent was obtained and the patient underwent the procedure as follows:    PROCEDURE: The patient was taken to the operative suite. Time-out was performed.  The treatment area was anesthetized with 1% lidocaine with epinephrine. The area was prepped with Chlorhexidine and rinsed with sterile saline and draped with sterile towels. The lesion was delineated and excised down to subcutaneous fat in a elliptical manner. Hemostasis was obtained by electrocoagulation.     REPAIR: An intermediate layered linear closure was selected as the procedure which would maximally preserve both function and cosmesis.    After the excision of the tumor, the area was carefully undermined. Hemostasis was obtained with bipolar electrocoagulation.  Closure was oriented so that the wound was in  the patient's natural skin tension lines. The subcutaneous and dermal layers were then closed with 4-0 Monocryl sutures. The epidermis was then carefully approximated along the length of the wound using 5-0 Vicryl Rapide simple running sutures.     Estimated blood loss was less than 10 ml for all surgical sites. A sterile pressure dressing was applied and wound care instructions, with a written handout, were given. The patient was [facility] alert and ambulatory.    The patient elected for pathology results to automatically release and understands that the clinical staff will contact them as soon as possible to notify them of the results.    Dr. Cheo Christian was immediately available for the entire surgery and was physicially present for the key portions of the procedure.    Anatomic Pathology Results: pending    Clinical Follow-Up: PRN. Regular skin checks with general dermatology     Staff Involved:  Scribe/Staff/Fellow    ISAVITA, am serving as a scribe; to document services personally performed by Cheo Christian MD -based on data collection and the provider's statements to me.      Attending attestation:  I was present for key elements of the procedure and immediately available for all other portions of the procedure.  I have reviewed the note and edited it as necessary.    Cheo Christian M.D.  Professor  Director of Dermatologic Surgery  Department of Dermatology  [facility]    Dermatology Surgery Clinic  [facility] [address] [address]

## 2024-08-26 NOTE — PATIENT INSTRUCTIONS
Wound care    I will experience scar, bleeding, swelling, pain, crusting and redness. I may experience incomplete removal or recurrence. Risks are bleeding, bruising, swelling, infection, nerve damage, & a large wound. A second procedure may be recommended to obtain the best cosmetic or functional result.       A three month office visit with your Surgeon is recommended for scar evaluation. Please reach out sooner if you have concerns about you surgical site/wound.    Caring for your skin after surgery    After your surgery, a pressure bandage will be placed over the area that has stitches. This is important to prevent bleeding. Please follow these instructions over the next 1 to 2 weeks. Following this regimen will help to prevent complications as your wound heals.     For the first 48 hours after your surgery:    Leave the pressure dressing on and keep it dry. If it should come loose, you may re-tape it, but do not take it off.  Relax and take it easy. Do not do any vigorous exercise or heavy lifting. This could cause the wound to bleed.  Post-Operative pain is usually mild. If you are able to take tylenol, You may take plain or extra-strength Tylenol (acetaminophen) As directed on the bottle (do not take more than 4,000mg in one day). If you are able to take ibuprofen, you can alternate the tylenol and ibuprofen.   Avoid alcohol as this may increase your tendency to bleed.   You may put an ice pack around the bandaged area for 20 minutes at a time as needed. This may help reduce swelling, bruising, and pain. Make sure the ice pack is waterproof so that the pressure bandage doesn t get wet.  If the wound is on the face try to sleep with your head elevated. Either in a recliner or propped up in bed, this will decrease swelling around the eyes.   You may see a small amount of drainage or blood on your pressure bandage. This is normal. However:  If drainage or bleeding continues or saturates the bandage, you will  "need to apply firm pressure over the bandage with a piece of gauze for 15 minutes.  If bleeding continues after applying pressure for 15 minutes, apply an ice pack to the bandaged area for 15 minutes.  If bleeding still continues, call our office or go to the nearest emergency room.    Remove pressure dressing 48 hours after surgery:    Carefully remove the pressure bandage. If it seems sticky or too difficult to get off, you may need to soak it off in the shower.  After the pressure dressing is removed, you may shower and get the wound wet. However, Do Not let the forceful stream of the shower hit the wound directly.  Follow these wound care and dressing change instructions:    You have steri strips, skin glue (purplish/clear), and tegaderm (clear film) over your incision line, you can shower.   You may allow water to run over the site. Do not soak.  Do Not rub or scrub the site.  Pat dry after the shower or bath.  Avoid topical medications, lotions, creams, ointment,or oils.  Do not use tanning lamps or expose the site to sun.   Check wound appearance daily, some swelling and redness is normal after a procedure but should go away as your would is healing. If the swelling and redness or pain increases or if any other signs of infection occur listed below please send in a photo via my chart and or call us to let us know.  The clear film should start peeling off approximately around 2 weeks. By this time your wound should be sufficiently healed. If it still looks to be healing when the glue comes off you can clean the wound with soapy warm water daily in the shower and apply Vaseline to the incision line.   Once the clear film starts peeling off to the point where water is getting trapped under the clear film, please gently peel off.        If you are able to take acetaminophen (\"Tylenol,\" etc.) and ibuprofen (\"Advil\" or \"Motrin,\" etc.), then you may STAGGER these medications by taking 400 mg of ibuprofen (usually " two tabs) every 8 hours and 1,000 mg of acetaminophen (e.g., two tabs of extra-strength Tylenol) every 8 hours.    This means, for example, that you could take the followin,000 mg of Tylenol, followed 4 hours later by 400 mg Ibuprofen, followed 4 hours later with 1,000 mg of Tylenol, followed 4 hours later by 400 mg Ibuprofen, followed 4 hours later with 1,000 mg of Tylenol, and so forth.     Essentially, you can take either 1,000 mg of Tylenol or 400 mg of ibuprofen in alternating fashion EVERY FOUR HOURS.    Do NOT exceed more than 4,000 mg of Tylenol or 3,200 mg of ibuprofen per 24 hours. If you are not able to take Tylenol or ibuprofen as above due to other health issues (or a physician has told you directly that you are not allowed to take one of them, say due to pre-existing severe liver or kidney issues), then disregard the above directions.    Scientific evidence supports that this combination/schedule of pain medications is just as effective, if not more effective, than taking a narcotic pain medicine.       Follow up will be a 3 month scar evaluation either in person or via a telephone visit with you sending in a photo via [employer]. Unless you have been told to follow up sooner or if you have concerns and would like to be see sooner. Please call or send us in a [employer] message if possible and attach a photo.        What to expect:    The first couple of days your wound may be tender and may bleed slightly when doing wound care.  There may be swelling and bruising around the wound, especially if it is near the eyes. For your comfort, you may apply ice or cold compresses to the bruises after your have removed the pressure bandage.  The area around your wound may be numb for several weeks or even months.  You may experience periodic sharp pain or mild itching around the wound as it heals.   The suture line will look dark for a while but will lighten over time.       When to call us:    You have bleeding  that will not stop after applying pressure and ice.  You have pain that is not controlled with Tylenol (acetaminophen.)  You have signs or symptoms of an infection such as:  Fever over 100 degrees Fahrenheit  Redness, warmth or foul-smelling drainage from the wound  If you have any questions, or are not sure how to take care of the wound.    Phone numbers:    During business hours (M-F 8:00-4:30 p.m.)  [facility]-  [phone] Option 1 appt. Desk and ask for the Dermatology Surgery Team  [phone] [facility] .     ---------------------------------------------------------  Evenings/Weekends/Holidays  Hospital - [phone]   TTY for hearing atomwjlw-[phone]  *Ask  to page dermatologist on-call  Emergency Khhd-[phone]  TTY for hearing impaired- [phone]

## 2024-08-26 NOTE — LETTER
8/26/2024      Shanelle Sepulveda  [address] [address]      Dear Colleague,    Thank you for referring your patient, Shanelle Sepulveda, to the [facility]. Please see a copy of my visit note below.    Virtual Visit Details    Type of service:  Telephone Visit   Phone call duration: 49 minutes   Originating Location (pt. Location): Home  Distant Location (provider location):  Off-site    8/26/2024    Referring Provider: Aline Teague PA-C     Presenting Information:   I spoke with Shanelle Sepulveda over the phone today for genetic counseling to discuss her personal and family history of cancer. This appointment was conducted virtually due to COVID-19 precautions. We talked today to review this history, cancer screening recommendations, and available genetic testing options.    Personal History:  Shanelle is a 73 year old female. 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 Left lower back: Melanoma in situ.   9/5/23: Right upper arm: Malignant melanoma. Left upper arm: Malignant melanoma.    She will continue with skin exams every 3 months.     She had her first menstrual period at age 17, her first child at age 24, and is postmenopausal (age 55). Shanelle underwent hysterectomy and bilateral salpingo-oophorectomy on 4/18/06 due to fibroids. She reports that she has not used hormone replacement therapy or oral contraceptives. She has clinical breast exams and mammograms; her most recent mammogram on 1/22/24 was negative. She had Cologuard testing in 2023 which was negative. Her most recent colonoscopy on 6/16/16 detected no polyps and follow-up was recommended in 10 years. A previous colonoscopy in 2004 was normal. Shanelle reported no history of tobacco use and occasional alcohol use.    Family History: (Please  see scanned pedigree for detailed family history information)  Siblings:  Her sister is 79 years old and was diagnosed with breast cancer in her 70s. Treatment included radiation.   Another sister is in her 70s and was diagnosed with breast cancer in her 60s. Treatment included radiation and possibly chemotherapy.   A third sister is 72 years old and was diagnosed with melanoma in her 20s in the 1970s.  Maternal:  Her mother was diagnosed with pancreatic cancer at age 66 and passed away in her 60s. She had no known history of smoking or other exposures.   Shanelle is not aware of any other cancers in her maternal relatives, but reports that she has limited information about her cousins.   Paternal:  Her father was diagnosed with liver cancer in his early 80s and passed away at age 83. He had no known history of smoking or other exposures.   One aunt may have had a cancer in her 60s (type unknown to Shanelle) and passed away in approximately her mid 60s.     Shanelle is not aware of any genetic testing for inherited cancer risk in any of her family members.     Her maternal ethnicity is Maori. Her paternal ethnicity is Salvadorean, Polish. There is no known Ashkenazi Nondenominational ancestry on either side of her family.     Discussion:  Shanelle's personal and family history of cancer is suggestive of a hereditary cancer syndrome.  We reviewed the features of sporadic, familial, and hereditary cancers. In looking at Shanelle's family history, it is possible that a cancer susceptibility gene is present due to her personal history of four melanomas since September 2023 and her family history of melanoma, breast, and pancreatic cancer.  We discussed the natural history and genetics of hereditary cancer. Based on her personal and family history, we discussed genes in which mutations are associated with an increased risk for melanoma, pancreatic, and breast cancer, such as:  Familial Atypical Multiple Mole Melanoma Syndrome (FAMMM) is  caused by mutations in the CDKN2A gene. The lifetime risk for melanoma is between 28-76% (PMID 79567725, PMID 26584737). Some studies indicate that these risks may vary, due to different factors. Individuals with FAMMM typically have many moles (more than 50), which can be atypical. People with FAMMM have increased risks for pancreatic cancer, and possibly other cancers. The exact risk of pancreatic cancer can depend on a person s specific CDKN2A mutation, but has been reported as about 17% by age 75 by one larger study (PMID 48959884). The National Comprehensive Cancer Network (NCCN) states that the lifetime risk of pancreatic cancer is greater than 15%. We discussed screening for melanoma (frequent skin exams) and pancreatic cancer (endoscopic ultrasonography (EUS) and/or MRI/magnetic resonance).    Mutations in the BRCA1 and BRCA2 genes cause a condition known as Hereditary Breast and Ovarian Cancer syndrome (HBOC). Women with a mutation in either of these genes are at increased risk for breast and ovarian cancer. There is also an increased risk for a second primary breast cancer. Men with a mutation in either of these genes are at increased risk for breast and prostate cancer. Both women and men may also be at increased risk for pancreatic cancer and melanoma.   Detailed handouts regarding these genes and other genes in which mutations are associated with an increased risk for melanoma, breast, and pancreatic cancer will be provided to Shanelle via [employer] and can be found in the after visit summary. Topics included: inheritance pattern, cancer risks, cancer screening recommendations, and also risks, benefits and limitations of testing.   Based on her personal and family history, Shanelle meets current National Comprehensive Cancer Network (NCCN) criteria for genetic testing of melanoma susceptibility genes, as well as pancreatic and breast cancer susceptibility genes.    We discussed that there are additional  genes that could cause increased risk for melanoma, breast, pancreatic, liver, and other cancers. As many of these genes present with overlapping features in a family and accurate cancer risk cannot always be established based upon the pedigree analysis alone, it would be reasonable for Shanelle to consider panel genetic testing to analyze multiple genes at once.  We reviewed genetic testing options for Shanelle based on her personal and family history: a panel of genes associated with an increased risk for certain cancers, or larger panel options to include genes associated with increased risk for multiple different cancer types. She expressed an interest in more broad testing and opted for a Custom Panel (a combination of the Common Hereditary Cancers Panel + Hereditary Skin Cancer Panel, including preliminary-evidence genes for skin cancer).  Genetic testing is available for 48 genes associated with cancers of the breast, ovary, uterus, prostate and gastrointestinal system: [employer] Common Hereditary Cancers panel (APC, SHALINI, AXIN2, BAP1, BARD1, BMPR1A, BRCA1, BRCA2, BRIP1, CDH1, CDK4, CDKN2A, CHEK2, CTNNA1, DICER1, EPCAM, FH, GREM1, HOXB13, KIT, MBD4, MEN1, MLH1, MSH2, MSH3, MSH6, MUTYH, NF1, NTHL1, PALB2, PDGFRA, PMS2, POLD1, POLE, PTEN, RAD51C, RAD51D, SDHA, SDHB, SDHC, SDHD, SMAD4, SMARCA4, STK11, TP53, TSC1, TSC2, VHL).    We discussed that many of these genes are associated with specific hereditary cancer syndromes and published management guidelines: Hereditary Breast and Ovarian Cancer syndrome (BRCA1, BRCA2), Mcdermott syndrome (MLH1, MSH2, MSH6, PMS2, EPCAM), Familial Adenomatous Polyposis (APC), Hereditary Diffuse Gastric Cancer (CDH1), Familial Atypical Multiple Mole Melanoma syndrome (CDK4, CDKN2A), Multiple Endocrine Neoplasia type 1 (MEN1), Juvenile Polyposis syndrome (BMPR1A, SMAD4), Cowden syndrome (PTEN), Li Fraumeni syndrome (TP53), Hereditary Paraganglioma and Pheochromocytoma syndrome (SDHA, SDHB,  SDHC, SDHD), Peutz-Jeghers syndrome (STK11), MUTYH Associated Polyposis (MUTYH), Tuberous sclerosis complex (TSC1, TSC2), Von Hippel-Lindau disease (VHL), and Neurofibromatosis type 1 (NF1).   The SHALINI, AXIN2, BAP1, BRIP1, CHEK2, FH, GREM1, MBD4, MSH3, NTHL1, PALB2, POLD1, POLE, RAD51C, and RAD51D genes are associated with increased cancer risk and have published management guidelines for certain cancers.   The remaining genes (BARD1, CTNNA1, DICER1, HOXB13, KIT, PDGFRA, and SMARCA4) are associated with increased cancer risk and may allow us to make medical recommendations when mutations are identified.   As noted above, her testing will also include analysis of additional genes associated with an increased risk for melanoma.   Due to COVID-19 precautions consent was obtained over the phone/video today. Genetic testing via a Custom Panel (a combination of the Common Hereditary Cancers Panel + Hereditary Skin Cancer Panel, including preliminary-evidence genes for skin cancer) will be sent to [facility]. Shanelle opted to schedule a blood draw for testing. Turnaround time from date when sample is received at the lab: approximately 3-4 weeks.  Medical Management: For Shanelle, we reviewed that the information from genetic testing may determine:  additional cancer screening for which Shanelle may qualify (i.e. mammogram and breast MRI, pancreatic cancer screening, more frequent colonoscopies, more frequent dermatologic exams, etc.),  options for risk reducing surgeries Shanelle could consider (i.e. bilateral mastectomy, etc.),    and targeted chemotherapies if she were to develop certain cancers in the future (i.e. immunotherapy for individuals with Mcdermott syndrome, PARP inhibitors, etc.).   These recommendations will be discussed in detail once genetic testing is completed.     Plan:  1) Today Shanelle elected to proceed with genetic testing via a Custom Panel (a combination of the Common Hereditary  Cancers Panel + Hereditary Skin Cancer Panel, including preliminary-evidence genes for skin cancer) offered by [employer].  2) This information should be available in 4-5 weeks.  3) Shanelle will be scheduled for a virtual visit (phone or video) to discuss the results.    Suzanne Leon MS, [facility]  Licensed, Certified Genetic Counselor  Office: [phone]  Email: [email or web address]       Again, thank you for allowing me to participate in the care of your patient.        Sincerely,        Suzanne Leon, GC

## 2024-08-26 NOTE — LETTER
8/26/2024       RE: Shanelle Sepulveda  [address] [address]     Dear Colleague,    Thank you for referring your patient, Shanelle Sepulveda, to the [facility] [place] [facility]. Please see a copy of my visit note below.    DERMATOLOGY EXCISION PROCEDURE NOTE    Dermatology Problem List:  1. 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 ISK right flank s/p cryotherapy  3. Malignant melanoma, R distal forearm, s/p shave bx 08/06/2024, s/p excision 8/26/24    NAME OF PROCEDURE: Excision intermediate layered linear closure  Staff surgeon: Cheo Christian MD  Fellow: Norberto Haynes MD  Scrub Nurse: none    PRE-OPERATIVE DIAGNOSIS:  Malignant Melanoma  POST-OPERATIVE DIAGNOSIS: Same   LOCATION: Right distal forearm  FINAL EXCISION SIZE(DEFECT SIZE): 1x1 cm  MARGIN: 1 cm  FINAL REPAIR LENGTH: 2.5x3 cm   ANESTHESIA: 6 1% lidocaine with 1:100,000 epinephrine    INDICATIONS: This patient presented with a 1x1 cm Malignant Melanoma. Excision was indicated. We discussed the principles of treatment and most likely complications including scarring, bleeding, infection, incomplete excision, wound dehiscence, pain, nerve damage, and recurrence. Informed consent was obtained and the patient underwent the procedure as follows:    PROCEDURE: The patient was taken to the operative suite. Time-out was performed.  The treatment area was anesthetized with 1% lidocaine with epinephrine. The area was prepped with Chlorhexidine and rinsed with sterile saline and draped with sterile towels. The lesion was delineated and excised down to subcutaneous fat in a elliptical manner. Hemostasis was obtained by  electrocoagulation.     REPAIR: An intermediate layered linear closure was selected as the procedure which would maximally preserve both function and cosmesis.    After the excision of the tumor, the area was carefully undermined. Hemostasis was obtained with bipolar electrocoagulation.  Closure was oriented so that the wound was in the patient's natural skin tension lines. The subcutaneous and dermal layers were then closed with 4-0 Monocryl sutures. The epidermis was then carefully approximated along the length of the wound using 5-0 Vicryl Rapide simple running sutures.     Estimated blood loss was less than 10 ml for all surgical sites. A sterile pressure dressing was applied and wound care instructions, with a written handout, were given. The patient was discharged from the [facility] alert and ambulatory.    The patient elected for pathology results to automatically release and understands that the clinical staff will contact them as soon as possible to notify them of the results.    Dr. Cheo Christian was immediately available for the entire surgery and was physicially present for the key portions of the procedure.    Anatomic Pathology Results: pending    Clinical Follow-Up: PRN. Regular skin checks with general dermatology     Staff Involved:  Scribe/Staff/Fellow    I, SAVITA SANTOS, am serving as a scribe; to document services personally performed by Cheo Christian MD -based on data collection and the provider's statements to me.      Attending attestation:  I was present for key elements of the procedure and immediately available for all other portions of the procedure.  I have reviewed the note and edited it as necessary.    Cheo Christian M.D.  Professor  Director of Dermatologic Surgery  Department of Dermatology  [facility]    Dermatology Surgery Clinic  [facility] [place] [id number]        Again, thank you for  allowing me to participate in the care of your patient.      Sincerely,    Cheo Christian MD

## 2024-08-30 LAB
PATH REPORT.COMMENTS IMP SPEC: NORMAL
PATH REPORT.FINAL DX SPEC: NORMAL
PATH REPORT.GROSS SPEC: NORMAL
PATH REPORT.MICROSCOPIC SPEC OTHER STN: NORMAL
PATH REPORT.RELEVANT HX SPEC: NORMAL

## 2024-09-04 ENCOUNTER — LAB (OUTPATIENT)
Dept: LAB | Facility: CLINIC | Age: 74
End: 2024-09-04
Payer: COMMERCIAL

## 2024-09-04 DIAGNOSIS — C43.61 MALIGNANT MELANOMA OF RIGHT FOREARM (H): ICD-10-CM

## 2024-09-04 DIAGNOSIS — Z80.0 FAMILY HISTORY OF PANCREATIC CANCER: ICD-10-CM

## 2024-09-04 DIAGNOSIS — Z80.8 FAMILY HISTORY OF MELANOMA: ICD-10-CM

## 2024-09-04 DIAGNOSIS — Z80.3 FAMILY HISTORY OF MALIGNANT NEOPLASM OF BREAST: ICD-10-CM

## 2024-09-04 PROCEDURE — 36415 COLL VENOUS BLD VENIPUNCTURE: CPT

## 2024-09-06 LAB
Lab: NORMAL
PERFORMING LABORATORY: NORMAL
SPECIMEN STATUS: NORMAL
TEST NAME: NORMAL

## 2024-09-12 LAB
SCANNED LAB RESULT: ABNORMAL
TEST NAME: ABNORMAL

## 2024-09-19 ENCOUNTER — IMMUNIZATION (OUTPATIENT)
Dept: FAMILY MEDICINE | Facility: CLINIC | Age: 74
End: 2024-09-19
Payer: COMMERCIAL

## 2024-09-19 DIAGNOSIS — Z23 NEED FOR PROPHYLACTIC VACCINATION AND INOCULATION AGAINST INFLUENZA: Primary | ICD-10-CM

## 2024-09-19 PROCEDURE — G0008 ADMIN INFLUENZA VIRUS VAC: HCPCS

## 2024-09-19 PROCEDURE — 90662 IIV NO PRSV INCREASED AG IM: CPT

## 2024-09-26 ENCOUNTER — VIRTUAL VISIT (OUTPATIENT)
Dept: ONCOLOGY | Facility: CLINIC | Age: 74
End: 2024-09-26
Attending: GENETIC COUNSELOR, MS
Payer: COMMERCIAL

## 2024-09-26 DIAGNOSIS — Z80.8 FAMILY HISTORY OF MELANOMA: ICD-10-CM

## 2024-09-26 DIAGNOSIS — Z15.89 CHEK2 GENE MUTATION POSITIVE: Primary | ICD-10-CM

## 2024-09-26 DIAGNOSIS — Z80.3 FAMILY HISTORY OF MALIGNANT NEOPLASM OF BREAST: ICD-10-CM

## 2024-09-26 DIAGNOSIS — C43.61 MALIGNANT MELANOMA OF RIGHT FOREARM (H): ICD-10-CM

## 2024-09-26 DIAGNOSIS — Z80.0 FAMILY HISTORY OF PANCREATIC CANCER: ICD-10-CM

## 2024-09-26 PROCEDURE — 96040 HC GENETIC COUNSELING, EACH 30 MINUTES: CPT | Mod: TEL,95 | Performed by: GENETIC COUNSELOR, MS

## 2024-09-26 NOTE — NURSING NOTE
Current patient location: [address] [place] [id number]    Is the patient currently in the state of [place]? YES    Visit mode:TELEPHONE    If the visit is dropped, the patient can be reconnected by: TELEPHONE VISIT: Phone number:   Telephone Information:   Mobile [phone]       Will anyone else be joining the visit? NO  (If patient encounters technical issues they should call [phone]    How would you like to obtain your AVS? MyChart    Are changes needed to the allergy or medication list? N/A    Are refills needed on medications prescribed by this physician? NO    Reason for visit: ALDO NEWBERRY

## 2024-09-26 NOTE — LETTER
9/26/2024      Shanelle Sepulveda  [address] [facility] [address]      Dear Colleague,    Thank you for referring your patient, Shanelle Sepulveda, to the [facility]. Please see a copy of my visit note below.    Virtual Visit Details    Type of service:  Telephone Visit   Phone call duration: 27 minutes   Originating Location (pt. Location): Home  Distant Location (provider location):  Off-site    9/26/2024    Referring Provider: Aline Teague PA-C     Presenting Information:   I spoke with Shanelle over the phone to discuss her genetic testing results. Her blood was drawn on 9/4/24. A Custom Panel (a combination of the Common Hereditary Cancers Panel + Hereditary Skin Cancer Panel, including preliminary-evidence genes for skin cancer) was ordered from [email or web address]. This testing was done because of her personal and family history of cancer.     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 Specifically her mutation is called c.470T>C (p.[id number]). We discussed that this mutation is associated with a slightly increased risk of breast cancer, prostate cancer, and possibly other cancers. We discussed the impact of this testing on Shanelle in detail.     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 This variant is called c.517A>G (p.[id number]). Given the uncertain significance of this result, medical management decisions should NOT be made based on this test result alone.    VUS Interpretation:  We discussed several different interpretations of this inconclusive test result. It is not clear if this variant in the MSH3 gene is associated with increased cancer risk.  1. This variant may be a benign change that does not increase cancer risk.  2. This variant may be a harmful mutation that causes an increased risk for  "cancer.  We discussed that known pathogenic (harmful) mutations in the MSH3 gene are associated with autosomal recessive MSH3-associated polyposis. In rare situations in which both parents have a harmful mutation in the MSH3 gene, their children each have a 25% risk for MSH3-associated polyposis. If this variant is later classified as harmful, Shanelle would be considered a carrier for MSH3-associated polyposis.     The laboratory is working to determine if this variant is harmful or benign, and they will contact me if it is reclassified. If this variant is determined to be a benign change, there may be a different gene or combination of genes and environment that are associated with the cancers in Shanelle and/or her relatives.      It is also important to consider that her relatives may have a mutation in one of the genes tested and she did not inherit it.      Shanelle's relatives may also carry this VUS. Because it is unclear what, if any, risk is associated with this variant, clinical genetic testing for this MSH3 variant alone is not recommended for relatives.    Of note, Shanelle tested negative for mutations or variants of uncertain significance in the following genes by sequencing and deletion/duplication analysis: APC, SHALINI, AXIN2, BAP1, BARD1, BLM, BMPR1A, BRCA1, BRCA2, BRIP1, CDH1, CDK4, CDKN2A, CTNNA1, DICER1, EPCAM, FH, FLCN, GREM1, HOXB13, KIT, MBD4, MC1R, MEN1, MITF, MLH1, MSH2, MSH6, MUTYH, NF1, NTHL1, PALB2, PDGFRA, PMS2, POLD1, POLE, POT1, PTCH1, PTCH2, PTEN, RAD51C, RAD51D, RB1, SDHA, SDHB, SDHC, SDHD, SMAD4, SMARCA4, STK11, SUFU, TERT, TP53, TSC1, TSC2, VHL, WRN. We reviewed the autosomal dominant inheritance of these genes. Shanelle cannot pass on a mutation in any of these genes to her children based on this test result. Mutations in these genes do not skip generations.      A copy of the test report can be found in the Laboratory tab, dated 9/4/24, and named \"LABORATORY MISCELLANEOUS RESULT.\" " The report is scanned in as a linked document.    Cancer Risks:    Mutations in the CHEK2 gene are associated with a moderate risk of breast cancer.  We discussed that this I157T mutation confers a lower breast cancer risk than other mutations in the CHEK2 gene.   The lifetime breast cancer risk for women who carry this specific CHEK2 mutation is approximately 1.5x higher than the general population lifetime risk for breast cancer of about 12%. This equates to a lifetime risk of about 18%. Other mutations in the CHEK2 gene cause about a 23-27% lifetime risk for breast cancer.   It was previously thought that CHEK2 mutations were associated with an increased risk for colorectal cancer, however, new data suggests that individuals with a CHEK2 mutation are not expected to be at an increased risk for colorectal cancer.  There is emerging evidence for increased risk for prostate cancer in individuals with CHEK2 mutations.   There is also a possible association of CHEK2 mutations with increased risk for other cancers, such as melanoma, thyroid, and other cancers. However data is still limited in this area. No confirmed risk numbers are available for these additional cancers, though they have been reported in families that have a CHEK2 mutation.    We discussed that the CHEK2 gene is currently considered a moderate-risk gene. This means that mutations in this gene increase the risk for certain cancers, but are unlikely to be the single cause for an individual's cancer. There are likely other genetic and/or environmental risk factors that, in combination with a CHEK2 gene mutation, cause cancer.    Cancer Screening and Prevention:  The following screening is recommended for individuals who have a mutation in the CHEK2 gene, per current National Comprehensive Cancer Network (NCCN) guidelines.  Typically, women with CHEK2 mutations qualify for high risk breast screening. However, as this mutation causes a lower risk than  other mutations in the CHEK2 gene, NCCN states that patients should be managed per their specific gene mutation. As this mutation is associated with a breast cancer risk of <20%, Shanelle would not qualify for high-risk breast screening based on this genetic test result alone.  Shanelle's lifetime breast cancer risk was calculated using the WOO Risk Evaluation v8 model to determine if she qualifies for high-risk screening based on family history. Per this model, Shanelle has an estimated 6.2% lifetime risk of developing breast cancer. Therefore, based on her test results and family history, Shanelle does not qualify for high risk breast screening and should continue with routine breast screening under the care of her physicians. Breast cancer screening is generally recommended to begin approximately 10 years younger than the earliest age of breast cancer diagnosis in the family, or at age 40, whichever comes first.   General population screening for colorectal cancer is recommended for individuals with a CHEK2 mutation. Increased screening may be recommended based on family history.  Current NCCN guidelines suggest prostate screening, including consideration of shared decision-making about screening annually at age 40.    There are currently no other specific cancer screening guidelines for other cancers potentially associated with a CHEK2 mutation. As such, additional screening should be based on family history.    Other screening based on Shanelle's personal and family history:  She should continue with her melanoma care and screening as recommended by dermatology.  We discussed her mother's history of pancreatic cancer. There are currently no screening guidelines for individuals with one relative with pancreatic cancer in the absence of an inherited gene mutation. However, we discussed that pancreatic cancer screening guidelines may change in the future, therefore Shanelle should continue to discuss current  screening recommendations with her physicians.    Other population cancer screening options, such as those recommended by the American Cancer Society and the National Comprehensive Cancer Network (NCCN), are also appropriate for Shanelle and her family. These screening recommendations may change if there are changes to Shanelle's personal and/or family history of cancer. Final screening recommendations should be made in consultation with each individual's primary care provider.     Of note, the above information is based on our current understanding of Shanelle's genetic findings. Shanelle is encouraged to reach out to me regularly regarding any pertinent updates to her personal and/or family history of cancer, or to review current cancer risk estimates and screening recommendations, as our understanding of the genetic findings in her family may change over time.      Implications for Family Members:  We reviewed that mutations in the CHEK2 gene are inherited in an autosomal dominant pattern. This means that each of Shanelle's children have a 50% chance of inheriting the same mutation. Likewise, each of her siblings has a 50% chance of having the same mutation. Extended relatives may also carry this mutation, and she is encouraged to share this information with her family members on both sides of the family. I am happy to help her relatives connect with a genetic counselor in their area if they would like to discuss testing.    Even though Shanelle's genetic testing result was positive, other relatives may carry a different gene mutation associated with an increased risk for cancer. Genetic counseling is recommended for her children, siblings, and extended maternal and paternal relatives to discuss genetic testing options. If any of her relatives do pursue genetic testing, Shanelle is encouraged to contact me so that we may review the impact of their test results on her.    Plan:  1.  Shanelle has access to a copy  "of her test results via [employer].  2.  I will provide a \"Dear Relative\" letter for Shanelle to share with her family members.  3.  She plans to follow up with her physicians.    If Shanelle has additional questions, I encouraged her to contact me directly at [phone].     Suzanne Leon MS, [facility] [place]  Licensed, Certified Genetic Counselor  Office: [phone]  Email: [email or web address]       Again, thank you for allowing me to participate in the care of your patient.        Sincerely,        Suzanne Leon, GC              Dear Relative,     The purpose of this letter is to inform you that your relative recently underwent genetic counseling and genetic testing due to the family history of cancer. The testing done through [employer] identified a low penetrance mutation in the CHEK2 gene. Specifically, the mutation is called c.470T>C (p.[id number]). The accession number linked to your relative's test report is: [id number].    A mutation (or change in the genetic code) causes a specific gene to stop working properly. Ultimately, individuals who have a mutation in the CHEK2 gene have an increased risk for breast, prostate, and other cancers. This particular CHEK2 mutation is considered a  low penetrance  mutation because other mutations in the CHEK2 gene cause higher cancer risks than this mutation.       This particular mutation increases the lifetime risk for female breast cancer by about 1.5 fold. The population lifetime risk for breast cancer in women is around 12%. Mutations in this gene have also been associated with an increased risk for other cancers ; however the exact risks are not well defined at this time.     If individuals are found to have a mutation in the CHEK2 gene, we would discuss increased cancer screening at earlier ages. It is important to note that both men and women have a 50% chance of passing a mutation on to each of their children.    I understand that this may be surprising, " unexpected, and even scary news. Because this mutation has been identified in your family, you are at risk for having the same mutation. As mentioned earlier, your children may also be at risk.     Scheduling a genetic counseling appointment does not mean you have to undergo genetic testing. The decision to pursue such testing is a very personal one that is discussed in more detail during the session. Much of cancer genetic counseling is providing information to individuals who are impacted by genetic information such as this.     If you are interested in scheduling a genetic counseling appointment at [facility], please call [phone] to schedule an appointment. You can also find a genetic counselor close to you or at another health system at [employer]    Sincerely,     Suzanne Leon MS, [facility] [place]  Licensed, Certified Genetic Counselor  Office: [phone]  Email: [email or web address]

## 2024-09-26 NOTE — PROGRESS NOTES
Virtual Visit Details    Type of service:  Telephone Visit   Phone call duration: 27 minutes   Originating Location (pt. Location): Home  Distant Location (provider location):  Off-site    9/26/2024    Referring Provider: Aline Teague PA-C     Presenting Information:   I spoke with Shanelle over the phone to discuss her genetic testing results. Her blood was drawn on 9/4/24. A Custom Panel (a combination of the Common Hereditary Cancers Panel + Hereditary Skin Cancer Panel, including preliminary-evidence genes for skin cancer) was ordered from [employer]. This testing was done because of her personal and family history of cancer.     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 Specifically her mutation is called c.470T>C (p.Ili429Icl). We discussed that this mutation is associated with a slightly increased risk of breast cancer, prostate cancer, and possibly other cancers. We discussed the impact of this testing on Shanelle in detail.     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 This variant is called c.517A>G (p.[id number]). Given the uncertain significance of this result, medical management decisions should NOT be made based on this test result alone.    VUS Interpretation:  We discussed several different interpretations of this inconclusive test result. It is not clear if this variant in the MSH3 gene is associated with increased cancer risk.  1. This variant may be a benign change that does not increase cancer risk.  2. This variant may be a harmful mutation that causes an increased risk for cancer.  We discussed that known pathogenic (harmful) mutations in the MSH3 gene are associated with autosomal recessive MSH3-associated polyposis. In rare situations in which both parents have a harmful mutation in the MSH3 gene, their children  "each have a 25% risk for MSH3-associated polyposis. If this variant is later classified as harmful, Shanelle would be considered a carrier for MSH3-associated polyposis.     The laboratory is working to determine if this variant is harmful or benign, and they will contact me if it is reclassified. If this variant is determined to be a benign change, there may be a different gene or combination of genes and environment that are associated with the cancers in Shanelle and/or her relatives.      It is also important to consider that her relatives may have a mutation in one of the genes tested and she did not inherit it.      Shanelle's relatives may also carry this VUS. Because it is unclear what, if any, risk is associated with this variant, clinical genetic testing for this MSH3 variant alone is not recommended for relatives.    Of note, Shanelle tested negative for mutations or variants of uncertain significance in the following genes by sequencing and deletion/duplication analysis: APC, SHALINI, AXIN2, BAP1, BARD1, BLM, BMPR1A, BRCA1, BRCA2, BRIP1, CDH1, CDK4, CDKN2A, CTNNA1, DICER1, EPCAM, FH, FLCN, GREM1, HOXB13, KIT, MBD4, MC1R, MEN1, MITF, MLH1, MSH2, MSH6, MUTYH, NF1, NTHL1, PALB2, PDGFRA, PMS2, POLD1, POLE, POT1, PTCH1, PTCH2, PTEN, RAD51C, RAD51D, RB1, SDHA, SDHB, SDHC, SDHD, SMAD4, SMARCA4, STK11, SUFU, TERT, TP53, TSC1, TSC2, VHL, WRN. We reviewed the autosomal dominant inheritance of these genes. Shanelle cannot pass on a mutation in any of these genes to her children based on this test result. Mutations in these genes do not skip generations.      A copy of the test report can be found in the Laboratory tab, dated 9/4/24, and named \"LABORATORY MISCELLANEOUS RESULT.\" The report is scanned in as a linked document.    Cancer Risks:    Mutations in the CHEK2 gene are associated with a moderate risk of breast cancer.  We discussed that this I157T mutation confers a lower breast cancer risk than other mutations in " the CHEK2 gene.   The lifetime breast cancer risk for women who carry this specific CHEK2 mutation is approximately 1.5x higher than the general population lifetime risk for breast cancer of about 12%. This equates to a lifetime risk of about 18%. Other mutations in the CHEK2 gene cause about a 23-27% lifetime risk for breast cancer.   It was previously thought that CHEK2 mutations were associated with an increased risk for colorectal cancer, however, new data suggests that individuals with a CHEK2 mutation are not expected to be at an increased risk for colorectal cancer.  There is emerging evidence for increased risk for prostate cancer in individuals with CHEK2 mutations.   There is also a possible association of CHEK2 mutations with increased risk for other cancers, such as melanoma, thyroid, and other cancers. However data is still limited in this area. No confirmed risk numbers are available for these additional cancers, though they have been reported in families that have a CHEK2 mutation.    We discussed that the CHEK2 gene is currently considered a moderate-risk gene. This means that mutations in this gene increase the risk for certain cancers, but are unlikely to be the single cause for an individual's cancer. There are likely other genetic and/or environmental risk factors that, in combination with a CHEK2 gene mutation, cause cancer.    Cancer Screening and Prevention:  The following screening is recommended for individuals who have a mutation in the CHEK2 gene, per current National Comprehensive Cancer Network (NCCN) guidelines.  Typically, women with CHEK2 mutations qualify for high risk breast screening. However, as this mutation causes a lower risk than other mutations in the CHEK2 gene, NCCN states that patients should be managed per their specific gene mutation. As this mutation is associated with a breast cancer risk of <20%, Shanelle would not qualify for high-risk breast screening based on this  genetic test result alone.  Shanelle's lifetime breast cancer risk was calculated using the WOO Risk Evaluation v8 model to determine if she qualifies for high-risk screening based on family history. Per this model, Shanelle has an estimated 6.2% lifetime risk of developing breast cancer. Therefore, based on her test results and family history, Shanelle does not qualify for high risk breast screening and should continue with routine breast screening under the care of her physicians. Breast cancer screening is generally recommended to begin approximately 10 years younger than the earliest age of breast cancer diagnosis in the family, or at age 40, whichever comes first.   General population screening for colorectal cancer is recommended for individuals with a CHEK2 mutation. Increased screening may be recommended based on family history.  Current NCCN guidelines suggest prostate screening, including consideration of shared decision-making about screening annually at age 40.    There are currently no other specific cancer screening guidelines for other cancers potentially associated with a CHEK2 mutation. As such, additional screening should be based on family history.    Other screening based on Shanelle's personal and family history:  She should continue with her melanoma care and screening as recommended by dermatology.  We discussed her mother's history of pancreatic cancer. There are currently no screening guidelines for individuals with one relative with pancreatic cancer in the absence of an inherited gene mutation. However, we discussed that pancreatic cancer screening guidelines may change in the future, therefore Shanelle should continue to discuss current screening recommendations with her physicians.    Other population cancer screening options, such as those recommended by the American Cancer Society and the National Comprehensive Cancer Network (NCCN), are also appropriate for Shanelle and her family.  "These screening recommendations may change if there are changes to Shanelle's personal and/or family history of cancer. Final screening recommendations should be made in consultation with each individual's primary care provider.     Of note, the above information is based on our current understanding of Shanelle's genetic findings. Shanelle is encouraged to reach out to me regularly regarding any pertinent updates to her personal and/or family history of cancer, or to review current cancer risk estimates and screening recommendations, as our understanding of the genetic findings in her family may change over time.      Implications for Family Members:  We reviewed that mutations in the CHEK2 gene are inherited in an autosomal dominant pattern. This means that each of Shanelle's children have a 50% chance of inheriting the same mutation. Likewise, each of her siblings has a 50% chance of having the same mutation. Extended relatives may also carry this mutation, and she is encouraged to share this information with her family members on both sides of the family. I am happy to help her relatives connect with a genetic counselor in their area if they would like to discuss testing.    Even though Shanelle's genetic testing result was positive, other relatives may carry a different gene mutation associated with an increased risk for cancer. Genetic counseling is recommended for her children, siblings, and extended maternal and paternal relatives to discuss genetic testing options. If any of her relatives do pursue genetic testing, Shanelle is encouraged to contact me so that we may review the impact of their test results on her.    Plan:  1.  Shanelle has access to a copy of her test results via [employer].  2.  I will provide a \"Dear Relative\" letter for Shanelle to share with her family members.  3.  She plans to follow up with her physicians.    If Shanelle has additional questions, I encouraged her to contact me directly " at [phone].     Suzanne Leon MS, [facility] [place]  Licensed, Certified Genetic Counselor  Office: [phone]  Email: [email or web address]

## 2024-09-29 PROBLEM — Z15.89 CHEK2 GENE MUTATION POSITIVE: Status: ACTIVE | Noted: 2024-09-29

## 2024-09-29 NOTE — PATIENT INSTRUCTIONS
Dear Relative,     The purpose of this letter is to inform you that your relative recently underwent genetic counseling and genetic testing due to the family history of cancer. 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 Specifically, the mutation is called c.470T>C ([id number].[id number]). The accession number linked to your relative's test report is: [id number].    A mutation (or change in the genetic code) causes a specific gene to stop working properly. Ultimately, individuals who have a mutation in the CHEK2 gene have an increased risk for breast, prostate, and other cancers. This particular CHEK2 mutation is considered a  low penetrance  mutation because other mutations in the CHEK2 gene cause higher cancer risks than this mutation.       This particular mutation increases the lifetime risk for female breast cancer by about 1.5 fold. The population lifetime risk for breast cancer in women is around 12%. Mutations in this gene have also been associated with an increased risk for other cancers ; however the exact risks are not well defined at this time.     If individuals are found to have a mutation in the CHEK2 gene, we would discuss increased cancer screening at earlier ages. It is important to note that both men and women have a 50% chance of passing a mutation on to each of their children.    I understand that this may be surprising, unexpected, and even scary news. Because this mutation has been identified in your family, you are at risk for having the same mutation. As mentioned earlier, your children may also be at risk.     Scheduling a genetic counseling appointment does not mean you have to undergo genetic testing. The decision to pursue such testing is a very personal one that is discussed in more detail during the session. Much of cancer genetic counseling is providing information to individuals who are impacted by genetic information such as this.     If  you are interested in [facility], please call [phone] to schedule an appointment. You can also find a genetic counselor close to you or at another health system at [employer]    Sincerely,     Suzanne Leon MS, [facility]  Licensed, Certified Genetic Counselor  Office: [phone]  Email: [email or web address]

## 2024-10-08 ENCOUNTER — MYC MEDICAL ADVICE (OUTPATIENT)
Dept: INTERNAL MEDICINE | Facility: CLINIC | Age: 74
End: 2024-10-08
Payer: COMMERCIAL

## 2024-11-07 ENCOUNTER — OFFICE VISIT (OUTPATIENT)
Dept: DERMATOLOGY | Facility: CLINIC | Age: 74
End: 2024-11-07
Payer: COMMERCIAL

## 2024-11-07 DIAGNOSIS — L57.0 ACTINIC KERATOSIS: ICD-10-CM

## 2024-11-07 DIAGNOSIS — D22.9 MULTIPLE BENIGN NEVI: ICD-10-CM

## 2024-11-07 DIAGNOSIS — L81.4 SOLAR LENTIGO: ICD-10-CM

## 2024-11-07 DIAGNOSIS — D18.01 CHERRY ANGIOMA: ICD-10-CM

## 2024-11-07 DIAGNOSIS — Z85.820 HX OF MALIGNANT MELANOMA: Primary | ICD-10-CM

## 2024-11-07 DIAGNOSIS — L82.1 SEBORRHEIC KERATOSES: ICD-10-CM

## 2024-11-07 DIAGNOSIS — Z86.018 HISTORY OF DYSPLASTIC NEVUS: ICD-10-CM

## 2024-11-07 PROCEDURE — 17000 DESTRUCT PREMALG LESION: CPT | Performed by: PHYSICIAN ASSISTANT

## 2024-11-07 PROCEDURE — 99214 OFFICE O/P EST MOD 30 MIN: CPT | Mod: 25 | Performed by: PHYSICIAN ASSISTANT

## 2024-11-07 NOTE — LETTER
11/7/2024      Shanelle Sepulveda  [address] [address]      Dear Colleague,    Thank you for referring your patient, Shanelle Sepulveda, to the [facility] [place]. Please see a copy of my visit note below.    [facility] Dermatology Note  Encounter Date: Nov 7, 2024  Office Visit      Dermatology Problem List:  FBSE 11/7/24  1. 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 -Excised 3/20/2024  - MM - L upper arm (T1a) and right upper arm (T2b), WLE and SNL bx 10/16/23  # History of DN   - moderate to severe atypia - L lower back, excision 3/20/2024  # AK - cryo    FHx: pancreatic cancer - mother  PMHx: CHEK2 mutation positive  ____________________________________________    Assessment & Plan:  # Hx melanoma x3  - edu on implications of CHEK2 mutation - increased risk unknown as data is limited as to how much of an increased risk for melanoma she is at, reviewed genetic testing encounter patient had with oncology   - ABCDEs: Counseled ABCDEs of melanoma: Asymmetry, Border (irregularity), Color (not uniform, changes in color), Diameter (greater than 6 mm which is about the size of a pencil eraser), and Evolving (any changes in preexisting moles).  - Sun protection: Counseled SPF30+ sunscreen, UPF clothing, sun avoidance, tanning bed avoidance.   - Recommended yearly dental, ophthalmology, and gynecology exams.  - Recommended skin exams for all first-degree relatives.  - Recommended follow up is 3 months    # Hx of DN  - Signs and Symptoms of non-melanoma skin cancer and ABCDEs of melanoma reviewed with patient. Patient encouraged to perform monthly self skin exams and educated on how to perform them. UV precautions reviewed with patient. Patient was asked  about new or changing moles/lesions on body.   - Sunscreen: Apply 20 minutes prior to going outdoors and reapply every two hours, when wet or sweating. We recommend using an SPF 30 or higher, and to use one that is water resistant.       # Ak - L eyebrow x1  - cryo  - edu on etiology    # Benign findings: multiple benign nevi, lentigines, cherry angiomas, SKs  - edu on benign etiology  - Signs and Symptoms of non-melanoma skin cancer and ABCDEs of melanoma reviewed with patient. Patient encouraged to perform monthly self skin exams and educated on how to perform them. UV precautions reviewed with patient. Patient was asked about new or changing moles/lesions on body.   - Sunscreen: Apply 20 minutes prior to going outdoors and reapply every two hours, when wet or sweating. We recommend using an SPF 30 or higher, and to use one that is water resistant.     - RTC for changes    Procedures Performed:   - Cryotherapy procedure note, location(s): L eyebrow. After verbal consent and discussion of risks and benefits including, but not limited to, dyspigmentation/scar, blister, and pain, 1 lesion(s) was(were) treated with 1-2 mm freeze border for 1-2 cycles with liquid nitrogen. Post cryotherapy instructions were provided.     Follow-up: 3 month(s) in-person, or earlier for new or changing lesions    Staff:     All risks, benefits and alternatives were discussed with patient.  Patient is in agreement and understands the assessment and plan.  All questions were answered.    Livia Zamorano PA-C, MPAS  [facility]: Phone: [phone], Fax: [phone]  [facility]: Phone: [phone],  Fax: [phone]  [facility]: Phone: [phone], Fax: [phone]  ____________________________________________    CC: Skin Check (3 month return FBSC/Concerns: None)      Reviewed patients past medical history and pertinent chart  review prior to patient's visit today.     HPI:  Ms. Shanelle Sepulveda is a 73 year old female who presents today as a return patient for FBSE. Hx of MM x3 as well as DN. Had genetic testing done since last visit with dermatology. Has no new lesions of concern.     Patient is otherwise feeling well, without additional concerns.    Labs:  none    Physical Exam:  Vitals: LMP 10/24/2005   SKIN: Full skin, which includes the head/face, both arms, chest, back, abdomen,both legs, genitalia and/or groin buttocks, digits and/or nails, was examined.   - Napoles's skin type I-II, <100 nevi  - There are dome shaped bright red papules on the trunk.   - Multiple regular brown pigmented macules and papules are identified on the trunk and extremities.   - Scattered brown macules on sun exposed areas.  - There are waxy stuck on tan to brown papules on the trunk.   - Gritty pink macule near left eyebrow x1  -There are well healed surgical scars without erythema, nodularity or telangiectasias on the sites of previous melanomas  - No other lesions of concern on areas examined.   LYMPH: Examination of the pre/post auricular, occipital, cervical, clavicular, axillary and inguinal lymph nodes was negative.    Medications:  Current Outpatient Medications   Medication Sig Dispense Refill     ASPIRIN PO Take 81 mg by mouth daily       Cholecalciferol (VITAMIN D3) 50 MCG (2000 UT) CAPS        fish oil-omega-3 fatty acids 1000 MG capsule Take 1 capsule (1 g) by mouth 3 times daily 120 capsule 11     lisinopril (ZESTRIL) 40 MG tablet Take 1 tablet (40 mg) by mouth daily 90 tablet 3     metoprolol succinate ER (TOPROL XL) 200 MG 24 hr tablet Take 1 tablet (200 mg) by mouth daily 90 tablet 3     Multiple Vitamins-Minerals (MULTIVITAL PO) Take  by mouth.       pravastatin (PRAVACHOL) 20 MG tablet Take 1 tablet (20 mg) by mouth daily 90 tablet 3     venlafaxine (EFFEXOR XR) 150 MG 24 hr capsule TAKE 1 CAPSULE EVERY DAY 90 capsule 3      ketorolac (ACULAR) 0.5 % ophthalmic solution        moxifloxacin (VIGAMOX) 0.5 % ophthalmic solution        prednisoLONE acetate (PRED FORTE) 1 % ophthalmic suspension        No current facility-administered medications for this visit.      Past Medical/Surgical History:   Patient Active Problem List   Diagnosis     Generalized anxiety disorder     Overactive bladder     Atrophic vaginitis     Hyperlipidemia with target LDL less than 130     HTN, goal below 140/90     Urgency-frequency syndrome     Family history of malignant neoplasm of breast     Cervical cancer screening     Major depression in complete remission (H)     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, including shoulder, left (H)     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, including shoulder, right (H)     Class 2 severe obesity due to excess calories with serious comorbidity in adult (H)     CHEK2 gene mutation positive     Past Medical History:   Diagnosis Date     Intramural leiomyoma of uterus      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, including shoulder, left (H) 9/21/2023     MENOPAUSAL DISORDER NOS 8/10/2005    Menses getting much heavier, pelvic sono 02/06--2 uterine fibroids seen, 1.5 and 2.7cm each; simple left ovarian cyst 4.2 cm, 2.6 cm complex right ovarian cyst--endometrial biopsy benign     Symptomatic menopausal or female climacteric states                         Again, thank you for allowing me to participate in the care of your patient.        Sincerely,        Livia Zamorano PA-C

## 2024-11-07 NOTE — PATIENT INSTRUCTIONS
Proper skin care from [facility]:    -Eliminate harsh soaps as they strip the natural oils from the skin, often resulting in dry itchy skin ( i.e. Dial, Zest, Mong[place]ian Spring)  -Use mild soaps such as Cetaphil or Dove Sensitive Skin in the shower. You do not need to use soap on arms, legs, and trunk every time you shower unless visibly soiled.   -Avoid hot or cold showers.  -After showering, lightly dry off and apply moisturizing within 2-3 minutes. This will help trap moisture in the skin.   -Aggressive use of a moisturizer at least 1-2 times a day to the entire body (including -Vanicream, Cetaphil, Aquaphor or Cerave) and moisturize hands after every washing.  -We recommend using moisturizers that come in a tub that needs to be scooped out, not a pump. This has more of an oil base. It will hold moisture in your skin much better than a water base moisturizer. The above recommended are non-pore clogging.      Wear a sunscreen with at least SPF 30 on your face, ears, neck and V of the chest daily. Wear sunscreen on other areas of the body if those areas are exposed to the sun throughout the day. Sunscreens can contain physical and/or chemical blockers. Physical blockers are less likely to clog pores, these include zinc oxide and titanium dioxide. Reapply every two hour and after swimming.     Sunscreen examples: https://www.ewg.org/sunscreen/    UV radiation  UVA radiation remains constant throughout the day and throughout the year. It is a longer wavelength than UVB and therefore penetrates deeper into the skin leading to immediate and delayed tanning, photoaging, and skin cancer. 70-80% of UVA and UVB radiation occurs between the hours of 10am-2pm.  UVB radiation  UVB radiation causes the most harmful effects and is more significant during the summer months. However, snow and ice can reflect UVB radiation leading to skin damage during the winter months as well. UVB radiation is responsible for tanning,  burning, inflammation, delayed erythema (pinkness), pigmentation (brown spots), and skin cancer.     I recommend self monthly full body exams and yearly full body exams with a dermatology provider. If you develop a new or changing lesion please follow up for examination. Most skin cancers are pink and scaly or pink and pearly. However, we do see blue/brown/black skin cancers.  Consider the ABCDEs of melanoma when giving yourself your monthly full body exam ( don't forget the groin, buttocks, feet, toes, etc). A-asymmetry, B-borders, C-color, D-diameter, E-elevation or evolving. If you see any of these changes please follow up in clinic. If you cannot see your back I recommend purchasing a hand held mirror to use with a larger wall mirror.       Checking for Skin Cancer  You can find cancer early by checking your skin each month. There are 3 kinds of skin cancer. They are melanoma, basal cell carcinoma, and squamous cell carcinoma. Doing monthly skin checks is the best way to find new marks or skin changes. Follow the instructions below for checking your skin.   The ABCDEs of checking moles for melanoma   Check your moles or growths for signs of melanoma using ABCDE:   Asymmetry: the sides of the mole or growth don t match  Border: the edges are ragged, notched, or blurred  Color: the color within the mole or growth varies  Diameter: the mole or growth is larger than 6 mm (size of a pencil eraser)  Evolving: the size, shape, or color of the mole or growth is changing (evolving is not shown in the images below)    Checking for other types of skin cancer  Basal cell carcinoma or squamous cell carcinoma have symptoms such as:     A spot or mole that looks different from all other marks on your skin  Changes in how an area feels, such as itching, tenderness, or pain  Changes in the skin's surface, such as oozing, bleeding, or scaliness  A sore that does not heal  New swelling or redness beyond the border of a  mole    Who s at risk?  Anyone can get skin cancer. But you are at greater risk if you have:   Fair skin, light-colored hair, or light-colored eyes  Many moles or abnormal moles on your skin  A history of sunburns from sunlight or tanning beds  A family history of skin cancer  A history of exposure to radiation or chemicals  A weakened immune system  If you have had skin cancer in the past, you are at risk for recurring skin cancer.   How to check your skin  Do your monthly skin checkups in front of a full-length mirror. Check all parts of your body, including your:   Head (ears, face, neck, and scalp)  Torso (front, back, and sides)  Arms (tops, undersides, upper, and lower armpits)  Hands (palms, backs, and fingers, including under the nails)  Buttocks and genitals  Legs (front, back, and sides)  Feet (tops, soles, toes, including under the nails, and between toes)  If you have a lot of moles, take digital photos of them each month. Make sure to take photos both up close and from a distance. These can help you see if any moles change over time.   Most skin changes are not cancer. But if you see any changes in your skin, call your doctor right away. Only he or she can diagnose a problem. If you have skin cancer, seeing your doctor can be the first step toward getting the treatment that could save your life.   [employer] last reviewed this educational content on 4/1/2019 2000-2020 [employer]. [address] [place] [address], [place] [address]. All rights reserved. This information is not intended as a substitute for professional medical care. Always follow your healthcare professional's instructions.       When should I call my doctor?  If you are worsening or not improving, please, contact us or seek urgent care as noted below.     Who should I call with questions (adults)?    [facility] [phone]  For urgent needs outside of business hours call the [facility] at  [phone] and ask for the dermatology resident on call to be paged  If this is a medical emergency and you are unable to reach an ER, Call 911      If you need a prescription refill, please contact your pharmacy. Refills are approved or denied by our Physicians during normal business hours, Monday through Fridays  Per office policy, refills will not be granted if you have not been seen within the past year (or sooner depending on your child's condition)

## 2024-11-07 NOTE — PROGRESS NOTES
[facility] Dermatology Note  Encounter Date: Nov 7, 2024  Office Visit      Dermatology Problem List:  FBSE 11/7/24  1. 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 -Excised 3/20/2024  - MM - L upper arm (T1a) and right upper arm (T2b), WLE and SNL bx 10/16/23  # History of DN   - moderate to severe atypia - L lower back, excision 3/20/2024  # AK - cryo    FHx: pancreatic cancer - mother  PMHx: CHEK2 mutation positive  ____________________________________________    Assessment & Plan:  # Hx melanoma x3  - edu on implications of CHEK2 mutation - increased risk unknown as data is limited as to how much of an increased risk for melanoma she is at, reviewed genetic testing encounter patient had with oncology   - ABCDEs: Counseled ABCDEs of melanoma: Asymmetry, Border (irregularity), Color (not uniform, changes in color), Diameter (greater than 6 mm which is about the size of a pencil eraser), and Evolving (any changes in preexisting moles).  - Sun protection: Counseled SPF30+ sunscreen, UPF clothing, sun avoidance, tanning bed avoidance.   - Recommended yearly dental, ophthalmology, and gynecology exams.  - Recommended skin exams for all first-degree relatives.  - Recommended follow up is 3 months    # Hx of DN  - Signs and Symptoms of non-melanoma skin cancer and ABCDEs of melanoma reviewed with patient. Patient encouraged to perform monthly self skin exams and educated on how to perform them. UV precautions reviewed with patient. Patient was asked about new or changing moles/lesions on body.   - Sunscreen: Apply 20 minutes prior to going outdoors and reapply every two hours, when wet or sweating. We recommend using an SPF 30 or higher, and to use one that is water resistant.       # Ak - L  eyebrow x1  - cryo  - edu on etiology    # Benign findings: multiple benign nevi, lentigines, cherry angiomas, SKs  - edu on benign etiology  - Signs and Symptoms of non-melanoma skin cancer and ABCDEs of melanoma reviewed with patient. Patient encouraged to perform monthly self skin exams and educated on how to perform them. UV precautions reviewed with patient. Patient was asked about new or changing moles/lesions on body.   - Sunscreen: Apply 20 minutes prior to going outdoors and reapply every two hours, when wet or sweating. We recommend using an SPF 30 or higher, and to use one that is water resistant.     - RTC for changes    Procedures Performed:   - Cryotherapy procedure note, location(s): L eyebrow. After verbal consent and discussion of risks and benefits including, but not limited to, dyspigmentation/scar, blister, and pain, 1 lesion(s) was(were) treated with 1-2 mm freeze border for 1-2 cycles with liquid nitrogen. Post cryotherapy instructions were provided.     Follow-up: 3 month(s) in-person, or earlier for new or changing lesions    Staff:     All risks, benefits and alternatives were discussed with patient.  Patient is in agreement and understands the assessment and plan.  All questions were answered.    Livia Zamorano PA-C, MPAS  [facility]: Phone: [phone], Fax: [phone]  [facility]: Phone: [phone],  Fax: [phone]  [facility]: Phone: [phone], Fax: [phone]  ____________________________________________    CC: Skin Check (3 month return FBSC/Concerns: None)      Reviewed patients past medical history and pertinent chart review prior to patient's visit today.     HPI:  Ms. Shanelle Sepulveda is a 73 year old female who presents today as a return patient for FBSE. Hx of MM x3 as well as DN. Had genetic testing done since last visit with dermatology. Has no new  lesions of concern.     Patient is otherwise feeling well, without additional concerns.    Labs:  none    Physical Exam:  Vitals: LMP 10/24/2005   SKIN: Full skin, which includes the head/face, both arms, chest, back, abdomen,both legs, genitalia and/or groin buttocks, digits and/or nails, was examined.   - Napoles's skin type I-II, <100 nevi  - There are dome shaped bright red papules on the trunk.   - Multiple regular brown pigmented macules and papules are identified on the trunk and extremities.   - Scattered brown macules on sun exposed areas.  - There are waxy stuck on tan to brown papules on the trunk.   - Gritty pink macule near left eyebrow x1  -There are well healed surgical scars without erythema, nodularity or telangiectasias on the sites of previous melanomas  - No other lesions of concern on areas examined.   LYMPH: Examination of the pre/post auricular, occipital, cervical, clavicular, axillary and inguinal lymph nodes was negative.    Medications:  Current Outpatient Medications   Medication Sig Dispense Refill    ASPIRIN PO Take 81 mg by mouth daily      Cholecalciferol (VITAMIN D3) 50 MCG (2000 UT) CAPS       fish oil-omega-3 fatty acids 1000 MG capsule Take 1 capsule (1 g) by mouth 3 times daily 120 capsule 11    lisinopril (ZESTRIL) 40 MG tablet Take 1 tablet (40 mg) by mouth daily 90 tablet 3    metoprolol succinate ER (TOPROL XL) 200 MG 24 hr tablet Take 1 tablet (200 mg) by mouth daily 90 tablet 3    Multiple Vitamins-Minerals (MULTIVITAL PO) Take  by mouth.      pravastatin (PRAVACHOL) 20 MG tablet Take 1 tablet (20 mg) by mouth daily 90 tablet 3    venlafaxine (EFFEXOR XR) 150 MG 24 hr capsule TAKE 1 CAPSULE EVERY DAY 90 capsule 3    ketorolac (ACULAR) 0.5 % ophthalmic solution       moxifloxacin (VIGAMOX) 0.5 % ophthalmic solution       prednisoLONE acetate (PRED FORTE) 1 % ophthalmic suspension        No current facility-administered medications for this visit.      Past  Medical/Surgical History:   Patient Active Problem List   Diagnosis    Generalized anxiety disorder    Overactive bladder    Atrophic vaginitis    Hyperlipidemia with target LDL less than 130    HTN, goal below 140/90    Urgency-frequency syndrome    Family history of malignant neoplasm of breast    Cervical cancer screening    Major depression in complete remission (H)    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, including shoulder, left (H)    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, including shoulder, right (H)    Class 2 severe obesity due to excess calories with serious comorbidity in adult (H)    CHEK2 gene mutation positive     Past Medical History:   Diagnosis Date    Intramural leiomyoma of uterus     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, including shoulder, left (H) 9/21/2023    MENOPAUSAL DISORDER NOS 8/10/2005    Menses getting much heavier, pelvic sono 02/06--2 uterine fibroids seen, 1.5 and 2.7cm each; simple left ovarian cyst 4.2 cm, 2.6 cm complex right ovarian cyst--endometrial biopsy benign    Symptomatic menopausal or female climacteric states

## 2024-12-11 SDOH — HEALTH STABILITY: PHYSICAL HEALTH: ON AVERAGE, HOW MANY MINUTES DO YOU ENGAGE IN EXERCISE AT THIS LEVEL?: 20 MIN

## 2024-12-11 SDOH — HEALTH STABILITY: PHYSICAL HEALTH: ON AVERAGE, HOW MANY DAYS PER WEEK DO YOU ENGAGE IN MODERATE TO STRENUOUS EXERCISE (LIKE A BRISK WALK)?: 1 DAY

## 2024-12-11 ASSESSMENT — SOCIAL DETERMINANTS OF HEALTH (SDOH): HOW OFTEN DO YOU GET TOGETHER WITH FRIENDS OR RELATIVES?: ONCE A WEEK

## 2024-12-15 ASSESSMENT — PATIENT HEALTH QUESTIONNAIRE - PHQ9
SUM OF ALL RESPONSES TO PHQ QUESTIONS 1-9: 3
10. IF YOU CHECKED OFF ANY PROBLEMS, HOW DIFFICULT HAVE THESE PROBLEMS MADE IT FOR YOU TO DO YOUR WORK, TAKE CARE OF THINGS AT HOME, OR GET ALONG WITH OTHER PEOPLE: NOT DIFFICULT AT ALL
SUM OF ALL RESPONSES TO PHQ QUESTIONS 1-9: 3

## 2024-12-16 ENCOUNTER — OFFICE VISIT (OUTPATIENT)
Dept: INTERNAL MEDICINE | Facility: CLINIC | Age: 74
End: 2024-12-16
Payer: COMMERCIAL

## 2024-12-16 VITALS
RESPIRATION RATE: 16 BRPM | OXYGEN SATURATION: 96 % | BODY MASS INDEX: 35.08 KG/M2 | HEART RATE: 112 BPM | WEIGHT: 198 LBS | SYSTOLIC BLOOD PRESSURE: 158 MMHG | DIASTOLIC BLOOD PRESSURE: 90 MMHG | TEMPERATURE: 97.8 F | HEIGHT: 63 IN

## 2024-12-16 DIAGNOSIS — I10 HTN, GOAL BELOW 140/90: ICD-10-CM

## 2024-12-16 DIAGNOSIS — I10 ESSENTIAL HYPERTENSION, BENIGN: ICD-10-CM

## 2024-12-16 DIAGNOSIS — E66.01 CLASS 2 SEVERE OBESITY DUE TO EXCESS CALORIES WITH SERIOUS COMORBIDITY AND BODY MASS INDEX (BMI) OF 35.0 TO 35.9 IN ADULT (H): ICD-10-CM

## 2024-12-16 DIAGNOSIS — Z00.00 PREVENTATIVE HEALTH CARE: Primary | ICD-10-CM

## 2024-12-16 DIAGNOSIS — F33.42 RECURRENT MAJOR DEPRESSIVE DISORDER, IN FULL REMISSION (H): ICD-10-CM

## 2024-12-16 DIAGNOSIS — E83.52 HYPERCALCEMIA: ICD-10-CM

## 2024-12-16 DIAGNOSIS — E78.5 HYPERLIPIDEMIA LDL GOAL <130: ICD-10-CM

## 2024-12-16 DIAGNOSIS — G47.10 EXCESSIVE SLEEPINESS: ICD-10-CM

## 2024-12-16 DIAGNOSIS — E66.812 CLASS 2 SEVERE OBESITY DUE TO EXCESS CALORIES WITH SERIOUS COMORBIDITY AND BODY MASS INDEX (BMI) OF 35.0 TO 35.9 IN ADULT (H): ICD-10-CM

## 2024-12-16 LAB
BASOPHILS # BLD AUTO: 0 10E3/UL (ref 0–0.2)
BASOPHILS NFR BLD AUTO: 0 %
EOSINOPHIL # BLD AUTO: 0 10E3/UL (ref 0–0.7)
EOSINOPHIL NFR BLD AUTO: 0 %
ERYTHROCYTE [DISTWIDTH] IN BLOOD BY AUTOMATED COUNT: 12.3 % (ref 10–15)
HCT VFR BLD AUTO: 44 % (ref 35–47)
HGB BLD-MCNC: 14.9 G/DL (ref 11.7–15.7)
IMM GRANULOCYTES # BLD: 0 10E3/UL
IMM GRANULOCYTES NFR BLD: 0 %
LYMPHOCYTES # BLD AUTO: 1.7 10E3/UL (ref 0.8–5.3)
LYMPHOCYTES NFR BLD AUTO: 27 %
MCH RBC QN AUTO: 31.7 PG (ref 26.5–33)
MCHC RBC AUTO-ENTMCNC: 33.9 G/DL (ref 31.5–36.5)
MCV RBC AUTO: 94 FL (ref 78–100)
MONOCYTES # BLD AUTO: 0.6 10E3/UL (ref 0–1.3)
MONOCYTES NFR BLD AUTO: 10 %
NEUTROPHILS # BLD AUTO: 4 10E3/UL (ref 1.6–8.3)
NEUTROPHILS NFR BLD AUTO: 63 %
PLATELET # BLD AUTO: 271 10E3/UL (ref 150–450)
RBC # BLD AUTO: 4.7 10E6/UL (ref 3.8–5.2)
WBC # BLD AUTO: 6.3 10E3/UL (ref 4–11)

## 2024-12-16 PROCEDURE — 36415 COLL VENOUS BLD VENIPUNCTURE: CPT | Performed by: INTERNAL MEDICINE

## 2024-12-16 PROCEDURE — 80061 LIPID PANEL: CPT | Performed by: INTERNAL MEDICINE

## 2024-12-16 PROCEDURE — 80053 COMPREHEN METABOLIC PANEL: CPT | Performed by: INTERNAL MEDICINE

## 2024-12-16 PROCEDURE — 85025 COMPLETE CBC W/AUTO DIFF WBC: CPT | Performed by: INTERNAL MEDICINE

## 2024-12-16 PROCEDURE — 84443 ASSAY THYROID STIM HORMONE: CPT | Performed by: INTERNAL MEDICINE

## 2024-12-16 PROCEDURE — 99214 OFFICE O/P EST MOD 30 MIN: CPT | Mod: 25 | Performed by: INTERNAL MEDICINE

## 2024-12-16 PROCEDURE — G0439 PPPS, SUBSEQ VISIT: HCPCS | Performed by: INTERNAL MEDICINE

## 2024-12-16 RX ORDER — VENLAFAXINE HYDROCHLORIDE 150 MG/1
CAPSULE, EXTENDED RELEASE ORAL
Qty: 90 CAPSULE | Refills: 3 | Status: SHIPPED | OUTPATIENT
Start: 2024-12-16

## 2024-12-16 RX ORDER — PRAVASTATIN SODIUM 20 MG
20 TABLET ORAL DAILY
Qty: 90 TABLET | Refills: 3 | Status: SHIPPED | OUTPATIENT
Start: 2024-12-16

## 2024-12-16 RX ORDER — METOPROLOL SUCCINATE 200 MG/1
200 TABLET, EXTENDED RELEASE ORAL DAILY
Qty: 90 TABLET | Refills: 3 | Status: SHIPPED | OUTPATIENT
Start: 2024-12-16

## 2024-12-16 RX ORDER — HYDROCHLOROTHIAZIDE 12.5 MG/1
12.5 TABLET ORAL DAILY
Qty: 90 TABLET | Refills: 1 | Status: SHIPPED | OUTPATIENT
Start: 2024-12-16

## 2024-12-16 RX ORDER — LISINOPRIL 40 MG/1
40 TABLET ORAL DAILY
Qty: 90 TABLET | Refills: 3 | Status: SHIPPED | OUTPATIENT
Start: 2024-12-16

## 2024-12-16 NOTE — PROGRESS NOTES
"Preventive Care Visit  [facility]  Anna Valerio MD, Internal Medicine  Dec 16, 2024      Assessment & Plan     Preventative health care       Class 2 severe obesity due to excess calories with serious comorbidity and body mass index (BMI) of 35.0 to 35.9 in adult (H)       Recurrent major depressive disorder, in full remission (H)  under good control   - venlafaxine (EFFEXOR XR) 150 MG 24 hr capsule; TAKE 1 CAPSULE EVERY DAY    Essential hypertension, benign   under inadequate control Will add hydrochlorathiazide Follow up in 6 weeks   - CBC with platelets and differential; Future  - TSH with free T4 reflex; Future  - Comprehensive metabolic panel (BMP + Alb, Alk Phos, ALT, AST, Total. Bili, TP); Future  - Lipid Profile (Chol, Trig, HDL, LDL calc); Future  - metoprolol succinate ER (TOPROL XL) 200 MG 24 hr tablet; Take 1 tablet (200 mg) by mouth daily.  - hydroCHLOROthiazide 12.5 MG tablet; Take 1 tablet (12.5 mg) by mouth daily.    Hyperlipidemia LDL goal <130     - pravastatin (PRAVACHOL) 20 MG tablet; Take 1 tablet (20 mg) by mouth daily.    HTN, goal below 140/90     - lisinopril (ZESTRIL) 40 MG tablet; Take 1 tablet (40 mg) by mouth daily.    Excessive sleepiness   Will refer to   - Adult Sleep Eval & Management  Referral; Future    Patient has been advised of split billing requirements and indicates understanding: Yes        BMI  Estimated body mass index is 35.07 kg/m  as calculated from the following:    Height as of this encounter: 1.6 m (5' 3\").    Weight as of this encounter: 89.8 kg (198 lb).   Weight management plan: Discussed healthy diet and exercise guidelines    Counseling  Appropriate preventive services were addressed with this patient via screening, questionnaire, or discussion as appropriate for fall prevention, nutrition, physical activity, Tobacco-use cessation, social engagement, weight loss and cognition.  Checklist reviewing preventive services " available has been given to the patient.  Reviewed patient's diet, addressing concerns and/or questions.   She is at risk for lack of exercise and has been provided with information to increase physical activity for the benefit of her well-being.   She is at risk for psychosocial distress and has been provided with information to reduce risk.   Discussed possible causes of fatigue. Information on urinary incontinence and treatment options given to patient.       Subjective   Mckenzie is a 74 year old, presenting for the following:  Medicare Visit and Sleep Problem        12/16/2024     2:32 PM   Additional Questions   Roomed by RIYA Diaz LPN   Accompanied by self         12/16/2024     2:32 PM   Patient Reported Additional Medications   Patient reports taking the following new medications none           HPI    She has a lot of fatigue and sleepiness. Will get 10 hrs of sleep and wake up tired.     Health Care Directive  Patient has a Health Care Directive on file  Advance care planning document is on file and is current.      12/11/2024   General Health   How would you rate your overall physical health? Good   Feel stress (tense, anxious, or unable to sleep) To some extent      (!) STRESS CONCERN      12/11/2024   Nutrition   Diet: Regular (no restrictions)            12/11/2024   Exercise   Days per week of moderate/strenous exercise 1 day   Average minutes spent exercising at this level 20 min      (!) EXERCISE CONCERN      12/11/2024   Social Factors   Frequency of gathering with friends or relatives Once a week   Worry food won't last until get money to buy more No   Food not last or not have enough money for food? No   Do you have housing? (Housing is defined as stable permanent housing and does not include staying ouside in a car, in a tent, in an abandoned building, in an overnight shelter, or couch-surfing.) Yes   Are you worried about losing your housing? No   Lack of transportation? No   Unable to get  utilities (heat,electricity)? No            12/11/2024   Fall Risk   Fallen 2 or more times in the past year? No     No    Trouble with walking or balance? No     No        Patient-reported    Multiple values from one day are sorted in reverse-chronological order          12/11/2024   Activities of Daily Living- Home Safety   Needs help with the following daily activites None of the above   Safety concerns in the home None of the above            12/11/2024   Dental   Dentist two times every year? Yes            12/11/2024   Hearing Screening   Hearing concerns? None of the above            12/11/2024   Driving Risk Screening   Patient/family members have concerns about driving No            12/11/2024   General Alertness/Fatigue Screening   Have you been more tired than usual lately? (!) YES            12/11/2024   Urinary Incontinence Screening   Bothered by leaking urine in past 6 months Yes            12/11/2024   TB Screening   Were you born outside of the [place]? No          Today's PHQ-9 Score:       12/15/2024     4:57 PM   PHQ-9 SCORE   PHQ-9 Total Score MyChart 3 (Minimal depression)   PHQ-9 Total Score 3        Patient-reported         12/11/2024   Substance Use   Alcohol more than 3/day or more than 7/wk No   Do you have a current opioid prescription? No   How severe/bad is pain from 1 to 10? 0/10 (No Pain)   Do you use any other substances recreationally? No        Social History     Tobacco Use    Smoking status: Never    Smokeless tobacco: Never   Vaping Use    Vaping status: Never Used   Substance Use Topics    Alcohol use: Yes     Comment: rare    Drug use: No           1/22/2024   LAST FHS-7 RESULTS   1st degree relative breast or ovarian cancer Yes   Any relative bilateral breast cancer No   Any male have breast cancer No   Any ONE woman have BOTH breast AND ovarian cancer No   Any woman with breast cancer before 50yrs No   2 or more relatives with breast AND/OR ovarian cancer No   2 or more relatives  with breast AND/OR bowel cancer No           Mammogram Screening - Mammogram every 1-2 years updated in Health Maintenance based on mutual decision making    ASCVD Risk   The 10-year ASCVD risk score (Joleen WYNNE, et al., 2019) is: 26.7%    Values used to calculate the score:      Age: 74 years      Sex: Female      Is Non- : No      Diabetic: No      Tobacco smoker: No      Systolic Blood Pressure: 154 mmHg      Is BP treated: Yes      HDL Cholesterol: 53 mg/dL      Total Cholesterol: 216 mg/dL            Reviewed and updated as needed this visit by Provider                    Past Medical History:   Diagnosis Date    Intramural leiomyoma of uterus     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, including shoulder, left (H) 9/21/2023    MENOPAUSAL DISORDER NOS 8/10/2005    Menses getting much heavier, pelvic sono 02/06--2 uterine fibroids seen, 1.5 and 2.7cm each; simple left ovarian cyst 4.2 cm, 2.6 cm complex right ovarian cyst--endometrial biopsy benign    Symptomatic menopausal or female climacteric states      Past Surgical History:   Procedure Laterality Date    BIOPSY NODE SENTINEL Right 10/16/2023    Procedure: RIGHT SENTINEL LYMPH NODE BIOPSY;  Surgeon: Roney Castrejon MD;  Location: [facility] OR    COLONOSCOPY  02/17/2004    wnl    COLONOSCOPY N/A 6/16/2016    Procedure: COLONOSCOPY;  Surgeon: Lloyd Gutierrez MD;  Location: [facility]    EXCISE LESION UPPER EXTREMITY Right 10/16/2023    Procedure: RIGHT ARM WIDE LOCAL EXCISION;  Surgeon: Roney Castrejon MD;  Location: [facility] OR    EXCISE MASS UPPER EXTREMITY Left 10/16/2023    Procedure: LEFT ARM WIDE LOCAL EXCISION;  Surgeon: Roney Castrejon MD;  Location: [facility] OR    HYSTERECTOMY, VAGINAL  4/18/06    Laparoscopic supracerv hyst-BSO     Current providers sharing in care for this patient include:  Patient Care Team:  Anna Valerio MD as PCP - General (Internal Medicine)  Anna Valerio MD as Assigned PCP  Tio Rainey  MD as Referring Physician (Internal Medicine)  Livia Zamorano PA-C as Physician Assistant (Dermatology)  Roney Castrejon MD as MD (Surgery)  Marbella Devi, RN as Specialty Care Coordinator (Hematology & Oncology)  Aline Teague PA-C as Physician Assistant (Dermatology)  Aline Teague PA-C as Assigned Surgical Provider  Debra Mccullough PA-C as Assigned Musculoskeletal Provider    The following health maintenance items are reviewed in Epic and correct as of today:  Health Maintenance   Topic Date Due    LIPID  12/11/2024    ANNUAL REVIEW OF HM ORDERS  12/11/2024    MEDICARE ANNUAL WELLNESS VISIT  12/11/2024    MAMMO SCREENING  01/22/2025    BMP  04/11/2025    PHQ-9  06/16/2025    FALL RISK ASSESSMENT  12/16/2025    COLORECTAL CANCER SCREENING  12/21/2026    GLUCOSE  04/11/2027    DTAP/TDAP/TD IMMUNIZATION (3 - Td or Tdap) 01/02/2028    ADVANCE CARE PLANNING  06/05/2029    DEXA  08/14/2033    HEPATITIS C SCREENING  Completed    DEPRESSION ACTION PLAN  Completed    INFLUENZA VACCINE  Completed    Pneumococcal Vaccine: 65+ Years  Completed    ZOSTER IMMUNIZATION  Completed    RSV VACCINE  Completed    COVID-19 Vaccine  Completed    HPV IMMUNIZATION  Aged Out    MENINGITIS IMMUNIZATION  Aged Out    RSV MONOCLONAL ANTIBODY  Aged Out         Review of Systems  CONSTITUTIONAL: NEGATIVE for fever, chills, change in weight  INTEGUMENTARY/SKIN: NEGATIVE for worrisome rashes, moles or lesions  EYES: NEGATIVE for vision changes or irritation  ENT/MOUTH: NEGATIVE for ear, mouth and throat problems  RESP: NEGATIVE for significant cough or SOB  BREAST: NEGATIVE for masses, tenderness or discharge  CV: NEGATIVE for chest pain, palpitations or peripheral edema  GI: NEGATIVE for nausea, abdominal pain, heartburn, or change in bowel habits  : NEGATIVE for frequency, dysuria, or hematuria  MUSCULOSKELETAL: NEGATIVE for significant arthralgias or myalgia  NEURO: NEGATIVE for weakness, dizziness or  "paresthesias  ENDOCRINE: NEGATIVE for temperature intolerance, skin/hair changes  HEME: NEGATIVE for bleeding problems  PSYCHIATRIC: NEGATIVE for changes in mood or affect     Objective    Exam  BP (!) 154/93   Pulse 112   Temp 97.8  F (36.6  C) (Oral)   Resp 16   Ht 1.6 m (5' 3\")   Wt 89.8 kg (198 lb)   LMP 10/24/2005   SpO2 96%   Breastfeeding No   BMI 35.07 kg/m     Estimated body mass index is 35.07 kg/m  as calculated from the following:    Height as of this encounter: 1.6 m (5' 3\").    Weight as of this encounter: 89.8 kg (198 lb).    Physical Exam  GENERAL: alert and no distress  EYES: Eyes grossly normal to inspection, PERRL and conjunctivae and sclerae normal  HENT: ear canals and TM's normal, nose and mouth without ulcers or lesions  NECK: no adenopathy, no asymmetry, masses, or scars  RESP: lungs clear to auscultation - no rales, rhonchi or wheezes  CV: regular rate and rhythm, normal S1 S2, no S3 or S4, no murmur, click or rub, no peripheral edema  ABDOMEN: soft, nontender, no hepatosplenomegaly, no masses and bowel sounds normal  MS: no gross musculoskeletal defects noted, no edema  SKIN: no suspicious lesions or rashes  NEURO: Normal strength and tone, mentation intact and speech normal  PSYCH: mentation appears normal, affect normal/bright         No data to display                       Signed Electronically by: Anna Valerio MD    Answers submitted by the patient for this visit:  Patient Health Questionnaire (Submitted on 12/15/2024)  If you checked off any problems, how difficult have these problems made it for you to do your work, take care of things at home, or get along with other people?: Not difficult at all  PHQ9 TOTAL SCORE: 3    "

## 2024-12-17 ENCOUNTER — LAB (OUTPATIENT)
Dept: LAB | Facility: CLINIC | Age: 74
End: 2024-12-17
Payer: COMMERCIAL

## 2024-12-17 DIAGNOSIS — E83.52 HYPERCALCEMIA: ICD-10-CM

## 2024-12-17 LAB
ALBUMIN SERPL BCG-MCNC: 4.8 G/DL (ref 3.5–5.2)
ALP SERPL-CCNC: 83 U/L (ref 40–150)
ALT SERPL W P-5'-P-CCNC: 48 U/L (ref 0–50)
ANION GAP SERPL CALCULATED.3IONS-SCNC: 13 MMOL/L (ref 7–15)
AST SERPL W P-5'-P-CCNC: 42 U/L (ref 0–45)
BILIRUB SERPL-MCNC: 0.3 MG/DL
BUN SERPL-MCNC: 19.3 MG/DL (ref 8–23)
CALCIUM SERPL-MCNC: 10.7 MG/DL (ref 8.8–10.4)
CHLORIDE SERPL-SCNC: 104 MMOL/L (ref 98–107)
CHOLEST SERPL-MCNC: 233 MG/DL
CREAT SERPL-MCNC: 1.05 MG/DL (ref 0.51–0.95)
EGFRCR SERPLBLD CKD-EPI 2021: 55 ML/MIN/1.73M2
FASTING STATUS PATIENT QL REPORTED: YES
FASTING STATUS PATIENT QL REPORTED: YES
GLUCOSE SERPL-MCNC: 104 MG/DL (ref 70–99)
HCO3 SERPL-SCNC: 28 MMOL/L (ref 22–29)
HDLC SERPL-MCNC: 55 MG/DL
LDLC SERPL CALC-MCNC: 146 MG/DL
NONHDLC SERPL-MCNC: 178 MG/DL
POTASSIUM SERPL-SCNC: 5.2 MMOL/L (ref 3.4–5.3)
PROT SERPL-MCNC: 8.1 G/DL (ref 6.4–8.3)
SODIUM SERPL-SCNC: 145 MMOL/L (ref 135–145)
TRIGL SERPL-MCNC: 161 MG/DL
TSH SERPL DL<=0.005 MIU/L-ACNC: 0.82 UIU/ML (ref 0.3–4.2)

## 2024-12-17 PROCEDURE — 36415 COLL VENOUS BLD VENIPUNCTURE: CPT

## 2024-12-17 PROCEDURE — 82306 VITAMIN D 25 HYDROXY: CPT

## 2024-12-17 PROCEDURE — 80048 BASIC METABOLIC PNL TOTAL CA: CPT

## 2024-12-17 PROCEDURE — 83970 ASSAY OF PARATHORMONE: CPT

## 2024-12-18 ENCOUNTER — TELEPHONE (OUTPATIENT)
Dept: INTERNAL MEDICINE | Facility: CLINIC | Age: 74
End: 2024-12-18
Payer: COMMERCIAL

## 2024-12-18 DIAGNOSIS — I10 ESSENTIAL HYPERTENSION: Primary | ICD-10-CM

## 2024-12-18 LAB
ANION GAP SERPL CALCULATED.3IONS-SCNC: 10 MMOL/L (ref 7–15)
BUN SERPL-MCNC: 21.5 MG/DL (ref 8–23)
CALCIUM SERPL-MCNC: 10 MG/DL (ref 8.8–10.4)
CHLORIDE SERPL-SCNC: 104 MMOL/L (ref 98–107)
CREAT SERPL-MCNC: 0.99 MG/DL (ref 0.51–0.95)
EGFRCR SERPLBLD CKD-EPI 2021: 60 ML/MIN/1.73M2
GLUCOSE SERPL-MCNC: 85 MG/DL (ref 70–99)
HCO3 SERPL-SCNC: 29 MMOL/L (ref 22–29)
POTASSIUM SERPL-SCNC: 5 MMOL/L (ref 3.4–5.3)
PTH-INTACT SERPL-MCNC: 31 PG/ML (ref 15–65)
SODIUM SERPL-SCNC: 143 MMOL/L (ref 135–145)
VIT D+METAB SERPL-MCNC: 96 NG/ML (ref 20–50)

## 2024-12-18 RX ORDER — AMLODIPINE BESYLATE 5 MG/1
5 TABLET ORAL DAILY
Qty: 90 TABLET | Refills: 3 | Status: SHIPPED | OUTPATIENT
Start: 2024-12-18

## 2024-12-18 NOTE — TELEPHONE ENCOUNTER
She is correct she might get gout. She should stop the hydrochlorothiazide. Lets have her start Norvasc 5 mg a day instead.  Rx has been sent in

## 2024-12-18 NOTE — TELEPHONE ENCOUNTER
S-(situation): patient reports that she was previously on Maxzide (triamterene-hydrochlorothiazide) and developed gout from it    B-(background): PCP prescribed hydrochlorothiazide on 12/16 for HTN    A-(assessment): patient inquiring if medication may cause her to have gout again. Patient was last on Maxzide 8/16/21    R-(recommendations): routing to PCP, please advise if OK to move forward with hydrochlorothiazide or if PCP would advise alternative.     Summer RN 10:01 AM December 18, 2024   [facility]

## 2024-12-18 NOTE — TELEPHONE ENCOUNTER
Called patient and left message to call the clinic back or check myc message     Myc message sent to patient.

## 2025-01-15 NOTE — LETTER
April 15, 2024      Mckenzie Sepulveda  [address] [facility] [place] [address]        Dear ,    We are writing to inform you of your test results.    Labs are overall quite good     We advise:     Continue current cares.   Balanced low cholesterol diet.   Regular exercise.     Please proceed with surgery     For additional lab test information, [email or web address] is an excellent reference.     Resulted Orders   Comprehensive metabolic panel   Result Value Ref Range    Sodium 143 135 - 145 mmol/L      Comment:      Reference intervals for this test were updated on 09/26/2023 to more accurately reflect our healthy population. There may be differences in the flagging of prior results with similar values performed with this method. Interpretation of those prior results can be made in the context of the updated reference intervals.     Potassium 4.4 3.4 - 5.3 mmol/L    Carbon Dioxide (CO2) 26 22 - 29 mmol/L    Anion Gap 12 7 - 15 mmol/L    Urea Nitrogen 18.6 8.0 - 23.0 mg/dL    Creatinine 0.90 0.51 - 0.95 mg/dL    GFR Estimate 67 >60 mL/min/1.73m2    Calcium 10.2 8.8 - 10.2 mg/dL    Chloride 105 98 - 107 mmol/L    Glucose 104 (H) 70 - 99 mg/dL    Alkaline Phosphatase 65 40 - 150 U/L      Comment:      Reference intervals for this test were updated on 11/14/2023 to more accurately reflect our healthy population. There may be differences in the flagging of prior results with similar values performed with this method. Interpretation of those prior results can be made in the context of the updated reference intervals.    AST 30 0 - 45 U/L      Comment:      Reference intervals for this test were updated on 6/12/2023 to more accurately reflect our healthy population. There may be differences in the flagging of prior results with similar values performed with this method. Interpretation of those prior results can be made in the context of the updated reference intervals.    ALT 39 0 - 50 U/L      Comment:      Reference  intervals for this test were updated on 6/12/2023 to more accurately reflect our healthy population. There may be differences in the flagging of prior results with similar values performed with this method. Interpretation of those prior results can be made in the context of the updated reference intervals.      Protein Total 7.5 6.4 - 8.3 g/dL    Albumin 4.7 3.5 - 5.2 g/dL    Bilirubin Total 0.3 <=1.2 mg/dL   CBC with platelets   Result Value Ref Range    WBC Count 3.8 (L) 4.0 - 11.0 10e3/uL    RBC Count 4.44 3.80 - 5.20 10e6/uL    Hemoglobin 14.0 11.7 - 15.7 g/dL    Hematocrit 41.4 35.0 - 47.0 %    MCV 93 78 - 100 fL    MCH 31.5 26.5 - 33.0 pg    MCHC 33.8 31.5 - 36.5 g/dL    RDW 13.0 10.0 - 15.0 %    Platelet Count 242 150 - 450 10e3/uL   UA Macroscopic with reflex to Microscopic and Culture   Result Value Ref Range    Color Urine Yellow Colorless, Straw, Light Yellow, Yellow    Appearance Urine Clear Clear    Glucose Urine Negative Negative mg/dL    Bilirubin Urine Negative Negative    Ketones Urine Negative Negative mg/dL    Specific Gravity Urine >=1.030 1.003 - 1.035    Blood Urine Negative Negative    pH Urine 5.5 5.0 - 7.0    Protein Albumin Urine Negative Negative mg/dL    Urobilinogen Urine 0.2 0.2, 1.0 E.U./dL    Nitrite Urine Negative Negative    Leukocyte Esterase Urine Small (A) Negative   UA Microscopic with Reflex to Culture   Result Value Ref Range    RBC Urine 0-2 0-2 /HPF /HPF    WBC Urine 0-5 0-5 /HPF /HPF    Squamous Epithelials Urine Few (A) None Seen /LPF    Narrative    Urine Culture not indicated       If you have any questions or concerns, please call the clinic at the number listed above.       Sincerely,            Eduardo Barger M.D.                                 18.08

## 2025-01-27 ASSESSMENT — PATIENT HEALTH QUESTIONNAIRE - PHQ9
SUM OF ALL RESPONSES TO PHQ QUESTIONS 1-9: 2
SUM OF ALL RESPONSES TO PHQ QUESTIONS 1-9: 2
10. IF YOU CHECKED OFF ANY PROBLEMS, HOW DIFFICULT HAVE THESE PROBLEMS MADE IT FOR YOU TO DO YOUR WORK, TAKE CARE OF THINGS AT HOME, OR GET ALONG WITH OTHER PEOPLE: NOT DIFFICULT AT ALL

## 2025-01-28 ENCOUNTER — OFFICE VISIT (OUTPATIENT)
Dept: INTERNAL MEDICINE | Facility: CLINIC | Age: 75
End: 2025-01-28
Payer: COMMERCIAL

## 2025-01-28 VITALS
BODY MASS INDEX: 35.79 KG/M2 | SYSTOLIC BLOOD PRESSURE: 124 MMHG | DIASTOLIC BLOOD PRESSURE: 76 MMHG | WEIGHT: 202 LBS | HEIGHT: 63 IN | HEART RATE: 89 BPM | RESPIRATION RATE: 16 BRPM | OXYGEN SATURATION: 95 % | TEMPERATURE: 97.1 F

## 2025-01-28 DIAGNOSIS — E78.5 HYPERLIPIDEMIA WITH TARGET LDL LESS THAN 130: ICD-10-CM

## 2025-01-28 DIAGNOSIS — E66.812 CLASS 2 SEVERE OBESITY DUE TO EXCESS CALORIES WITH SERIOUS COMORBIDITY AND BODY MASS INDEX (BMI) OF 35.0 TO 35.9 IN ADULT (H): ICD-10-CM

## 2025-01-28 DIAGNOSIS — F33.42 RECURRENT MAJOR DEPRESSIVE DISORDER, IN FULL REMISSION: ICD-10-CM

## 2025-01-28 DIAGNOSIS — Z78.0 ASYMPTOMATIC POSTMENOPAUSAL STATUS: ICD-10-CM

## 2025-01-28 DIAGNOSIS — C43.62 MALIGNANT MELANOMA OF SKIN OF UPPER LIMB, INCLUDING SHOULDER, LEFT (H): ICD-10-CM

## 2025-01-28 DIAGNOSIS — I10 HTN, GOAL BELOW 140/90: Primary | ICD-10-CM

## 2025-01-28 DIAGNOSIS — E66.01 CLASS 2 SEVERE OBESITY DUE TO EXCESS CALORIES WITH SERIOUS COMORBIDITY AND BODY MASS INDEX (BMI) OF 35.0 TO 35.9 IN ADULT (H): ICD-10-CM

## 2025-01-28 PROCEDURE — G2211 COMPLEX E/M VISIT ADD ON: HCPCS | Performed by: INTERNAL MEDICINE

## 2025-01-28 PROCEDURE — 99214 OFFICE O/P EST MOD 30 MIN: CPT | Performed by: INTERNAL MEDICINE

## 2025-01-28 NOTE — PATIENT INSTRUCTIONS
Plan:  Hold the Vitamin D for 3 months. Then resume at half dose   2. Continue the other meds, same doses for now.  3. Bone Density Scan ( DEXA), to be done at [facility] -- call [phone] to schedule it   4.   Next ANNUAL EXAM after Dec 17, 2025  5. Please make a lab appointment for fasting labs  few days before  6. Make sure you drink plenty of water the day of the blood test.

## 2025-01-28 NOTE — PROGRESS NOTES
Dr Sanchez's note      Patient's instructions / PLAN:                                                        Plan:  Hold the Vitamin D for 3 months. Then resume at half dose   2. Continue the other meds, same doses for now.  3. Bone Density Scan ( DEXA), to be done at [facility] -- call [phone] to schedule it   4.   Next ANNUAL EXAM after Dec 17, 2025  5. Please make a lab appointment for fasting labs  few days before  6. Make sure you drink plenty of water the day of the blood test.          ASSESSMENT & PLAN:                                                      (I10) HTN, goal below 140/90  (primary encounter diagnosis)  Comment: Controlled    Plan: Continue same meds, same doses for now     (Z78.0) Asymptomatic postmenopausal status  Comment:   Plan: DX Bone Density            (E78.5) Hyperlipidemia with target LDL less than 130  Comment: Not controlled but side effects from other statin   Plan: as above     (F33.42) Recurrent major depressive disorder, in full remission  Comment: Controlled    Plan: Continue same meds, same doses for now     (C43.62) Malignant melanoma of R shoulder 2023  Comment: no nodes involvement   Plan: f/up w derm    (E66.812,  E66.01,  Z68.35) Class 2 severe obesity due to excess calories with serious comorbidity and body mass index (BMI) of 35.0 to 35.9 in adult (H)  Comment:   Plan:  we discussed about diet ( reducing calories intake) and increasing the exercise                   Chief complaint:                                                      Follow up chronic medical problems       SUBJECTIVE:                                                    History of present illness:    Labs Dec -- Discussed      Vit D elev    Cr mild elev  -- admits she doesn't drink enough fluids     HTN  -- Not controlled in Dec w metoprolol 200 mg daily, Lisinopril 40, Added Amlodipine 5.   -- home BP 120s/80     HLip  -- Not controlled  -- side effects from Atorvastatin    Subjective   Mckenzie  "is a 74 year old, presenting for the following health issues:  Recheck Medication (fasting)      1/28/2025     7:34 AM   Additional Questions   Roomed by Jaimee GONZALEZ     History of Present Illness       Reason for visit:  Followup from Dec appt with Dr. Valerio. She added a diuretic and wanted a followup to this.    She eats 2-3 servings of fruits and vegetables daily.She consumes 0 sweetened beverage(s) daily.She exercises with enough effort to increase her heart rate 10 to 19 minutes per day.  She exercises with enough effort to increase her heart rate 3 or less days per week.   She is taking medications regularly.       Review of Systems:                                                      ROS: negative for fever, chills, cough, wheezes, chest pain, shortness of breath, vomiting, abdominal pain, leg swelling       OBJECTIVE:             Physical exam:  Blood pressure 124/76, pulse 89, temperature 97.1  F (36.2  C), temperature source Tympanic, resp. rate 16, height 1.6 m (5' 3\"), weight 91.6 kg (202 lb), last menstrual period 10/24/2005, SpO2 95%, not currently breastfeeding.     NAD, appears comfortable  Skin: no rashes   Neck: supple, no JVD,  No thyroidmegaly. Lymph nodes nonpalpable cervical and supraclavicular.  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, including shoulder, left (H) 9/21/2023    MENOPAUSAL DISORDER NOS 8/10/2005    Menses getting much heavier, pelvic sono 02/06--2 uterine fibroids seen, 1.5 and 2.7cm each; simple left ovarian cyst 4.2 cm, 2.6 cm complex right ovarian cyst--endometrial biopsy benign    Symptomatic " menopausal or female climacteric states       PSHx: reviewed  Past Surgical History:   Procedure Laterality Date    BIOPSY NODE SENTINEL Right 10/16/2023    Procedure: RIGHT SENTINEL LYMPH NODE BIOPSY;  Surgeon: Roney Castrejon MD;  Location: [facility] OR    COLONOSCOPY  02/17/2004    wnl    COLONOSCOPY N/A 6/16/2016    Procedure: COLONOSCOPY;  Surgeon: Lloyd Gutierrez MD;  Location: [facility]    EXCISE LESION UPPER EXTREMITY Right 10/16/2023    Procedure: RIGHT ARM WIDE LOCAL EXCISION;  Surgeon: Roney Castrejon MD;  Location: [facility] OR    EXCISE MASS UPPER EXTREMITY Left 10/16/2023    Procedure: LEFT ARM WIDE LOCAL EXCISION;  Surgeon: Roney Castrejon MD;  Location: [facility] OR    HYSTERECTOMY, VAGINAL  4/18/06    Laparoscopic supracerv hyst-BSO        Meds: reviewed  Current Outpatient Medications   Medication Sig Dispense Refill    amLODIPine (NORVASC) 5 MG tablet Take 1 tablet (5 mg) by mouth daily. 90 tablet 3    ASPIRIN PO Take 81 mg by mouth daily      Cholecalciferol (VITAMIN D3) 50 MCG (2000 UT) CAPS       fish oil-omega-3 fatty acids 1000 MG capsule Take 1 capsule (1 g) by mouth 3 times daily 120 capsule 11    lisinopril (ZESTRIL) 40 MG tablet Take 1 tablet (40 mg) by mouth daily. 90 tablet 3    metoprolol succinate ER (TOPROL XL) 200 MG 24 hr tablet Take 1 tablet (200 mg) by mouth daily. 90 tablet 3    Multiple Vitamins-Minerals (MULTIVITAL PO) Take  by mouth.      pravastatin (PRAVACHOL) 20 MG tablet Take 1 tablet (20 mg) by mouth daily. 90 tablet 3    venlafaxine (EFFEXOR XR) 150 MG 24 hr capsule TAKE 1 CAPSULE EVERY DAY 90 capsule 3    hydroCHLOROthiazide 12.5 MG tablet Take 1 tablet (12.5 mg) by mouth daily. 90 tablet 1    ketorolac (ACULAR) 0.5 % ophthalmic solution       moxifloxacin (VIGAMOX) 0.5 % ophthalmic solution       prednisoLONE acetate (PRED FORTE) 1 % ophthalmic suspension          Soc Hx: reviewed  Fam Hx: reviewed      Chart documentation was completed, in part, with [employer] voice-recognition software.  Even though reviewed, some grammatical, spelling, and word errors may remain.    The longitudinal plan of care for the diagnosis(es)/condition(s) as documented were addressed during this visit. Due to the added complexity in care, I will continue to support Mckenzie in the subsequent management and with ongoing continuity of care.      Barb Sanchez MD  Internal Medicine       Signed Electronically by: Barb Emanuel MD

## 2025-03-17 ENCOUNTER — OFFICE VISIT (OUTPATIENT)
Dept: FAMILY MEDICINE | Facility: CLINIC | Age: 75
End: 2025-03-17
Payer: COMMERCIAL

## 2025-03-17 ENCOUNTER — NURSE TRIAGE (OUTPATIENT)
Dept: INTERNAL MEDICINE | Facility: CLINIC | Age: 75
End: 2025-03-17

## 2025-03-17 VITALS
BODY MASS INDEX: 34.63 KG/M2 | DIASTOLIC BLOOD PRESSURE: 78 MMHG | SYSTOLIC BLOOD PRESSURE: 120 MMHG | TEMPERATURE: 100.9 F | HEART RATE: 94 BPM | RESPIRATION RATE: 18 BRPM | OXYGEN SATURATION: 98 % | WEIGHT: 195.5 LBS

## 2025-03-17 DIAGNOSIS — R50.9 FEVER, UNSPECIFIED FEVER CAUSE: ICD-10-CM

## 2025-03-17 DIAGNOSIS — J06.9 VIRAL URI WITH COUGH: Primary | ICD-10-CM

## 2025-03-17 DIAGNOSIS — R05.1 ACUTE COUGH: ICD-10-CM

## 2025-03-17 PROCEDURE — 3078F DIAST BP <80 MM HG: CPT

## 2025-03-17 PROCEDURE — G2211 COMPLEX E/M VISIT ADD ON: HCPCS

## 2025-03-17 PROCEDURE — 99213 OFFICE O/P EST LOW 20 MIN: CPT

## 2025-03-17 PROCEDURE — 3074F SYST BP LT 130 MM HG: CPT

## 2025-03-17 RX ORDER — ALBUTEROL SULFATE 90 UG/1
1-2 INHALANT RESPIRATORY (INHALATION) EVERY 6 HOURS PRN
Qty: 18 G | Refills: 0 | Status: SHIPPED | OUTPATIENT
Start: 2025-03-17

## 2025-03-17 RX ORDER — BENZONATATE 100 MG/1
100 CAPSULE ORAL 3 TIMES DAILY PRN
Qty: 15 CAPSULE | Refills: 0 | Status: SHIPPED | OUTPATIENT
Start: 2025-03-17 | End: 2025-04-01

## 2025-03-17 RX ORDER — INHALER, ASSIST DEVICES
SPACER (EA) MISCELLANEOUS
Qty: 1 EACH | Refills: 0 | Status: SHIPPED | OUTPATIENT
Start: 2025-03-17

## 2025-03-17 RX ORDER — AZITHROMYCIN 250 MG/1
TABLET, FILM COATED ORAL
Qty: 6 TABLET | Refills: 0 | Status: SHIPPED | OUTPATIENT
Start: 2025-03-17 | End: 2025-03-22

## 2025-03-17 ASSESSMENT — ENCOUNTER SYMPTOMS: COUGH: 1

## 2025-03-17 NOTE — PROGRESS NOTES
Assessment & Plan     Viral URI with cough  Acute cough  Fever, unspecified fever cause  Vitals positive for low-grade fever, but O2 sats, pulse, and BP WNL. Lungs sounded clear. Likely viral URI at this time, but will send azithromycin to cover for an atypical pneumonia. Will treat cough with albuterol inhaler 1-2 puffs every 4-6 hours for cough, wheezing, and shortness of breath and tessalon Perles up to three times a day to suppress cough. Discussed using tessalon Perles especially at bedtime. Encouraged patient to take ibuprofen and tylenol every 4-6 hours as needed for myalgias, fevers, and headaches. Also encouraged patient drink plenty of fluids and electrolyte drinks and prioritize rest and getting good sleep.   - albuterol (PROAIR HFA/PROVENTIL HFA/VENTOLIN HFA) 108 (90 Base) MCG/ACT inhaler  Dispense: 18 g; Refill: 0  - benzonatate (TESSALON) 100 MG capsule  Dispense: 15 capsule; Refill: 0  - spacer (OPTICHAMBER PRIMO) holding chamber  Dispense: 1 each; Refill: 0  - azithromycin (ZITHROMAX) 250 MG tablet  Dispense: 6 tablet; Refill: 0    Patient should follow up in 1 week if symptoms do not improve or sooner for new or worsening symptoms. All questions answered to patient's satisfaction. Warning signs of when to seek emergency care were discussed.     Tejal Powell PA-C    Ponce Rincon is a 74 year old, presenting for the following health issues:  Cough        3/17/2025     3:52 PM   Additional Questions   Roomed by sonal chen   Accompanied by self     History of Present Illness       Reason for visit:  Cough, fatigue, low appetite, stomach sore  Symptom onset:  3-7 days ago  Symptoms include:  Cough, fatigue, low appetite, stomach sore  Symptom intensity:  Moderate  Symptom progression:  Worsening  Had these symptoms before:  No  What makes it worse:  Trying to eat.  What makes it better:  No   She is taking medications regularly.      Shanelle is a 74 year old female who presents today with cough  for the last 4-5 days.     Started with a dry cough- not productive. Very fatigued. Low fever at 100.9. She has a very mild sore throat. One epidose of nausea last night. No vomiting or diarrhea. Low appetitie. Has been having popsicles. No blood. No chest pains or SOB.     Patient's  was diagnosed with pneumonia and was admitted to the hospital over the weekend. Mckenzie has been with her  all weekend and they are both home again today.      Acute Illness  Acute illness concerns: cough   Onset/Duration: 4-5 days ago  Symptoms:  Fever: YES  Chills/Sweats: No  Headache (location?): No  Sinus Pressure: No  Conjunctivitis:  No  Ear Pain: no  Rhinorrhea: YES  Congestion: No  Sore Throat: YES  Cough: YES-non-productive  Wheeze: No  Decreased Appetite: YES  Nausea: YES  Vomiting: No  Diarrhea: No  Dysuria/Freq.: No  Dysuria or Hematuria: No  Fatigue/Achiness: YES  Sick/Strep Exposure: No  Therapies tried and outcome: None    Review of Systems  Constitutional, neuro, ENT, endocrine, pulmonary, cardiac, gastrointestinal, genitourinary, musculoskeletal, integument and psychiatric systems are negative, except as otherwise noted.    Past Medical History:   Diagnosis Date    Intramural leiomyoma of uterus     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, including shoulder, left (H) 9/21/2023    MENOPAUSAL DISORDER NOS 8/10/2005    Menses getting much heavier, pelvic sono 02/06--2 uterine fibroids seen, 1.5 and 2.7cm each; simple left ovarian cyst 4.2 cm, 2.6 cm complex right ovarian cyst--endometrial biopsy benign    Symptomatic menopausal or female climacteric states      Past Surgical History:   Procedure Laterality Date    BIOPSY NODE SENTINEL Right 10/16/2023    Procedure: RIGHT SENTINEL LYMPH NODE BIOPSY;  Surgeon: Roney Castrejon MD;  Location: [facility] OR    COLONOSCOPY  02/17/2004    wnl    COLONOSCOPY N/A 6/16/2016    Procedure: COLONOSCOPY;  Surgeon: Lloyd Gutierrez MD;  Location: [facility]    EXCISE LESION UPPER  EXTREMITY Right 10/16/2023    Procedure: RIGHT ARM WIDE LOCAL EXCISION;  Surgeon: Roney Castrejon MD;  Location: [facility] OR    EXCISE MASS UPPER EXTREMITY Left 10/16/2023    Procedure: LEFT ARM WIDE LOCAL EXCISION;  Surgeon: Roney Castrejon MD;  Location: [facility] OR    HYSTERECTOMY, VAGINAL  4/18/06    Laparoscopic supracerv hyst-BSO     Allergies   Allergen Reactions    Atorvastatin Other (See Comments)     Myalgia      Maxzide [Hydrochlorothiazide W-Triamterene]      gout         Objective    /78   Pulse 94   Temp 100.9  F (38.3  C) (Tympanic)   Resp 18   Wt 88.7 kg (195 lb 8 oz)   LMP 10/24/2005   SpO2 98%   BMI 34.63 kg/m    Body mass index is 34.63 kg/m .  Physical Exam   GENERAL: alert and no distress  EYES: Eyes grossly normal to inspection, PERRL and conjunctivae and sclerae normal  HENT: normal cephalic/atraumatic, ear canals and TM's normal, nose and mouth without ulcers or lesions, nasal mucosa edematous , rhinorrhea clear and yellow, oropharynx clear, and oral mucous membranes moist  NECK: no adenopathy, no asymmetry, masses, or scars  RESP: lungs clear to auscultation - no rales, rhonchi or wheezes  CV: regular rate and rhythm, normal S1 S2, no S3 or S4, no murmur, click or rub, no peripheral edema  MS: no gross musculoskeletal defects noted, no edema  SKIN: no suspicious lesions or rashes  PSYCH: mentation appears normal, affect normal/bright      Signed Electronically by: Tejal Powell PA-C

## 2025-03-17 NOTE — TELEPHONE ENCOUNTER
Influenza-Like Illness (LATHA) RN Standing Order (13+)    Shanelle DOWELL Derick      Age: 74 year old     YOB: 1950    Patient has been triaged using Epic triage guidelines: Patient does not need higher level of care     Has the patient been seen at a [facility] or [facility] (established in primary or specialty care) in the last two years? No, the patient is not eligible for LATHA Standing Order evaluation.  Recommend virtual or evisit (video preferred) or in office same or next day access    Additional educational resources include:  http://www.[employer]  http://www.cdc.gov/flu/  Merary To RN      Reason for Disposition   Influenza EXPOSURE (close contact) within the last 7 days and fever or any respiratory symptoms (i.e., cough, runny or stuffy nose, sore throat)   Patient wants to be seen    Additional Information   Negative: SEVERE difficulty breathing (e.g., struggling for each breath, speaks in single words)   Negative: Bluish (or gray) lips or face   Negative: Shock suspected (e.g., cold/pale/clammy skin, too weak to stand, low BP, rapid pulse)   Negative: Sounds like a life-threatening emergency to the triager   Negative: Symptoms of COVID-19 (e.g., cough, fever, SOB, or others) and COVID-19 is widespread in the community   Negative: Sounds like a cold and there is no fever   Negative: Cough and there is no fever   Negative: Severe cough   Negative: Throat pain and there is no fever   Negative: Severe sore throat   Negative: Influenza vaccine reaction is suspected   Negative: Headache and stiff neck (can't touch chin to chest)   Negative: Chest pain  (Exception: MILD central chest pain, present only when coughing.)   Negative: Difficulty breathing that is not severe and not relieved by cleaning out the nose   Negative: Patient sounds very sick or weak to the triager   Negative: Fever > 104 F (40 C)   Negative: Fever > 101 F (38.3 C) and over 60 years of age   Negative: Fever >  "100 F (37.8 C) and diabetes mellitus or weak immune system (e.g., HIV positive, cancer chemo, splenectomy, organ transplant, chronic steroids)   Negative: Fever > 100 F (37.8 C) and bedridden (e.g., CVA, chronic illness, recovering from surgery)   Negative: Using nasal washes and pain medicine > 24 hours and sinus pain (lower forehead, cheekbone, or eye) persists   Negative: Fever present > 3 days (72 hours)   Negative: Fever returns after gone for over 24 hours and symptoms worse (or not improved)   Negative: Earache    Answer Assessment - Initial Assessment Questions  1. TYPE of EXPOSURE: \"How were you exposed?\" (e.g., close contact, not a close contact)      Close contact   2. DATE of EXPOSURE: \"When did the exposure occur?\" (e.g., hour, days, weeks)      3/13/2025, 3/14/2025, 3/15/2025   3. PREGNANCY: \"Is there any chance you are pregnant?\" \"When was your last menstrual period?\"      NA   4. HIGH RISK for COMPLICATIONS: \"Do you have any heart or lung problems?\" \"Do you have a weakened immune system?\" (e.g., CHF, COPD, asthma, HIV positive, chemotherapy, renal failure, diabetes mellitus, sickle cell anemia)      None   5. SYMPTOMS: \"Do you have any symptoms?\" (e.g., cough, fever, sore throat, difficulty breathing).      Cough, low appetite, nauseated, slight body aches, congestion,      Denies: fevers, chills, sweats, CP, SOB    Answer Assessment - Initial Assessment Questions  1. WORST SYMPTOM: \"What is your worst symptom?\" (e.g., cough, runny nose, muscle aches, headache, sore throat, fever)       Cough and fatigue     2. ONSET: \"When did your flu symptoms start?\"       3/14/2025     3. COUGH: \"How bad is the cough?\"        Was wheezing with cough last night and its dry cough     4. RESPIRATORY DISTRESS: \"Describe your breathing.\"       It normal     5. FEVER: \"Do you have a fever?\" If Yes, ask: \"What is your temperature, how was it measured, and when did it start?\"      None     6. EXPOSURE: \"Were you exposed " "to someone with influenza?\"             7. FLU VACCINE: \"Did you get a flu shot this year?\"      Yes     8. HIGH RISK DISEASE: \"Do you have any chronic medical problems?\" (e.g., heart or lung disease, asthma, weak immune system, or other HIGH RISK conditions)      Obesity, HTN, high cholesterol     9. PREGNANCY: \"Is there any chance you are pregnant?\" \"When was your last menstrual period?\"      NA   10. OTHER SYMPTOMS: \"Do you have any other symptoms?\"  (e.g., runny nose, muscle aches, headache, sore throat)        Runny nose, slight body aches, nauseated  Denies: headaches, sore throat, vomiting, CP, SOB    Protocols used: Influenza (Flu) Exposure-A-OH, Influenza (Flu) - Seasonal-A-OH    "

## 2025-03-29 ENCOUNTER — HEALTH MAINTENANCE LETTER (OUTPATIENT)
Age: 75
End: 2025-03-29

## 2025-04-08 ENCOUNTER — OFFICE VISIT (OUTPATIENT)
Dept: FAMILY MEDICINE | Facility: CLINIC | Age: 75
End: 2025-04-08
Payer: COMMERCIAL

## 2025-04-08 VITALS
TEMPERATURE: 98.9 F | HEART RATE: 87 BPM | RESPIRATION RATE: 20 BRPM | OXYGEN SATURATION: 98 % | DIASTOLIC BLOOD PRESSURE: 72 MMHG | WEIGHT: 192.2 LBS | BODY MASS INDEX: 34.05 KG/M2 | SYSTOLIC BLOOD PRESSURE: 128 MMHG

## 2025-04-08 DIAGNOSIS — M79.671 RIGHT FOOT PAIN: Primary | ICD-10-CM

## 2025-04-08 LAB
ANION GAP SERPL CALCULATED.3IONS-SCNC: 15 MMOL/L (ref 7–15)
BUN SERPL-MCNC: 23.8 MG/DL (ref 8–23)
CALCIUM SERPL-MCNC: 9.8 MG/DL (ref 8.8–10.4)
CHLORIDE SERPL-SCNC: 104 MMOL/L (ref 98–107)
CREAT SERPL-MCNC: 0.82 MG/DL (ref 0.51–0.95)
EGFRCR SERPLBLD CKD-EPI 2021: 75 ML/MIN/1.73M2
ERYTHROCYTE [DISTWIDTH] IN BLOOD BY AUTOMATED COUNT: 12.3 % (ref 10–15)
GLUCOSE SERPL-MCNC: 96 MG/DL (ref 70–99)
HCO3 SERPL-SCNC: 25 MMOL/L (ref 22–29)
HCT VFR BLD AUTO: 35.3 % (ref 35–47)
HGB BLD-MCNC: 11.9 G/DL (ref 11.7–15.7)
MCH RBC QN AUTO: 31.2 PG (ref 26.5–33)
MCHC RBC AUTO-ENTMCNC: 33.7 G/DL (ref 31.5–36.5)
MCV RBC AUTO: 92 FL (ref 78–100)
PLATELET # BLD AUTO: 269 10E3/UL (ref 150–450)
POTASSIUM SERPL-SCNC: 4.7 MMOL/L (ref 3.4–5.3)
RBC # BLD AUTO: 3.82 10E6/UL (ref 3.8–5.2)
SODIUM SERPL-SCNC: 144 MMOL/L (ref 135–145)
URATE SERPL-MCNC: 6.6 MG/DL (ref 2.4–5.7)
WBC # BLD AUTO: 4.3 10E3/UL (ref 4–11)

## 2025-04-08 PROCEDURE — G2211 COMPLEX E/M VISIT ADD ON: HCPCS

## 2025-04-08 PROCEDURE — 36415 COLL VENOUS BLD VENIPUNCTURE: CPT

## 2025-04-08 PROCEDURE — 3078F DIAST BP <80 MM HG: CPT

## 2025-04-08 PROCEDURE — 85027 COMPLETE CBC AUTOMATED: CPT

## 2025-04-08 PROCEDURE — 80048 BASIC METABOLIC PNL TOTAL CA: CPT

## 2025-04-08 PROCEDURE — 99213 OFFICE O/P EST LOW 20 MIN: CPT

## 2025-04-08 PROCEDURE — 3074F SYST BP LT 130 MM HG: CPT

## 2025-04-08 PROCEDURE — 84550 ASSAY OF BLOOD/URIC ACID: CPT

## 2025-04-08 RX ORDER — PREDNISONE 20 MG/1
TABLET ORAL
Qty: 20 TABLET | Refills: 0 | Status: SHIPPED | OUTPATIENT
Start: 2025-04-08

## 2025-04-08 NOTE — PROGRESS NOTES
Assessment & Plan     Right foot pain  History and exam is somewhat consistent with gout flare, but differentials include lymphedema or arthritis. During her last gout flare uric acid levels were elevated, so we will check these today. Also checking cbc and bmp. Most recent labs showed mildly decreased kidney function with GFR at 60 and creatinine at 0.99. Will treat with prednisone taper vs NSAID. Recommended resting and elevating her foot, applying heat and ice to the area, and wearing compression stockings as tolerated.   - Uric acid  - CBC with platelets  - Basic metabolic panel  (Ca, Cl, CO2, Creat, Gluc, K, Na, BUN)  - predniSONE (DELTASONE) 20 MG tablet  Dispense: 20 tablet; Refill: 0    Patient should follow up in 1-2 weeks if symptoms do not improve or sooner for new or worsening symptoms. All questions answered to patient's satisfaction. Warning signs of when to seek emergency care were discussed.     Tejal Powell PA-C      Ponce Rincon is a 74 year old, presenting for the following health issues:  Arthritis (gout)        4/8/2025    10:12 AM   Additional Questions   Roomed by sonal chen   Accompanied by self     History of Present Illness       Reason for visit:  Gout  Symptom onset:  3-7 days ago  Symptoms include:  Sore right foot/ankle  Symptom intensity:  Severe  Symptom progression:  Staying the same  Had these symptoms before:  Yes  Has tried/received treatment for these symptoms:  Yes  Previous treatment was successful:  Yes  Prior treatment description:  Meds  What makes it worse:  Not sure  What makes it better:  Icing the affected area   She is taking medications regularly.      Shanelle is a 74 year old female who presents today with right foot pain.     Symptoms started on Friday. She has had one episode of gout in the past and this feels somewhat similar but not quite as severe. Pain is from her right ankle down to her right great toe. Her right foot is swollen and it is painful to put  weight on it. No recent falls or injuries. No calf pain.     Review of Systems  Constitutional, neuro, ENT, endocrine, pulmonary, cardiac, gastrointestinal, genitourinary, musculoskeletal, integument and psychiatric systems are negative, except as otherwise noted.    Past Medical History:   Diagnosis Date    Intramural leiomyoma of uterus     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, including shoulder, left (H) 9/21/2023    MENOPAUSAL DISORDER NOS 8/10/2005    Menses getting much heavier, pelvic sono 02/06--2 uterine fibroids seen, 1.5 and 2.7cm each; simple left ovarian cyst 4.2 cm, 2.6 cm complex right ovarian cyst--endometrial biopsy benign    Symptomatic menopausal or female climacteric states      Past Surgical History:   Procedure Laterality Date    BIOPSY NODE SENTINEL Right 10/16/2023    Procedure: RIGHT SENTINEL LYMPH NODE BIOPSY;  Surgeon: Roney Castrejon MD;  Location: [facility] OR    COLONOSCOPY  02/17/2004    wnl    COLONOSCOPY N/A 6/16/2016    Procedure: COLONOSCOPY;  Surgeon: Lloyd Gutierrez MD;  Location: [facility]    EXCISE LESION UPPER EXTREMITY Right 10/16/2023    Procedure: RIGHT ARM WIDE LOCAL EXCISION;  Surgeon: Roney Castrejon MD;  [place] [facility]    EXCISE MASS UPPER EXTREMITY Left 10/16/2023    Procedure: LEFT ARM WIDE LOCAL EXCISION;  Surgeon: Roney Castrejon MD;  Location: [facility] OR    HYSTERECTOMY, VAGINAL  4/18/06    Laparoscopic supracerv hyst-BSO     Allergies   Allergen Reactions    Atorvastatin Other (See Comments)     Myalgia      Maxzide [Hydrochlorothiazide W-Triamterene]      gout       Objective    /72   Pulse 87   Temp 98.9  F (37.2  C) (Tympanic)   Resp 20   Wt 87.2 kg (192 lb 3.2 oz)   LMP 10/24/2005   SpO2 98%   BMI 34.05 kg/m    Body mass index is 34.05 kg/m .  Physical Exam   GENERAL: alert and no distress  RESP: lungs clear to auscultation - no rales, rhonchi or wheezes  CV: regular rate and rhythm, normal S1 S2, no S3 or S4, no murmur, click or rub, no peripheral  edema  MS: 1+ edema to right foot extending to ankle, peripheral pulses normal, tenderness to palpation of right great toe and top of right foot, and RLE exam shows ROM of all joints is normal  SKIN: no suspicious lesions or rashes  NEURO: Normal strength and tone, mentation intact and speech normal  PSYCH: mentation appears normal, affect normal/bright      Signed Electronically by: Tejal Powell PA-C

## 2025-05-20 ENCOUNTER — OFFICE VISIT (OUTPATIENT)
Dept: DERMATOLOGY | Facility: CLINIC | Age: 75
End: 2025-05-20
Payer: COMMERCIAL

## 2025-05-20 DIAGNOSIS — D22.9 MULTIPLE BENIGN NEVI: ICD-10-CM

## 2025-05-20 DIAGNOSIS — Z86.018 HISTORY OF DYSPLASTIC NEVUS: ICD-10-CM

## 2025-05-20 DIAGNOSIS — L82.1 SEBORRHEIC KERATOSES: ICD-10-CM

## 2025-05-20 DIAGNOSIS — D18.01 CHERRY ANGIOMA: ICD-10-CM

## 2025-05-20 DIAGNOSIS — L81.4 SOLAR LENTIGO: ICD-10-CM

## 2025-05-20 DIAGNOSIS — Z85.820 HX OF MALIGNANT MELANOMA: Primary | ICD-10-CM

## 2025-05-20 DIAGNOSIS — Z12.83 SKIN CANCER SCREENING: ICD-10-CM

## 2025-05-20 ASSESSMENT — PAIN SCALES - GENERAL: PAINLEVEL_OUTOF10: NO PAIN (0)

## 2025-05-20 NOTE — PROGRESS NOTES
[facility] Dermatology Note  Encounter Date: May 20, 2025  Office Visit      Dermatology Problem List:  FBSE 05/20/2025  1. 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  - Recommended follow up is 3 months    # Hx of DN  - Signs and Symptoms of non-melanoma skin cancer and ABCDEs of melanoma reviewed with patient. Patient encouraged to perform monthly self skin exams and educated on how to perform them. UV precautions reviewed with patient. Patient was asked about new or changing moles/lesions on body.     # Skin cancer screening with multiple benign nevi, solar lentigines  - ABCDEs: Counseled ABCDEs of melanoma: Asymmetry, Border (irregularity), Color (not uniform, changes in color), Diameter (greater than 6 mm which is about the size of a pencil eraser), and Evolving (any changes in preexisting moles).  - Sun protection: Counseled SPF30+ sunscreen, UPF clothing, sun avoidance, tanning bed avoidance.    # Cherry angiomas and seborrheic keratoses,  Benign, reassurance given.       Procedures Performed:   None.    Follow-up: 3 month(s) in-person, or earlier for new or changing lesions    Staff:   Scribe Disclosure:   By signing my name below, Natasha PELLETIER, attest that this documentation has been prepared under the direction and in the presence of Aline Teague PA-C.  - Electronically  Signed: Natasha Bruno 05/20/25       Provider Disclosure:   The documentation recorded by the scribe accurately reflects the services I personally performed and the decisions made by me.    All risks, benefits and alternatives were discussed with patient.  Patient is in agreement and understands the assessment and plan.  All questions were answered.  Sun Screen Education was given.   Return to Clinic in 3 months or sooner as needed.   Aline Teague PA-C   [facility]      ____________________________________________    CC: Skin Check (Rough spot on back /H/O Melanoma )        HPI:  Ms. Shanelle Sepulveda is a 74 year old female who presents today as a return patient for lesion of concern. She notices a rough spot on the lower back.     She denies any spots that are painful, bleeding, itchy or changing.   -Denies unexplained weight loss, fevers, arthralgias, myalgias, swollen glands or night sweats.      Patient is otherwise feeling well, without additional concerns.    Labs:  none    Physical exam:  Vitals: LMP 10/24/2005   GEN: This is a well developed, well-nourished female in no acute distress, in a pleasant mood.    SKIN: Full skin, which includes the head/face, both arms, chest, back, abdomen,both legs, genitalia and/or groin buttocks, digits and/or nails, was examined.  - linear scars in previous MM sites on back and arms   - There is no lymphadenopathy on palpation of cervical, post auricular, occipital, axillary, inguinal, and popliteal nodes.  - There are dome shaped bright red papules on the head/neck, trunk, extremities.   - Multiple regular brown pigmented macules and papules are identified on the head/neck, trunk, extremities.   - Scattered brown macules on sun exposed areas.  - There are waxy stuck on tan to brown papules on the head/neck, trunk, extremities (including the lower back).  - No other lesions of concern on areas examined.       Medications:  Current  Outpatient Medications   Medication Sig Dispense Refill    amLODIPine (NORVASC) 5 MG tablet Take 1 tablet (5 mg) by mouth daily. 90 tablet 3    ASPIRIN PO Take 81 mg by mouth daily      fish oil-omega-3 fatty acids 1000 MG capsule Take 1 capsule (1 g) by mouth 3 times daily (Patient not taking: Reported on 5/20/2025) 120 capsule 11    lisinopril (ZESTRIL) 40 MG tablet Take 1 tablet (40 mg) by mouth daily. 90 tablet 3    metoprolol succinate ER (TOPROL XL) 200 MG 24 hr tablet Take 1 tablet (200 mg) by mouth daily. 90 tablet 3    Multiple Vitamins-Minerals (MULTIVITAL PO) Take  by mouth. (Patient not taking: Reported on 5/20/2025)      pravastatin (PRAVACHOL) 20 MG tablet Take 1 tablet (20 mg) by mouth daily. 90 tablet 3    predniSONE (DELTASONE) 20 MG tablet Take 3 tabs by mouth daily x 3 days, then 2 tabs daily x 3 days, then 1 tab daily x 3 days, then 1/2 tab daily x 3 days. (Patient not taking: Reported on 5/20/2025) 20 tablet 0    venlafaxine (EFFEXOR XR) 150 MG 24 hr capsule TAKE 1 CAPSULE EVERY DAY 90 capsule 3     No current facility-administered medications for this visit.      Past Medical/Surgical History:   Patient Active Problem List   Diagnosis    Generalized anxiety disorder    Overactive bladder    Atrophic vaginitis    Hyperlipidemia with target LDL less than 130    HTN, goal below 140/90    Urgency-frequency syndrome    Family history of malignant neoplasm of breast    Cervical cancer screening    Major depression in complete remission    Malignant melanoma of R shoulder 2023    Class 2 severe obesity due to excess calories with serious comorbidity in adult (H)    CHEK2 gene mutation positive     Past Medical History:   Diagnosis Date    Intramural leiomyoma of uterus     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, including shoulder, left (H) 9/21/2023    MENOPAUSAL DISORDER NOS 8/10/2005    Menses getting much heavier, pelvic sono 02/06--2 uterine fibroids seen, 1.5 and 2.7cm each; simple left  ovarian cyst 4.2 cm, 2.6 cm complex right ovarian cyst--endometrial biopsy benign    Symptomatic menopausal or female climacteric states

## 2025-05-20 NOTE — LETTER
5/20/2025      Shanelle Sepulveda  [address] [facility] [address]      Dear Colleague,    Thank you for referring your patient, Shanelle Sepulveda, to the [facility] [place]. Please see a copy of my visit note below.    [facility] Dermatology Note  Encounter Date: May 20, 2025  Office Visit      Dermatology Problem List:  FBSE 05/20/2025  1. 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  - Recommended follow up is 3 months    # Hx of DN  - Signs and Symptoms of non-melanoma skin cancer and ABCDEs of melanoma reviewed with patient. Patient encouraged to perform monthly self skin exams and educated on how to perform them. UV precautions reviewed with patient. Patient was asked about new or changing moles/lesions on body.     # Skin cancer screening with multiple benign nevi, solar lentigines  - ABCDEs: Counseled ABCDEs of melanoma: Asymmetry, Border (irregularity), Color (not uniform, changes in color), Diameter (greater than 6 mm which is about the size of a pencil eraser), and Evolving (any changes in preexisting moles).  - Sun protection: Counseled SPF30+ sunscreen, UPF clothing, sun avoidance, tanning bed avoidance.    # Cherry angiomas and seborrheic keratoses,  Benign, reassurance given.       Procedures Performed:   None.    Follow-up: 3 month(s) in-person, or  earlier for new or changing lesions    Staff:   Scribe Disclosure:   By signing my name below, I, Natasha Cadenamonicathi, attest that this documentation has been prepared under the direction and in the presence of Aline Teague PA-C.  - Electronically Signed: Natasha Bruno 05/20/25       Provider Disclosure:   The documentation recorded by the scribe accurately reflects the services I personally performed and the decisions made by me.    All risks, benefits and alternatives were discussed with patient.  Patient is in agreement and understands the assessment and plan.  All questions were answered.  Sun Screen Education was given.   Return to Clinic in 3 months or sooner as needed.   Aline Teague PA-C   [facility]      ____________________________________________    CC: Skin Check (Rough spot on back /H/O Melanoma )        HPI:  Ms. Shanelle Sepulveda is a 74 year old female who presents today as a return patient for lesion of concern. She notices a rough spot on the lower back.     She denies any spots that are painful, bleeding, itchy or changing.   -Denies unexplained weight loss, fevers, arthralgias, myalgias, swollen glands or night sweats.      Patient is otherwise feeling well, without additional concerns.    Labs:  none    Physical exam:  Vitals: LMP 10/24/2005   GEN: This is a well developed, well-nourished female in no acute distress, in a pleasant mood.    SKIN: Full skin, which includes the head/face, both arms, chest, back, abdomen,both legs, genitalia and/or groin buttocks, digits and/or nails, was examined.  - linear scars in previous MM sites on back and arms   - There is no lymphadenopathy on palpation of cervical, post auricular, occipital, axillary, inguinal, and popliteal nodes.  - There are dome shaped bright red papules on the head/neck, trunk, extremities.   - Multiple regular brown pigmented macules and papules are identified on the head/neck, trunk, extremities.    - Scattered brown macules on sun exposed areas.  - There are waxy stuck on tan to brown papules on the head/neck, trunk, extremities (including the lower back).  - No other lesions of concern on areas examined.       Medications:  Current Outpatient Medications   Medication Sig Dispense Refill     amLODIPine (NORVASC) 5 MG tablet Take 1 tablet (5 mg) by mouth daily. 90 tablet 3     ASPIRIN PO Take 81 mg by mouth daily       fish oil-omega-3 fatty acids 1000 MG capsule Take 1 capsule (1 g) by mouth 3 times daily (Patient not taking: Reported on 5/20/2025) 120 capsule 11     lisinopril (ZESTRIL) 40 MG tablet Take 1 tablet (40 mg) by mouth daily. 90 tablet 3     metoprolol succinate ER (TOPROL XL) 200 MG 24 hr tablet Take 1 tablet (200 mg) by mouth daily. 90 tablet 3     Multiple Vitamins-Minerals (MULTIVITAL PO) Take  by mouth. (Patient not taking: Reported on 5/20/2025)       pravastatin (PRAVACHOL) 20 MG tablet Take 1 tablet (20 mg) by mouth daily. 90 tablet 3     predniSONE (DELTASONE) 20 MG tablet Take 3 tabs by mouth daily x 3 days, then 2 tabs daily x 3 days, then 1 tab daily x 3 days, then 1/2 tab daily x 3 days. (Patient not taking: Reported on 5/20/2025) 20 tablet 0     venlafaxine (EFFEXOR XR) 150 MG 24 hr capsule TAKE 1 CAPSULE EVERY DAY 90 capsule 3     No current facility-administered medications for this visit.      Past Medical/Surgical History:   Patient Active Problem List   Diagnosis     Generalized anxiety disorder     Overactive bladder     Atrophic vaginitis     Hyperlipidemia with target LDL less than 130     HTN, goal below 140/90     Urgency-frequency syndrome     Family history of malignant neoplasm of breast     Cervical cancer screening     Major depression in complete remission     Malignant melanoma of R shoulder 2023     Class 2 severe obesity due to excess calories with serious comorbidity in adult (H)     CHEK2 gene mutation positive     Past Medical History:   Diagnosis Date      Intramural leiomyoma of uterus      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, including shoulder, left (H) 9/21/2023     MENOPAUSAL DISORDER NOS 8/10/2005    Menses getting much heavier, pelvic sono 02/06--2 uterine fibroids seen, 1.5 and 2.7cm each; simple left ovarian cyst 4.2 cm, 2.6 cm complex right ovarian cyst--endometrial biopsy benign     Symptomatic menopausal or female climacteric states                         Again, thank you for allowing me to participate in the care of your patient.        Sincerely,        Aline Teague PA-C    Electronically signed

## 2025-05-20 NOTE — PATIENT INSTRUCTIONS

## 2025-05-30 ASSESSMENT — SLEEP AND FATIGUE QUESTIONNAIRES
HOW LIKELY ARE YOU TO NOD OFF OR FALL ASLEEP WHILE LYING DOWN TO REST IN THE AFTERNOON WHEN CIRCUMSTANCES PERMIT: SLIGHT CHANCE OF DOZING
HOW LIKELY ARE YOU TO NOD OFF OR FALL ASLEEP WHILE SITTING INACTIVE IN A PUBLIC PLACE: SLIGHT CHANCE OF DOZING
HOW LIKELY ARE YOU TO NOD OFF OR FALL ASLEEP WHILE SITTING AND TALKING TO SOMEONE: WOULD NEVER DOZE
HOW LIKELY ARE YOU TO NOD OFF OR FALL ASLEEP WHILE WATCHING TV: SLIGHT CHANCE OF DOZING
HOW LIKELY ARE YOU TO NOD OFF OR FALL ASLEEP IN A CAR, WHILE STOPPED FOR A FEW MINUTES IN TRAFFIC: WOULD NEVER DOZE
HOW LIKELY ARE YOU TO NOD OFF OR FALL ASLEEP WHILE SITTING AND READING: WOULD NEVER DOZE
HOW LIKELY ARE YOU TO NOD OFF OR FALL ASLEEP WHILE SITTING QUIETLY AFTER LUNCH WITHOUT ALCOHOL: WOULD NEVER DOZE
HOW LIKELY ARE YOU TO NOD OFF OR FALL ASLEEP WHEN YOU ARE A PASSENGER IN A CAR FOR AN HOUR WITHOUT A BREAK: WOULD NEVER DOZE

## 2025-06-04 ENCOUNTER — OFFICE VISIT (OUTPATIENT)
Dept: SLEEP MEDICINE | Facility: CLINIC | Age: 75
End: 2025-06-04
Attending: INTERNAL MEDICINE
Payer: COMMERCIAL

## 2025-06-04 VITALS
OXYGEN SATURATION: 97 % | BODY MASS INDEX: 32.44 KG/M2 | HEIGHT: 64 IN | WEIGHT: 190 LBS | SYSTOLIC BLOOD PRESSURE: 162 MMHG | HEART RATE: 62 BPM | DIASTOLIC BLOOD PRESSURE: 86 MMHG

## 2025-06-04 DIAGNOSIS — I10 HTN, GOAL BELOW 140/90: ICD-10-CM

## 2025-06-04 DIAGNOSIS — E66.9 OBESITY (BMI 30-39.9): ICD-10-CM

## 2025-06-04 DIAGNOSIS — R06.83 SNORING: Primary | ICD-10-CM

## 2025-06-04 DIAGNOSIS — G47.00 INSOMNIA, UNSPECIFIED TYPE: ICD-10-CM

## 2025-06-04 PROCEDURE — 99204 OFFICE O/P NEW MOD 45 MIN: CPT | Performed by: PHYSICIAN ASSISTANT

## 2025-06-04 PROCEDURE — 3077F SYST BP >= 140 MM HG: CPT | Performed by: PHYSICIAN ASSISTANT

## 2025-06-04 PROCEDURE — 3079F DIAST BP 80-89 MM HG: CPT | Performed by: PHYSICIAN ASSISTANT

## 2025-06-04 NOTE — PROGRESS NOTES
Outpatient Sleep Medicine Consultation:      Name: Shanelle Sepulveda MRN# [id number]   Age: 74 year old YOB: 1950     Date of Consultation: June 4, 2025  Consultation is requested by: Anna Valerio MD  [address] [address] Anna Valerio  Primary care provider: Barb Emanuel       Reason for Sleep Consult:     Shanelle Sepulveda is sent by Anna Valerio for a sleep consultation regarding 1. Excessive sleepiness    Patient s Reason for visit  Shanelle Sepulveda main reason for visit: (Patient-Rptd) Difficulty falling asleep.  Patient states problem(s) started: (Patient-Rptd) Approximately 3 yrs ago.  Shanelle Sepulveda's goals for this visit: (Patient-Rptd) Help to lessen how long it takes to fall asleep.         Assessment and Plan:     1. Snoring (Primary)  2. Insomnia, unspecified type  3. Obesity (BMI 30-39.9)  4. HTN, goal below 140/90    Patient presents to clinic today for evaluation of occasional insomnia and unrefreshing sleep quality.  1 or 2 nights a week it can take 1-2 hours to fall asleep but usually around 30 minutes.  Admits she is not always sleepy when she enters bed, discussed avoidance of sleep waiting and to get up if it is taking more than a half an hour to fall asleep, do something low stimulating until she is ready for bed. Would rather have her wait until she is sleepy to enter bed to assist with sleep latency.  Discussed the importance of keeping a consistent sleep schedule all days of the week.  She does spend excessive time in bed which could be contributing, will start waking up at 7 AM daily to help consolidate sleep and shorten sleep latency.  She usually wakes 2-3 times a night to use the restroom but denies any difficulties returning to sleep.  She has longstanding history of snoring that can be loud and disturb her husbands sleep but no witnessed apneic events or any gasping or choking arousals.  Discussed her  snoring, frequent nighttime urination, unrefreshing sleep, obesity, age over 50, postmenopausal all increase risk of something like sleep apnea that could also be contributing to her unrefreshing sleep.  Discussed pathophysiology of JOSEMANUEL and option of pursuing sleep study to evaluate and she is open to this, would prefer home sleep study over in lab assessment and HST orders placed today.  Discussed with patient I am not positive her insurance will cover but we will see what they say, if not covered can switch to PSG.  Insufficient time today to discuss treatment options in detail so we will see her back shortly following study to discuss results and potential treatment options.  Education materials provided in AVS.  Blood pressure elevated today, she will keep an eye on this at home and discuss with PCP if continued elevation.  - HST-Home Sleep Apnea Test - Noxturnal Returnable; Future         History of Present Illness:     Shanelle Sepulveda is a 74-year-old female with obesity, MDD, CHONG, HTN who presents to clinic today after referral from PCP for evaluation of daytime sleepiness.     SLEEP-WAKE SCHEDULE:     Work/School Days: Patient goes to school/work: (Patient-Rptd) No   Usually gets into bed at (Patient-Rptd) 9-10pm   Takes patient about 30 minutes to fall asleep usually but sometimes can be 1-2 hours   Has trouble falling asleep (Patient-Rptd) 2 of 7 days during a weeks time.  Wakes up in the middle of the night (Patient-Rptd) 3x as an average due to needing to go to the bathroom   Wakes up due to (Patient-Rptd) Use the bathroom  She has trouble falling back asleep (Patient-Rptd) 2x/week.   It usually takes (Patient-Rptd) 5 minutes. to get back to sleep  Patient is usually up at (Patient-Rptd) 7-9am  Uses alarm: (Patient-Rptd) No    Weekends/Non-work Days/All Other Days:  Usually gets into bed at (Patient-Rptd) 9-10pm   Takes patient about (Patient-Rptd) Anywhere from 30 minutes to 2 hours. to fall  "asleep  Patient is usually up at (Patient-Rptd) 7-9am.  Uses alarm: (Patient-Rptd) No    Sleep Need  Patient estimates she gets (Patient-Rptd) 8-10 hours. sleep on average   Patient thinks she needs about (Patient-Rptd) 9-10 but wish it was 8. sleep    Shanelle Sepulveda prefers to sleep in this position(s): (Patient-Rptd) Side     Naps  Patient takes a purposeful nap (Patient-Rptd) I don't.   She dozes off unintentionally (Patient-Rptd) I don't.  Patient has had a driving accident or near-miss due to sleepiness/drowsiness: (Patient-Rptd) No  She had a total [place] Sleepiness Scale of (Patient-Rptd) 3 (05/30/25 1903), with scores of 10 or higher indicating abnormal levels of sleepiness.     SLEEP DISRUPTIONS:    Breathing/Snoring  Patient snores:(Patient-Rptd) Yes for past 50 years, \"pretty steady\"  Other people complain about her snoring: (Patient-Rptd) Yes  Patient has been told she stops breathing in her sleep:(Patient-Rptd) No  Gasping/choking: No   She has issues with the following: (Patient-Rptd) Getting up to urinate more than once  Denies morning headache, morning nasal congestion, morning dry mouth, nocturnal GERD    Movement:  Patient gets pain, discomfort, with an urge to move:  (Patient-Rptd) Yes - \"I will have a sore shoulder once in no RLS. a while it's age related I think it's not all the time but I have to flip flop\" no RLS.  It happens when she is resting:  (Patient-Rptd) No  It happens more at night:  (Patient-Rptd) Yes  Patient has been told she kicks her legs at night:  (Patient-Rptd) No     Behaviors in Sleep:  Denies sleepwalking, sleep talking, sleep eating, bruxism, dream enactment, recurring nightmares, night terrors, sleep paralysis, hypnagogic/hypnopompic hallucinations, cataplexy      Is there anything else you would like your sleep provider to know: (Patient-Rptd) If sore shoulder as an example, I will flip to other side.      CAFFEINE AND OTHER SUBSTANCES:    Patient consumes caffeinated " beverages per day:  (Patient-Rptd) 1 cup coffee.  Last caffeine use is usually: (Patient-Rptd) 11am.  List of any prescribed or over the counter stimulants that patient takes:    List of any prescribed or over the counter sleep medication patient takes: (Patient-Rptd) Natrol. (Melatonin)  List of previous sleep medications that patient has tried: (Patient-Rptd) 0.  Patient drinks alcohol to help them sleep: (Patient-Rptd) No  Patient drinks alcohol near bedtime: (Patient-Rptd) No    Family History:  Patient has a family member been diagnosed with a sleep disorder: (Patient-Rptd) No            SCALES:    EPWORTH SLEEPINESS SCALE         5/30/2025     7:03 PM    Garrison Sleepiness Scale ( MARCELLUS Aleman  [id number]<br>ESS - [place]/English - Final version - 21 Nov 07 - [facility] [place].)   Sitting and reading Would never doze   Watching TV Slight chance of dozing   Sitting, inactive in a public place (e.g. a theatre or a meeting) Slight chance of dozing   As a passenger in a car for an hour without a break Would never doze   Lying down to rest in the afternoon when circumstances permit Slight chance of dozing   Sitting and talking to someone Would never doze   Sitting quietly after a lunch without alcohol Would never doze   In a car, while stopped for a few minutes in traffic Would never doze   Garrison Score (MC) 3   Garrison Score (Sleep) 3        Patient-reported           INSOMNIA SEVERITY INDEX (ELIZABETH)          5/30/2025     9:06 AM   Insomnia Severity Index (ELIZABETH)   Difficulty falling asleep 3   Difficulty staying asleep 0   Problems waking up too early 0   How SATISFIED/DISSATISFIED are you with your CURRENT sleep pattern? 2   How NOTICEABLE to others do you think your sleep problem is in terms of impairing the quality of your life? 1   How WORRIED/DISTRESSED are you about your current sleep problem? 2   To what extent do you consider your sleep problem to INTERFERE with your daily functioning (e.g. daytime fatigue,  mood, ability to function at work/daily chores, concentration, memory, mood, etc.) CURRENTLY? 2   ELIZABETH Total Score 10        Patient-reported         Guidelines for Scoring/Interpretation:  Total score categories:  0-7 = No clinically significant insomnia   8-14 = Subthreshold insomnia   15-21 = Clinical insomnia (moderate severity)  22-28 = Clinical insomnia (severe)  Used via courtesy of www.[employer].va.gov with permission from Pro Diggs PhD., [facility]      PATIENT HEALTH QUESTIONNAIRE-9 (PHQ - 9)        1/27/2025     9:47 AM   PHQ-9 ([employer])   1.  Little interest or pleasure in doing things 0   2.  Feeling down, depressed, or hopeless 0   3.  Trouble falling or staying asleep, or sleeping too much 1   4.  Feeling tired or having little energy 1   5.  Poor appetite or overeating 0   6.  Feeling bad about yourself - or that you are a failure or have let yourself or your family down 0   7.  Trouble concentrating on things, such as reading the newspaper or watching television 0   8.  Moving or speaking so slowly that other people could have noticed. Or the opposite - being so fidgety or restless that you have been moving around a lot more than usual 0   9.  Thoughts that you would be better off dead, or of hurting yourself in some way 0   PHQ-9 Total Score 2    6.  Feeling bad about yourself 0   7.  Trouble concentrating 0   8.  Moving slowly or restless 0   9.  Suicidal or self-harm thoughts 0   1.  Little interest or pleasure in doing things Not at all   2.  Feeling down, depressed, or hopeless Not at all   3.  Trouble falling or staying asleep, or sleeping too much Several days   4.  Feeling tired or having little energy Several days   5.  Poor appetite or overeating Not at all   6.  Feeling bad about yourself Not at all   7.  Trouble concentrating Not at all   8.  Moving slowly or restless Not at all   9.  Suicidal or self-harm thoughts Not at all   PHQ-9 via [place] TOTAL SCORE-----> 2 (Minimal  depression)   Difficulty at work, home, or with people Not difficult at all       Patient-reported         Developed by Bird Abbott, Clarissa Alba, Eder Meng and colleagues, with an educational regina from [employer]. No permission required to reproduce, translate, display or distribute.        Allergies:    Allergies   Allergen Reactions    Atorvastatin Other (See Comments)     Myalgia      Maxzide [Hydrochlorothiazide-Triamterene]      gout       Medications:    Current Outpatient Medications   Medication Sig Dispense Refill    amLODIPine (NORVASC) 5 MG tablet Take 1 tablet (5 mg) by mouth daily. 90 tablet 3    ASPIRIN PO Take 81 mg by mouth daily      lisinopril (ZESTRIL) 40 MG tablet Take 1 tablet (40 mg) by mouth daily. 90 tablet 3    metoprolol succinate ER (TOPROL XL) 200 MG 24 hr tablet Take 1 tablet (200 mg) by mouth daily. 90 tablet 3    pravastatin (PRAVACHOL) 20 MG tablet Take 1 tablet (20 mg) by mouth daily. 90 tablet 3    venlafaxine (EFFEXOR XR) 150 MG 24 hr capsule TAKE 1 CAPSULE EVERY DAY 90 capsule 3    VITAMIN D PO Take by mouth.         Problem List:  Patient Active Problem List    Diagnosis Date Noted    CHEK2 gene mutation positive 09/29/2024     Priority: Medium    Class 2 severe obesity due to excess calories with serious comorbidity in adult (H) 06/05/2024     Priority: Medium    Malignant melanoma of R shoulder 2023 09/21/2023     Priority: Medium    Major depression in complete remission 01/18/2022     Priority: Medium    Cervical cancer screening 01/09/2018     Priority: Medium     Criteria necessary to stop screening per ASCCP guidelines:  Older than 65 years  Three consecutive negative cytology results or two consecutive negative cotest results within the previous 10 years, with the most recent being performed within the last 5 years.   (Women with hx of EMMETT 2 or 3, or adenocarcinoma in situ should continue screening for full 20 years, even if this goes past age  65).    Lab Results   Component Value Date    PAP NIL / neg HPV 01/02/2018    PAP NIL 04/22/2013    PAP NIL 12/03/2010 1/2018: HM updated, Pap screening discontinued/ck      Family history of malignant neoplasm of breast 12/08/2015     Priority: Medium    Urgency-frequency syndrome 06/19/2015     Priority: Medium     2011: urinary urgency, frquency and leakage. She has to void every 2 hours, seems to produce normal volumes and feels she empties well; she leakage with stress type, small amounts generally; she also has nocturia, every 2 hours. She has vaginal dryness; given vaginal estrogen cream and Ditropan XL      HTN, goal below 140/90 03/19/2014     Priority: Medium    Hyperlipidemia with target LDL less than 130 02/12/2014     Priority: Medium     Diagnosis updated by automated process. Provider to review and confirm.      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, including shoulder, left (H) 9/21/2023    MENOPAUSAL DISORDER NOS 8/10/2005    Menses getting much heavier, pelvic sono 02/06--2 uterine fibroids seen, 1.5 and 2.7cm each; simple left ovarian cyst 4.2 cm, 2.6 cm complex right ovarian cyst--endometrial biopsy benign    Symptomatic menopausal or female climacteric states      Past Surgical History:   Procedure Laterality Date    BIOPSY NODE SENTINEL Right 10/16/2023    Procedure: RIGHT SENTINEL LYMPH NODE BIOPSY;  Surgeon: Roney Castrejon MD;  Location: [facility] OR    COLONOSCOPY  02/17/2004    wnl    COLONOSCOPY N/A 6/16/2016    Procedure: COLONOSCOPY;  Surgeon: Lloyd Gutierrez MD;  Location: [facility]    EXCISE LESION UPPER EXTREMITY Right 10/16/2023    Procedure: RIGHT ARM WIDE LOCAL  EXCISION;  Surgeon: Roney Castrejon MD;  Location: [facility] OR    EXCISE MASS UPPER EXTREMITY Left 10/16/2023    Procedure: LEFT ARM WIDE LOCAL EXCISION;  Surgeon: Roney Castrejon MD;  Location: [facility] OR    HYSTERECTOMY, VAGINAL  4/18/06    Laparoscopic supracerv hyst-BSO       Social History:  Social History     Socioeconomic History    Marital status:      Spouse name: Not on file    Number of children: Not on file    Years of education: Not on file    Highest education level: Not on file   Occupational History    Not on file   Tobacco Use    Smoking status: Never     Passive exposure: Past (father smoked a pipe  smoked in the garage)    Smokeless tobacco: Never   Vaping Use    Vaping status: Never Used   Substance and Sexual Activity    Alcohol use: Yes     Comment: rare    Drug use: No    Sexual activity: Yes     Partners: Male     Birth control/protection: Surgical     Comment: hyster.   Other Topics Concern    Parent/sibling w/ CABG, MI or angioplasty before 65F 55M? No   Social History Narrative    Not on file     Social Drivers of Health     Financial Resource Strain: Low Risk  (12/11/2024)    Financial Resource Strain     Within the past 12 months, have you or your family members you live with been unable to get utilities (heat, electricity) when it was really needed?: No   Food Insecurity: Low Risk  (12/11/2024)    Food Insecurity     Within the past 12 months, did you worry that your food would run out before you got money to buy more?: No     Within the past 12 months, did the food you bought just not last and you didn t have money to get more?: No   Transportation Needs: Low Risk  (12/11/2024)    Transportation Needs     Within the past 12 months, has lack of transportation kept you from medical appointments, getting your medicines, non-medical meetings or appointments, work, or from getting things that you need?: No   Physical Activity: Insufficiently Active (12/11/2024)    Exercise Vital Sign      Days of Exercise per Week: 1 day     Minutes of Exercise per Session: 20 min   Stress: Stress Concern Present (12/11/2024)    [employer] [place] [employer] - Occupational Stress Questionnaire     Feeling of Stress : To some extent   Social Connections: Unknown (12/11/2024)    Social Connection and Isolation Panel [NHANES]     Frequency of Communication with Friends and Family: Not on file     Frequency of Social Gatherings with Friends and Family: Once a week     Attends Anabaptism Services: Not on file     Active Member of Clubs or Organizations: Not on file     Attends Club or Organization Meetings: Not on file     Marital Status: Not on file   Interpersonal Safety: Low Risk  (12/16/2024)    Interpersonal Safety     Do you feel physically and emotionally safe where you currently live?: Yes     Within the past 12 months, have you been hit, slapped, kicked or otherwise physically hurt by someone?: No     Within the past 12 months, have you been humiliated or emotionally abused in other ways by your partner or ex-partner?: No   Housing Stability: Low Risk  (12/11/2024)    Housing Stability     Do you have housing? : Yes     Are you worried about losing your housing?: No       Family History:  Family History   Problem Relation Age of Onset    Hypertension Mother     Cancer Mother         pancreatic CA - passed at age 66    Lipids Mother     Cancer Father         liver CA    Depression Father     Depression Sister     Skin Cancer Sister     Arthritis Sister         left knee replacement    Skin Cancer Sister         probably    Breast Cancer Sister     Depression Sister     Depression Sister     Breast Cancer Sister        Review of Systems:  In the last TWO WEEKS have you experienced any of the following symptoms?  Fevers: (Patient-Rptd) No  Night Sweats: (Patient-Rptd) No  Weight Gain: (Patient-Rptd) No  Pain at Night: (Patient-Rptd) No  Double Vision: (Patient-Rptd) No  Changes in Vision:  "(Patient-Rptd) No  Difficulty Breathing through Nose: (Patient-Rptd) No  Sore Throat in Morning: (Patient-Rptd) No  Dry Mouth in the Morning: (Patient-Rptd) No  Shortness of Breath Lying Flat: (Patient-Rptd) No  Shortness of Breath With Activity: (Patient-Rptd) No  Awakening with Shortness of Breath: (Patient-Rptd) No  Increased Cough: (Patient-Rptd) No  Heart Racing at Night: (Patient-Rptd) No  Swelling in Feet or Legs: (Patient-Rptd) No  Diarrhea at Night: (Patient-Rptd) No  Heartburn at Night: (Patient-Rptd) No  Urinating More than Once at Night: (Patient-Rptd) Yes  Losing Control of Urine at Night: (Patient-Rptd) No  Joint Pains at Night: (Patient-Rptd) Yes  Headaches in Morning: (Patient-Rptd) No  Weakness in Arms or Legs: (Patient-Rptd) No  Depressed Mood: (Patient-Rptd) No  Anxiety: (Patient-Rptd) No     Physical Examination:  Vitals: BP (!) 162/86   Pulse 62   Ht 1.626 m (5' 4\")   Wt 86.2 kg (190 lb)   LMP 10/24/2005   BMI 32.61 kg/m     BMI= Body mass index is 32.61 kg/m .  General appearance: Awake, alert, cooperative. Well groomed. Sitting comfortably in chair. In no apparent distress.  HEENT: Head: Normocephalic, atraumatic. Eyes: PERRL. Conjunctiva clear. Sclera normal. Nose: External appearance normal.  Skin: No rashes or significant lesions.   Neurologic: Alert, oriented x3. No focal neurological deficit. Gait normal.   Psychiatric: Mood euthymic. Affect congruent with full range and intensity.           Data: All pertinent previous laboratory data reviewed     Recent Labs   Lab Test 04/08/25  1053 12/17/24  1458    143   POTASSIUM 4.7 5.0   CHLORIDE 104 104   CO2 25 29   ANIONGAP 15 10   GLC 96 85   BUN 23.8* 21.5   CR 0.82 0.99*   ALFREDA 9.8 10.0       Recent Labs   Lab Test 04/08/25  1053   WBC 4.3   RBC 3.82   HGB 11.9   HCT 35.3   MCV 92   MCH 31.2   MCHC 33.7   RDW 12.3          Recent Labs   Lab Test 12/16/24  1526   PROTTOTAL 8.1   ALBUMIN 4.8   BILITOTAL 0.3   ALKPHOS 83   AST " "42   ALT 48       TSH   Date Value   12/16/2024 0.82 uIU/mL   12/11/2023 0.57 uIU/mL   10/19/2022 0.84 mU/L   08/16/2021 0.79 mU/L   05/01/2014 0.60 mU/L   04/17/2013 1.31 mU/L       No results found for: \"UAMP\", \"UBARB\", \"BENZODIAZEUR\", \"UCANN\", \"UCOC\", \"OPIT\", \"UPCP\"    No results found for: \"IRONSAT\", \"YE50405\", \"NICOLE\"    No results found for: \"PH\", \"PHARTERIAL\", \"PO2\", \"KP3VAAVCEXZ\", \"SAT\", \"PCO2\", \"HCO3\", \"BASEEXCESS\", \"SHO\", \"BEB\"    @LABRCNTIPR(phv:4,pco2v:4,po2v:4,hco3v:4,david:4,o2per:4)@    Echocardiology: No results found for this or any previous visit (from the past 4320 hours).    Chest x-ray: No results found for this or any previous visit from the past 365 days.      Chest CT: No results found for this or any previous visit from the past 365 days.      PFT: Most Recent [facility] Pulmonary Function Testing    No results found for: \"20001\"  No results found for: \"20002\"  No results found for: \"20003\"  No results found for: \"20015\"  No results found for: \"20016\"  No results found for: \"20027\"  No results found for: \"20028\"  No results found for: \"20029\"  No results found for: \"20079\"  No results found for: \"20080\"  No results found for: \"20081\"  No results found for: \"20335\"  No results found for: \"20105\"  No results found for: \"20053\"  No results found for: \"20054\"  No results found for: \"20055\"      Shadia Bustillos PA-C 6/4/2025     [facility]  [address], [place] [address]     [facility]  [address] [address]  Chart documentation was completed, in part, with [employer] voice-recognition software. Even though reviewed, some grammatical, spelling, and word errors may remain.    54 minutes spent on day of encounter reviewing medical records, history and physical examination, review of previous testing and interpretation, documentation and further activities as noted above        "

## 2025-06-04 NOTE — PATIENT INSTRUCTIONS
"        MY TREATMENT INFORMATION FOR SLEEP APNEA-  Shanelle Sepulveda    Am I having a home sleep study?  --->Watch the video for the device you are using:    -/drop off device-   https://www.youSafeTacMag.com/watch?v=yGGFBdELGhk    Frequently asked questions:  1. What is Obstructive Sleep Apnea (JOSEMANUEL)? JOSEMANUEL is the most common type of sleep apnea. Apnea means, \"without breath.\"  Apnea is most often caused by narrowing or collapse of the upper airway as muscles relax during sleep.   Almost everyone has occasional apneas. Most people with sleep apnea have had brief interruptions at night frequently for many years.  The severity of sleep apnea is related to how frequent and severe the events are.   2. What are the consequences of JOSEMANUEL? Symptoms include: feeling sleepy during the day, snoring loudly, gasping or stopping of breathing, trouble sleeping, and occasionally morning headaches or heartburn at night.  Sleepiness can be serious and even increase the risk of falling asleep while driving. Other health consequences may include development of high blood pressure and other cardiovascular disease in persons who are susceptible. Untreated JOSEMANUEL  can contribute to heart disease, stroke and diabetes.   3. What are the treatment options? In most situations, sleep apnea is a lifelong disease that must be managed with daily therapy. Medications are not effective for sleep apnea and surgery is generally not considered until other therapies have been tried. Your treatment is your choice . Continuous Positive Airway (CPAP) works right away and is the therapy that is effective in nearly everyone. An oral device to hold your jaw forward is usually the next most reliable option. Other options include postioning devices (to keep you off your back), weight loss, and surgery including a tongue pacing device. There is more detail about some of these options below.  4. Are my sleep studies covered by insurance? Although we will request " verification of coverage, we advise you also check in advance of the study to ensure there is coverage.    Important tips for those choosing CPAP and similar devices  REMEMBER-IF YOU RECEIVE A CALL FROM  [phone]-->IT IS TO SETUP A DEVICE  For new devices, sign up for device STEF to monitor your device for your followup visits  We encourage you to utilize the [facility] stef or website ( https://Identify.[employer]/ ) to monitor your therapy progress and share the data with your healthcare team when you discuss your sleep apnea.                                                    Know your equipment:  CPAP is continuous positive airway pressure that prevents obstructive sleep apnea by keeping the throat from collapsing while you are sleeping. In most cases, the device is  smart  and can slowly self-adjusts if your throat collapses and keeps a record every day of how well you are treated-this information is available to you and your care team.  BPAP is bilevel positive airway pressure that keeps your throat open and also assists each breath with a pressure boost to maintain adequate breathing.  Special kinds of BPAP are used in patients who have inadequate breathing from lung or heart disease. In most cases, the device is  smart  and can slowly self-adjusts to assist breathing. Like CPAP, the device keeps a record of how well you are treated.  Your mask is your connection to the device. You get to choose what feels most comfortable and the staff will help to make sure if fits. Here: are some examples of the different masks that are available: Magnetic mask aids may assist with use but there are safety issues that should be addressed when considering with magnets* ( see end of discussion).       Key points to remember on your journey with sleep apnea:  Sleep study.  PAP devices often need to be adjusted during a sleep study to show that they are effective and adjusted right.  Good tips to remember: Try wearing just  the mask during a quiet time during the day so your body adapts to wearing it. A humidifier is recommended for comfort in most cases to prevent drying of your nose and throat. Allergy medication from your provider may help you if you are having nasal congestion.  Getting settled-in. It takes more than one night for most of us to get used to wearing a mask. Try wearing just the mask during a quiet time during the day so your body adapts to wearing it. A humidifier is recommended for comfort in most cases. Our team will work with you carefully on the first day and will be in contact within 4 days and again at 2 and 4 weeks for advice and remote device adjustments. Your therapy is evaluated by the device each day.   Use it every night. The more you are able to sleep naturally for 7-8 hours, the more likely you will have good sleep and to prevent health risks or symptoms from sleep apnea. Even if you use it 4 hours it helps. Occasionally all of us are unable to use a medical therapy, in sleep apnea, it is not dangerous to miss one night.   Communicate. Call our skilled team on the number provided on the first day if your visit for problems that make it difficult to wear the device. Over 2 out of 3 patients can learn to wear the device long-term with help from our team. Remember to call our team or your sleep providers if you are unable to wear the device as we may have other solutions for those who cannot adapt to mask CPAP therapy. It is recommended that you sleep your sleep provider within the first 3 months and yearly after that if you are not having problems.   Use it for your health. We encourage use of CPAP masks during daytime quiet periods to allow your face and brain to adapt to the sensation of CPAP so that it will be a more natural sensation to awaken to at night or during naps. This can be very useful during the first few weeks or months of adapting to CPAP though it does not help medically to wear CPAP  during wakefulness and  should not be used as a strategy just to meet guidelines.  Take care of your equipment. Make sure you clean your mask and tubing using directions every day and that your filter and mask are replaced as recommended or if they are not working.     *Masks with magnets:  Updated Contraindications  Masks with magnetic components are contraindicated for use by patients where they, or anyone in close physical contact while using the mask, have the following:   Active medical implants that interact with magnets (i.e., pacemakers, implantable cardioverter defibrillators (ICD), neurostimulators, cerebrospinal fluid (CSF) shunts, insulin/infusion pumps)   Metallic implants/objects containing ferromagnetic material (i.e., aneurysm clips/flow disruption devices, embolic coils, stents, valves, electrodes, implants to restore hearing or balance with implanted magnets, ocular implants, metallic splinters in the eye)  Updated Warning  Keep the mask magnets at a safe distance of at least 6 inches (150 mm) away from implants or medical devices that may be adversely affected by magnetic interference. This warning applies to you or anyone in close physical contact with your mask. The magnets are in the frame and lower headgear clips, with a magnetic field strength of up to 400mT. When worn, they connect to secure the mask but may inadvertently detach while asleep.  Implants/medical devices, including those listed within contraindications, may be adversely affected if they change function under external magnetic fields or contain ferromagnetic materials that attract/repel to magnetic fields (some metallic implants, e.g., contact lenses with metal, dental implants, metallic cranial plates, screws, sanjeev hole covers, and bone substitute devices). Consult your physician and  of your implant / other medical device for information on the potential adverse effects of magnetic fields.    BESIDES CPAP, WHAT  OTHER THERAPIES ARE THERE?    Positioning Device  Positioning devices are generally used when sleep apnea is mild and only occurs on your back.This example shows a pillow that straps around the waist. It may be appropriate for those whose sleep study shows milder sleep apnea that occurs primarily when lying flat on one's back. Preliminary studies have shown benefit but effectiveness at home may need to be verified by a home sleep test. These devices are generally not covered by medical insurance.  Examples of devices that maintain sleeping on the back to prevent snoring and mild sleep apnea.    Belt type body positioner  http://Cerevo.[employer]/    Electronic reminder  http://nightshifttherapy.com/            Oral Appliance  What is oral appliance therapy?  An oral appliance device fits on your teeth at night like a retainer used after having braces. The device is made by a specialized dentist and requires several visits over 1-2 months before a manufactured device is made to fit your teeth and is adjusted to prevent your sleep apnea. Once an oral device is working properly, snoring should be improved. A home sleep test may be recommended at that time if to determine whether the sleep apnea is adequately treated.       Some things to remember:  -Oral devices are often, but not always, covered by your medical insurance. Be sure to check with your insurance provider.   -If you are referred for oral therapy, you will be given a list of specialized dentists to consider or you may choose to visit the Web site of the American Academy of Dental Sleep Medicine  -Oral devices are less likely to work if you have severe sleep apnea or are extremely overweight.     More detailed information  An oral appliance is a small acrylic device that fits over the upper and lower teeth  (similar to a retainer or a mouth guard). This device slightly moves jaw forward, which moves the base of the tongue forward, opens the airway, improves  breathing for effective treat snoring and obstructive sleep apnea in perhaps 7 out of 10 people .  The best working devices are custom-made by a dental device  after a mold is made of the teeth 1, 2, 3.  When is an oral appliance indicated?  Oral appliance therapy is recommended as a first-line treatment for patients with primary snoring, mild sleep apnea, and for patients with moderate sleep apnea who prefer appliance therapy to use of CPAP4, 5. Severity of sleep apnea is determined by sleep testing and is based on the number of respiratory events per hour of sleep.   How successful is oral appliance therapy?  The success rate of oral appliance therapy in patients with mild sleep apnea is 75-80% while in patients with moderate sleep apnea it is 50-70%. The chance of success in patients with severe sleep apnea is 40-50%. The research also shows that oral appliances have a beneficial effect on the cardiovascular health of JOSEMANUEL patients at the same magnitude as CPAP therapy7.  Oral appliances should be a second-line treatment in cases of severe sleep apnea, but if not completely successful then a combination therapy utilizing CPAP plus oral appliance therapy may be effective. Oral appliances tend to be effective in a broad range of patients although studies show that the patients who have the highest success are females, younger patients, those with milder disease, and less severe obesity. 3, 6.   Finding a dentist that practices dental sleep medicine  Specific training is available through the American Academy of Dental Sleep Medicine for dentists interested in working in the field of sleep. To find a dentist who is educated in the field of sleep and the use of oral appliances, near you, visit the Web site of the American Academy of Dental Sleep Medicine.    References  1. Pallavi et al. Objectively measured vs self-reported compliance during oral appliance therapy for sleep-disordered breathing. Chest  2013; 144(5): 7349-8125.  2. Brad, et al. Objective measurement of compliance during oral appliance therapy for sleep-disordered breathing. Thorax 2013; 68(1): 91-96.  3. Yfn et al. Mandibular advancement devices in 620 men and women with JOSEMANUEL and snoring: tolerability and predictors of treatment success. Chest 2004; 125: 9981-6310.  4. Joselito, et al. Oral appliances for snoring and JOSEMANUEL: a review. Sleep 2006; 29: 244-262.  5. Moses et al. Oral appliance treatment for JOSEMANUEL: an update. J Clin Sleep Med 2014; 10(2): 215-227.  6. Meenakshi et al. Predictors of OSAH treatment outcome. J Dent Res 2007; 86: 0728-6705.      Weight Loss:   Your There is no height or weight on file to calculate BMI.    Being overweight does not necessarily mean you will have health consequences.  Those who have BMI over 30 or over 27 with existing medical conditions carries greater risk.   Weight loss decreases severity of sleep apnea in most people with obesity. For those with mild obesity who have developed snoring with weight gain, even 15-30 pound weight loss can improve and occasionally milder eliminate sleep apnea.  Structured and life-long dietary and health habits are necessary to lose weight and keep healthier weight levels.     The Comprehensive Weight loss program offers all aspects of weight loss strategies including two Non-Surgical Weight Loss Programs: Medical Weight Management and our 24 Week Healthy Lifestyle Program:  Medical Weight Management: You will meet with a Medical Weight Management Provider, as well as a Registered Dietician. The program may include medication therapy, dietary education, recommended exercise and physical therapy programs, monthly support group meetings, and possible psychological counseling. Follow up visits with the provider or dietician are scheduled based on your progress and needs.  24 Week Healthy Lifestyle Program: This unique program is designed to give you the support of  weekly appointments and activities thru a 24-week period. It may include all of the components of the basic program (above), with the addition of 11 individual Health  Visits, 24-week access to the [employer] website for over 700 online classes, and monthly support group meetings. This program has an out-of-pocket expense of $499 to cover the items that can not be billed to insurance (health coaches and [employer] access), and is non-refundable/non-transferable (you may be able to use a Health Savings Account; ask your HSA provider). There may be an optional meal replacement plan prescribed as well.   Medication therapy has been approved for the treatment of sleep apnea: The FDA approved tirzepatide (ZEPBOUND) for moderate to severe sleep apnea (apnea-hypopnea index greater than or equal to 15) in patients with BMI of greater than or equal to 30, or BMI greater than or equal to 27 with at least 1 weight-related condition such as hypertension or dyslipidemia.  Surgical management achieves meaningful long-term weight loss and improvement in health risks in most patients with more severe obesity.      Sleep Apnea Surgery:    Surgery for obstructive sleep apnea is considered generally only when other therapies fail to work. Surgery may be discussed with you if you are having a difficult time tolerating CPAP and or when there is an abnormal structure that requires surgical correction.  Nose and throat surgeries often enlarge the airway to prevent collapse.  Most of these surgeries create pain for 1-2 weeks and up to half of the most common surgeries are not effective throughout life.  You should carefully discuss the benefits and drawbacks to surgery with your sleep provider and surgeon to determine if it is the best solution for you.   More information  Surgery for JOSEMANUEL is directed at areas that are responsible for narrowing or complete obstruction of the airway during sleep.  There are a wide range of procedures  available to enlarge and/or stabilize the airway to prevent blockage of breathing in the three major areas where it can occur: the palate, tongue, and nasal regions.  Successful surgical treatment depends on the accurate identification of the factors responsible for obstructive sleep apnea in each person.  A personalized approach is required because there is no single treatment that works well for everyone.  Because of anatomic variation, consultation with an examination by a sleep surgeon is a critical first step in determining what surgical options are best for each patient.  In some cases, examination during sedation may be recommended in order to guide the selection of procedures.  Patients will be counseled about risks and benefits as well as the typical recovery course after surgery. Surgery is typically not a cure for a person s JOSEMANUEL.  However, surgery will often significantly improve one s JOSEMANUEL severity (termed  success rate ).  Even in the absence of a cure, surgery will decrease the cardiovascular risk associated with OSA7; improve overall quality of life8 (sleepiness, functionality, sleep quality, etc).      Palate Procedures:  Patients with JOSEMANUEL often have narrowing of their airway in the region of their tonsils and uvula.  The goals of palate procedures are to widen the airway in this region as well as to help the tissues resist collapse.  Modern palate procedure techniques focus on tissue conservation and soft tissue rearrangement, rather than tissue removal.  Often the uvula is preserved in this procedure. Residual sleep apnea is common in patient after pharyngoplasty with an average reduction in sleep apnea events of 33%2.      Tongue Procedures:  ExamWhile patients are awake, the muscles that surround the throat are active and keep this region open for breathing. These muscles relax during sleep, allowing the tongue and other structures to collapse and block breathing.  There are several different  tongue procedures available.  Selection of a tongue base procedure depends on characteristics seen on physical exam.  Generally, procedures are aimed at removing bulky tissues in this area or preventing the back of the tongue from falling back during sleep.  Success rates for tongue surgery range from 50-62%3.    Hypoglossal Nerve Stimulation:  Hypoglossal nerve stimulation has recently received approval from the [place] Food and Drug Administration for the treatment of obstructive sleep apnea.  This is based on research showing that the system was safe and effective in treating sleep apnea6.  Results showed that the median AHI score decreased 68%, from 29.3 to 9.0. This therapy uses an implant system that senses breathing patterns and delivers mild stimulation to airway muscles, which keeps the airway open during sleep.  The system consists of three fully implanted components: a small generator (similar in size to a pacemaker), a breathing sensor, and a stimulation lead.  Using a small handheld remote, a patient turns the therapy on before bed and off upon awakening.    Candidates for this device must be greater than 18 years of age, have moderate to severe obstructive sleep apnea with less than 25% central events  (AHI between 15-65), BMI less than 35, have tried CPAP/oral appliance for at least 8 weeks without success, and have appropriate upper airway anatomy (determined by a sleep endoscopy performed by Dr. Ceho Matos or Dr. Ok Preston).     Nasal Procedures:  Nasal obstruction can interfere with nasal breathing during the day and night.  Studies have shown that relief of nasal obstruction can improve the ability of some patients to tolerate positive airway pressure therapy for obstructive sleep apnea1.  Treatment options include medications such as nasal saline, topical corticosteroid and antihistamine sprays, and oral medications such as antihistamines or decongestants. Non-surgical treatments can  include external nasal dilators for selected patients. If these are not successful by themselves, surgery can improve the nasal airway either alone or in combination with these other options.        Combination Procedures:  Combination of surgical procedures and other treatments may be recommended, particularly if patients have more than one area of narrowing or persistent positional disease.  The success rate of combination surgery ranges from 66-80%2,3.    References  Hannah MANDUJANO. The Role of the Nose in Snoring and Obstructive Sleep Apnoea: An Update.  Eur Arch Otorhinolaryngol. 2011; 268: 1365-73.   Marcella SM; Aflie JA; Rebecca JR; Pallanch JF; Sharri MB; Arnie SG; Luba PRABHAKAR. Surgical modifications of the upper airway for obstructive sleep apnea in adults: a systematic review and meta-analysis. SLEEP 2010;33(10):8404-9175. Fernando DICKSON. Hypopharyngeal surgery in obstructive sleep apnea: an evidence-based medicine review.  Arch Otolaryngol Head Neck Surg. 2006 Feb;132(2):206-13.  Nishant YH1, Elías Y, Cristopher KRYSTLE. The efficacy of anatomically based multilevel surgery for obstructive sleep apnea. Otolaryngol Head Neck Surg. 2003 Oct;129(4):327-35.  Fernando DICKSON, Goldberg A. Hypopharyngeal Surgery in Obstructive Sleep Apnea: An Evidence-Based Medicine Review. Arch Otolaryngol Head Neck Surg. 2006 Feb;132(2):206-13.  Strollo PJ et al. Upper-Airway Stimulation for Obstructive Sleep Apnea.  N Engl J Med. 2014 Jan 9;370(2):139-49.  Fredy Y et al. Increased Incidence of Cardiovascular Disease in Middle-aged Men with Obstructive Sleep Apnea. Am J Respir Crit Care Med; 2002 166: 159-165  Jorge EM et al. Studying Life Effects and Effectiveness of Palatopharyngoplasty (SLEEP) study: Subjective Outcomes of Isolated Uvulopalatopharyngoplasty. Otolaryngol Head Neck Surg. 2011; 144: 623-631.        WHAT IF I ONLY HAVE SNORING?    Mandibular advancement devices, lateral sleep positioning, long-term weight loss and treatment of nasal  allergies have been shown to improve snoring.  Exercising tongue muscles with a game (https://apps.[employer]a.[employer]/us/stef/soundly-reduce-snoring/cx6776552904) or stimulating the tongue during the day with a device (https://doi.org/10.3390/ofx35697517) have improved snoring in some individuals.  https://www.[employer]/  https://www.sleepfoundation.org/best-anti-snoring-mouthpieces-and-mouthguards    Remember to Drive Safe... Drive Alive     Sleep health profoundly affects your health, mood, and your safety.  Thirty three percent of the population (one in three of us) is not getting enough sleep and many have a sleep disorder. Not getting enough sleep or having an untreated / undertreated sleep condition may make us sleepy without even knowing it. In fact, our driving could be dramatically impaired due to our sleep health. As your provider, here are some things I would like you to know about driving:     Here are some warning signs for impairment and dangerous drowsy driving:              -Having been awake more than 16 hours               -Looking tired               -Eyelid drooping              -Head nodding (it could be too late at this point)              -Driving for more than 30 minutes     Some things you could do to make the driving safer if you are experiencing some drowsiness:              -Stop driving and rest              -Call for transportation              -Make sure your sleep disorder is adequately treated     Some things that have been shown NOT to work when experiencing drowsiness while driving:              -Turning on the radio              -Opening windows              -Eating any  distracting  /  entertaining  foods (e.g., sunflower seeds, candy, or any other)              -Talking on the phone      Your decision may not only impact your life, but also the life of others. Please, remember to drive safe for yourself and all of us.

## 2025-08-05 ENCOUNTER — OFFICE VISIT (OUTPATIENT)
Dept: DERMATOLOGY | Facility: CLINIC | Age: 75
End: 2025-08-05
Payer: COMMERCIAL

## 2025-08-05 DIAGNOSIS — L81.4 SOLAR LENTIGO: ICD-10-CM

## 2025-08-05 DIAGNOSIS — D18.01 CHERRY ANGIOMA: ICD-10-CM

## 2025-08-05 DIAGNOSIS — D22.9 MULTIPLE BENIGN NEVI: ICD-10-CM

## 2025-08-05 DIAGNOSIS — Z86.018 HISTORY OF DYSPLASTIC NEVUS: ICD-10-CM

## 2025-08-05 DIAGNOSIS — Z12.83 SKIN CANCER SCREENING: ICD-10-CM

## 2025-08-05 DIAGNOSIS — L82.1 SEBORRHEIC KERATOSES: ICD-10-CM

## 2025-08-05 DIAGNOSIS — Z85.820 HISTORY OF MELANOMA: Primary | ICD-10-CM

## (undated) DEVICE — MARKER MARGIN MARKER STD 6 COLOR SGL USE MMS6

## (undated) DEVICE — DRSG PRIMAPORE 02X3" 7133

## (undated) DEVICE — CLIP HORIZON MED BLUE 002200

## (undated) DEVICE — ESU ELEC BLADE 2.75" COATED/INSULATED E1455

## (undated) DEVICE — SUCTION MANIFOLD NEPTUNE 2 SYS 1 PORT 702-025-000

## (undated) DEVICE — SU VICRYL 3-0 SH 27" UND J416H

## (undated) DEVICE — PAD CHUX UNDERPAD 30X36" P3036C

## (undated) DEVICE — DRSG PRIMAPORE 03 1/8X6" 66000318

## (undated) DEVICE — SU DERMABOND ADVANCED .7ML DNX12

## (undated) DEVICE — BNDG COHESIVE 2"X5YDS UNSTERILE ASSORTED COLORS LF 8962

## (undated) DEVICE — PREP PAD ALCOHOL 6818

## (undated) DEVICE — SYR 10ML FINGER CONTROL W/O NDL 309695

## (undated) DEVICE — SU PDS II 4-0 FS-2 27" Z422H

## (undated) DEVICE — SU SILK 3-0 SH 30" K832H

## (undated) DEVICE — DRAPE EXTREMITY W/ARMBOARD 29405

## (undated) DEVICE — SPECIMEN CONTAINER W/10% BUFFERED FORMALIN 120ML 591201

## (undated) DEVICE — SU ETHILON 3-0 PS-1 18" 1663H

## (undated) DEVICE — PACK MINOR CUSTOM ASC

## (undated) DEVICE — GLOVE PROTEXIS POWDER FREE SMT 8.0  2D72PT80X

## (undated) DEVICE — CLIP HORIZON SM RED WIDE SLOT 001201

## (undated) DEVICE — SOL WATER IRRIG 500ML BOTTLE 2F7113

## (undated) DEVICE — DRAPE LAP TRANSVERSE 29421

## (undated) DEVICE — NDL BLUNT 18GA 1" W/O FILTER 305181

## (undated) DEVICE — PREP CHLORAPREP 26ML TINTED ORANGE  260815

## (undated) DEVICE — DRAPE IOBAN INCISE 23X17" 6650EZ

## (undated) DEVICE — NDL 25GA 2"  8881200441

## (undated) DEVICE — SOL NACL 0.9% IRRIG 500ML BOTTLE 2F7123

## (undated) DEVICE — LINEN TOWEL PACK X5 5464

## (undated) DEVICE — SU DERMABOND DHV12

## (undated) DEVICE — ESU GROUND PAD ADULT W/CORD E7507

## (undated) RX ORDER — ACETAMINOPHEN 325 MG/1
TABLET ORAL
Status: DISPENSED
Start: 2023-10-16

## (undated) RX ORDER — ISOSULFAN BLUE 50 MG/5ML
INJECTION, SOLUTION SUBCUTANEOUS
Status: DISPENSED
Start: 2023-10-16

## (undated) RX ORDER — CEFAZOLIN SODIUM 2 G/50ML
SOLUTION INTRAVENOUS
Status: DISPENSED
Start: 2023-10-16

## (undated) RX ORDER — LABETALOL HYDROCHLORIDE 5 MG/ML
INJECTION, SOLUTION INTRAVENOUS
Status: DISPENSED
Start: 2023-10-16

## (undated) RX ORDER — PROPOFOL 10 MG/ML
INJECTION, EMULSION INTRAVENOUS
Status: DISPENSED
Start: 2023-10-16

## (undated) RX ORDER — FENTANYL CITRATE 50 UG/ML
INJECTION, SOLUTION INTRAMUSCULAR; INTRAVENOUS
Status: DISPENSED
Start: 2023-10-16